# Patient Record
Sex: FEMALE | Race: BLACK OR AFRICAN AMERICAN | NOT HISPANIC OR LATINO | Employment: FULL TIME | URBAN - METROPOLITAN AREA
[De-identification: names, ages, dates, MRNs, and addresses within clinical notes are randomized per-mention and may not be internally consistent; named-entity substitution may affect disease eponyms.]

---

## 2018-05-27 ENCOUNTER — HOSPITAL ENCOUNTER (EMERGENCY)
Facility: HOSPITAL | Age: 48
Discharge: HOME | End: 2018-05-27
Attending: EMERGENCY MEDICINE
Payer: COMMERCIAL

## 2018-05-27 VITALS
TEMPERATURE: 98 F | OXYGEN SATURATION: 97 % | SYSTOLIC BLOOD PRESSURE: 139 MMHG | HEART RATE: 80 BPM | HEIGHT: 64 IN | BODY MASS INDEX: 34.15 KG/M2 | RESPIRATION RATE: 18 BRPM | WEIGHT: 200 LBS | DIASTOLIC BLOOD PRESSURE: 85 MMHG

## 2018-05-27 DIAGNOSIS — N39.0 URINARY TRACT INFECTION WITHOUT HEMATURIA, SITE UNSPECIFIED: Primary | ICD-10-CM

## 2018-05-27 LAB
B-HCG UR QL: NEGATIVE
BACTERIA #/AREA URNS HPF: ABNORMAL /HPF
BILIRUB UR QL STRIP: NEGATIVE
CLARITY UR: CLEAR
COLOR UR: YELLOW
CTP QC/QA: YES
GLUCOSE UR QL STRIP: NEGATIVE
HGB UR QL STRIP: NEGATIVE
KETONES UR QL STRIP: NEGATIVE
LEUKOCYTE ESTERASE UR QL STRIP: ABNORMAL
MICROSCOPIC COMMENT: ABNORMAL
NITRITE UR QL STRIP: NEGATIVE
NON-SQ EPI CELLS #/AREA URNS HPF: 3 /HPF
PH UR STRIP: 7 [PH] (ref 5–8)
PROT UR QL STRIP: NEGATIVE
RBC #/AREA URNS HPF: 1 /HPF (ref 0–4)
SP GR UR STRIP: 1.02 (ref 1–1.03)
SQUAMOUS #/AREA URNS HPF: 2 /HPF
URN SPEC COLLECT METH UR: ABNORMAL
UROBILINOGEN UR STRIP-ACNC: NEGATIVE EU/DL
WBC #/AREA URNS HPF: >100 /HPF (ref 0–5)
YEAST URNS QL MICRO: ABNORMAL

## 2018-05-27 PROCEDURE — 81000 URINALYSIS NONAUTO W/SCOPE: CPT

## 2018-05-27 PROCEDURE — 25000003 PHARM REV CODE 250: Performed by: PHYSICIAN ASSISTANT

## 2018-05-27 PROCEDURE — 87077 CULTURE AEROBIC IDENTIFY: CPT

## 2018-05-27 PROCEDURE — 87088 URINE BACTERIA CULTURE: CPT

## 2018-05-27 PROCEDURE — 87086 URINE CULTURE/COLONY COUNT: CPT

## 2018-05-27 PROCEDURE — 99284 EMERGENCY DEPT VISIT MOD MDM: CPT | Mod: 25

## 2018-05-27 PROCEDURE — 87186 SC STD MICRODIL/AGAR DIL: CPT

## 2018-05-27 PROCEDURE — 81025 URINE PREGNANCY TEST: CPT | Performed by: NURSE PRACTITIONER

## 2018-05-27 RX ORDER — PHENAZOPYRIDINE HYDROCHLORIDE 200 MG/1
200 TABLET, FILM COATED ORAL
Qty: 6 TABLET | Refills: 0 | Status: SHIPPED | OUTPATIENT
Start: 2018-05-27 | End: 2018-05-29

## 2018-05-27 RX ORDER — ETODOLAC 200 MG/1
200 CAPSULE ORAL 3 TIMES DAILY PRN
Qty: 20 CAPSULE | Refills: 0 | Status: SHIPPED | OUTPATIENT
Start: 2018-05-27 | End: 2018-06-03

## 2018-05-27 RX ORDER — CEPHALEXIN 500 MG/1
500 CAPSULE ORAL EVERY 12 HOURS
Qty: 20 CAPSULE | Refills: 0 | Status: SHIPPED | OUTPATIENT
Start: 2018-05-27 | End: 2018-06-06

## 2018-05-27 RX ORDER — CEPHALEXIN 250 MG/1
500 CAPSULE ORAL
Status: COMPLETED | OUTPATIENT
Start: 2018-05-27 | End: 2018-05-27

## 2018-05-27 RX ADMIN — CEPHALEXIN 500 MG: 250 CAPSULE ORAL at 11:05

## 2018-05-28 NOTE — ED TRIAGE NOTES
48 y.o female presents to the ED via personal transport. She reports left flank pain w/ urinary frequency. She also reports discomfort in her left ear.

## 2018-05-28 NOTE — DISCHARGE INSTRUCTIONS
Take full course of Keflex as prescribed for urinary tract infection. Take Azo to help with discomfort with urination.    Take Lodine as needed for pain.    Follow up with primary care in 2 days.     Return to ER for worsening symptoms, inability to tolerate by mouth or as needed.

## 2018-05-28 NOTE — ED PROVIDER NOTES
Encounter Date: 5/27/2018  This is a 48-year-old female presents with flank pain is states I think I may have a urinary tract infection patient.  She also complains of ear pain. Patient appears well in triage.  SCRIBE #1 NOTE: I, Tracy Scott, am scribing for, and in the presence of,  Alba Rojas PA-C. I have scribed the following portions of the note - Other sections scribed: ROS and HPI.       History     Chief Complaint   Patient presents with    Otalgia     Pt c/o left side ear pain x 2 days.  Denies taking pain medication.     Flank Pain     Pt c/o left side flank pain x 4 days.  Hurts to pee and frequency.      CC: Urine Urgency; Flank Pain; Otalgia    HPI: This 48 y.o. Female presents to the ED complaining of L sided flank pain, urine urgency, frequency and dysuria for last 4 days. She states she recently had a new boyfriend after a year long celibacy. Denies vaginal discharge, vaginal bleeding or any other sx.  She was treated for UTI a month ago.  She is on birth control pills.  She also reports left ear pain. No prior medical intervention for her sx.      The history is provided by the patient. No  was used.     Review of patient's allergies indicates:  No Known Allergies  Past Medical History:   Diagnosis Date    Hypertension      History reviewed. No pertinent surgical history.  History reviewed. No pertinent family history.  Social History   Substance Use Topics    Smoking status: Never Smoker    Smokeless tobacco: Not on file    Alcohol use No     Review of Systems   Constitutional: Negative for chills and fever.   HENT: Positive for ear pain (L).    Eyes: Negative for redness.   Gastrointestinal: Negative for nausea and vomiting.   Genitourinary: Positive for dysuria, flank pain, frequency and urgency. Negative for hematuria and vaginal discharge.       Physical Exam     Initial Vitals [05/27/18 2130]   BP Pulse Resp Temp SpO2   139/85 90 16 98.7 °F (37.1 °C) 98 %       MAP       103         Physical Exam    Nursing note and vitals reviewed.  Constitutional: She appears well-developed and well-nourished.   HENT:   Head: Normocephalic.   Right Ear: Hearing, tympanic membrane, external ear and ear canal normal.   Left Ear: Hearing, tympanic membrane, external ear and ear canal normal.   Nose: Nose normal.   Mouth/Throat: Oropharynx is clear and moist.   Eyes: Conjunctivae are normal.   Cardiovascular: Normal rate and regular rhythm. Exam reveals no gallop and no friction rub.    No murmur heard.  Pulmonary/Chest: Breath sounds normal. She has no wheezes. She has no rhonchi. She has no rales.   Abdominal: Soft. Bowel sounds are normal. She exhibits no distension. There is no tenderness. There is no rebound, no guarding, no tenderness at McBurney's point and negative Pace's sign.   Musculoskeletal: Normal range of motion.   Lymphadenopathy:     She has no cervical adenopathy.   Neurological: She is alert. She has normal strength. No cranial nerve deficit or sensory deficit.   Skin: Skin is warm and dry.   Psychiatric: She has a normal mood and affect.         ED Course   Procedures  Labs Reviewed   URINALYSIS - Abnormal; Notable for the following:        Result Value    Leukocytes, UA 3+ (*)     All other components within normal limits   URINALYSIS MICROSCOPIC - Abnormal; Notable for the following:     WBC, UA >100 (*)     Bacteria, UA Moderate (*)     Yeast, UA Rare (*)     Non-Squam Epith 3 (*)     All other components within normal limits   CULTURE, URINE   POCT URINE PREGNANCY             Medical Decision Making:   Initial Assessment:   Pt is a 47 y/o female with history of  no pertinent past medical history who presents for evaluation of 4 day history of L flank pain intermittent. Pt reports she has had associated dysuria, urinary urgency and frequency.  Patient is afebrile, pain or distress. Abdomen soft and nontender.  UPT is negative.  UA remarkable for infection.   Urine culture sent.  Keflex 1st dose given in the ED and at discharge as well as azo.   Patient also complaining of left otalgia x2 days with fullness sensation.  No evidence of AOM, OE or mastoiditis.  PCP follow-up in 2 days.  ER return precautions discussed including worsening symptoms, inability to tolerate p.o. or as needed. Discussed pt with Dr. quezada who agrees with assessment and paln.             Scribe Attestation:   Scribe #1: I performed the above scribed service and the documentation accurately describes the services I performed. I attest to the accuracy of the note.    Attending Attestation:   Physician Attestation Statement for Resident:  As the supervising MD . -: PATIENT HAS UTI.  NO DISCHARGE OR BLEEDING.  NOT PREGNANT.  REFUSING PELVIC EXAM.  WILL FOLLOW UP OUTPATIENT.  NO TENDERNESS ON EXAM.  NO CVA TENDERNESS. NO PELVIC TENDERNESS. WILL DC W FU         Physician Attestation for Scribe:  Physician Attestation Statement for Scribe #1: I, Alba Rojas PA-C, reviewed documentation, as scribed by Tracy Scott in my presence, and it is both accurate and complete.                    Clinical Impression:   The encounter diagnosis was Urinary tract infection without hematuria, site unspecified.                           Alba Rojas PA-C  05/28/18 0009

## 2018-05-30 LAB — BACTERIA UR CULT: NORMAL

## 2018-08-01 ENCOUNTER — TELEPHONE (OUTPATIENT)
Dept: BARIATRICS | Facility: CLINIC | Age: 48
End: 2018-08-01

## 2018-08-01 NOTE — TELEPHONE ENCOUNTER
----- Message from Sherri Bhagat sent at 8/1/2018 10:14 AM CDT -----  Contact: Self  Pt is calling because she is interested in being seen by Dr. Sneed for a weight program, to include diet pills, in lieu of a bariatric regiment.    She can be reached at [phone].    Thank you.

## 2018-08-02 ENCOUNTER — INITIAL CONSULT (OUTPATIENT)
Dept: BARIATRICS | Facility: CLINIC | Age: 48
End: 2018-08-02
Payer: COMMERCIAL

## 2018-08-02 VITALS
BODY MASS INDEX: 37.98 KG/M2 | HEART RATE: 84 BPM | HEIGHT: 64 IN | SYSTOLIC BLOOD PRESSURE: 128 MMHG | WEIGHT: 222.44 LBS | DIASTOLIC BLOOD PRESSURE: 80 MMHG

## 2018-08-02 DIAGNOSIS — I10 ESSENTIAL HYPERTENSION: Primary | ICD-10-CM

## 2018-08-02 PROBLEM — E66.813 OBESITY, CLASS III, BMI 40-49.9 (MORBID OBESITY): Status: ACTIVE | Noted: 2018-08-02

## 2018-08-02 PROBLEM — E66.01 OBESITY, CLASS III, BMI 40-49.9 (MORBID OBESITY): Status: ACTIVE | Noted: 2018-08-02

## 2018-08-02 PROCEDURE — 3008F BODY MASS INDEX DOCD: CPT | Mod: CPTII,S$GLB,, | Performed by: INTERNAL MEDICINE

## 2018-08-02 PROCEDURE — 99204 OFFICE O/P NEW MOD 45 MIN: CPT | Mod: S$GLB,,, | Performed by: INTERNAL MEDICINE

## 2018-08-02 PROCEDURE — 99999 PR PBB SHADOW E&M-EST. PATIENT-LVL III: CPT | Mod: PBBFAC,,, | Performed by: INTERNAL MEDICINE

## 2018-08-02 RX ORDER — OLMESARTAN MEDOXOMIL AND HYDROCHLOROTHIAZIDE 20/12.5 20; 12.5 MG/1; MG/1
1 TABLET ORAL DAILY
COMMUNITY
End: 2021-01-14

## 2018-08-02 NOTE — PROGRESS NOTES
Subjective:       Patient ID: Lizabeth Gomez is a 48 y.o. female.    Chief Complaint: Consult    CC: Weight    Pt seen today with daughter present.     Current attempts at weight loss: New pt to me, referred by Buffy Montalvo  No address on file , with Patient Active Problem List:     Essential hypertension- does not always take meds     Obesity, Class II, BMI 35-39.9, with comorbidity    Has tried to do better for 1 day with walking and eating a salad         Previous diet attempts: Denies    History of medication for loss: Phentermine last filled 1/16/18. States she developed a tolerance.   checked today  Did take trulicity from a friend for 3 months and says she lost some weight.     Heaviest weight: 285#  Lightest weight: 165#    Goal weight: 165#      Last eye exam:   Recent. No glaucoma per pt.     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 Pt does cook.   Breakfast: sausage biscuit. Weekends: pancakes, grits,     Lunch: hamburgers and fries. Fast food. Weekends: spaghetti and meatballs, baked chicken with veg.     Dinner: pork chops with rice and gravy and veg.  spaghetti and meatballs, baked chicken with veg.  Fried chicken. Chinese food- rice, ribs, egg rolls. Weekends: same    Snacks: ice cream around menses. Chips. Cookies.     Beverages: soda- 10 per week. Water. ETOH: 2 drinks a month.     Willingness to change: 8/10    EKG:    BMR: 1630      Review of Systems   Constitutional: Negative for chills and fever.   Respiratory: Negative for shortness of breath.         Mild Snoring   Cardiovascular: Positive for leg swelling. Negative for chest pain.   Gastrointestinal: Negative for constipation and diarrhea.        Denies GERD   Genitourinary: Negative for difficulty urinating and dysuria.   Musculoskeletal: Positive for back pain. Negative for arthralgias.   Neurological: Negative for dizziness and light-headedness.   Psychiatric/Behavioral: Negative  "for dysphoric mood. The patient is nervous/anxious.        Objective:     /80   Pulse 84   Ht 5' 4" (1.626 m)   Wt 100.9 kg (222 lb 7.1 oz)   LMP 07/29/2018   BMI 38.18 kg/m²     Physical Exam   Constitutional: She is oriented to person, place, and time. She appears well-developed. No distress.   Obese     HENT:   Head: Normocephalic and atraumatic.   Eyes: EOM are normal. Pupils are equal, round, and reactive to light. No scleral icterus.   Neck: Normal range of motion. Neck supple. No thyromegaly present.   Cardiovascular: Normal rate and normal heart sounds.  Exam reveals no gallop and no friction rub.    No murmur heard.  Pulmonary/Chest: Effort normal and breath sounds normal. No respiratory distress. She has no wheezes.   Abdominal: Soft. Bowel sounds are normal. She exhibits no distension. There is no tenderness.   Musculoskeletal: Normal range of motion. She exhibits no edema.   Neurological: She is alert and oriented to person, place, and time. No cranial nerve deficit.   Skin: Skin is warm and dry. No erythema.   Psychiatric: She has a normal mood and affect. Her behavior is normal. Judgment normal.   Vitals reviewed.      Assessment:       1. Essential hypertension    2. Obesity, Class II, BMI 35-39.9, with comorbidity        Plan:         1. Essential hypertension  The current medical regimen is effective;  continue present plan and medications. Expect improvement with weight loss.     2. Obesity, Class II, BMI 35-39.9, with comorbidity  Start Trulicity once a week. [email or web address].[email or web address] for coupon.     Decrease portions as soon as you start Trulicity. Some nausea in the first 2 weeks is not unusual.     If you get pain across the upper abdomen and around to your back, please call the office.     Exercise 30 min 3 times a week. Gradually increase.     Patient counseled in strategies for long term weight loss and maintenance: Keeping a food diary, exercise for 1 hour a day and eating breakfast " everyday.     3 meals a day made up of the following:  Unlimited green vegetables, tomatoes, mushrooms, spaghetti squash, cauliflower, meat, poultry, seafood, eggs and hard cheeses.   Milk and plain yogurt  Dressings, seasonings, condiments, etc should have less than 2 g sugars.   Beans (1-1.5 cups) or nuts (1/4 cup) can have 1 x a day.   1-2 servings of citrus fruits, berries, pineapple or melon a day (1/2 cup)  Avoid fried foods    No grains, rice, pasta, potatoes, bread, corn, peas, oatmeal, grits, tortillas, crackers, chips    No soda, sweet tea, juices or lemonade. All drinks should be 5 calories or less.     Www.dietdoctor.com for recipes. Moderate carb intake

## 2018-08-02 NOTE — PATIENT INSTRUCTIONS
Start Trulicity once a week. [email or web address].trulicity.com for coupon.     Decrease portions as soon as you start Trulicity. Some nausea in the first 2 weeks is not unusual.     If you get pain across the upper abdomen and around to your back, please call the office.     Exercise 30 min 3 times a week. Gradually increase.     Patient counseled in strategies for long term weight loss and maintenance: Keeping a food diary, exercise for 1 hour a day and eating breakfast everyday.     3 meals a day made up of the following:  Unlimited green vegetables, tomatoes, mushrooms, spaghetti squash, cauliflower, meat, poultry, seafood, eggs and hard cheeses.   Milk and plain yogurt  Dressings, seasonings, condiments, etc should have less than 2 g sugars.   Beans (1-1.5 cups) or nuts (1/4 cup) can have 1 x a day.   1-2 servings of citrus fruits, berries, pineapple or melon a day (1/2 cup)  Avoid fried foods    No grains, rice, pasta, potatoes, bread, corn, peas, oatmeal, grits, tortillas, crackers, chips    No soda, sweet tea, juices or lemonade. All drinks should be 5 calories or less.     [email or web address] for recipes. Moderate carb intake    *You can substitute regular dairy and/or dressings, and whole eggs for egg whites in the ideas below.     Meal Ideas for Regular Bariatric Diet  *Recipes and products available at www.bariatriceating.com      Breakfast: (15-20g protein)    - Egg white omelet: 2 egg whites or ½ cup Egg Beaters. (Optional proteins: cheese, shrimp, black beans, chicken, sliced turkey) (Optional veggies: tomatoes, salsa, spinach, mushrooms, onions, green peppers, or small slice avocado)     - Egg and sausage: 1 egg or ¼ cup Egg Beaters (any variety), with 1 travis or 2 links of Turkey sausage or Veggie breakfast sausage (Vehcon or Verid)    - Crust-less breakfast quiche: To make a glass pie dish, mix 4oz part skim Ricotta, 1 cup skim milk, and 2 eggs as your base. Add protein: shredded cheese, sliced lean ham or  turkey, turkey mc/sausage. Add veggies: tomato, onion, green onion, mushroom, green pepper, spinach, etc.    - Yogurt parfait: Mix 1 - 6oz container Dannon Light N Fit vanilla yogurt, with ¼ cup Kashi Go Lean cereal    - Cottage cheese and fruit: ½ cup part-skim cottage cheese or ricotta cheese topped with fresh fruit or sugar free preserves     - Paris De Guzman's Vanilla Egg custard* (add 2 Tbsp instant coffee granules to make Cappuccino Custard*)    - Hi-Protein café latte (skim milk, decaf coffee, 1 scoop protein powder). Optional to add Sugar free syrup or extract flavoring.    Lunch: (20-30g protein)    - ½ cup Black bean soup (Homemade or Progresso), with ¼ cup shredded low-fat cheese. Top with chopped tomato or fresh salsa.     - Lean deli turkey breast and low-fat sliced cheese, mustard or light bennett to moisten, rolled up together, or wrapped in a Rodri lettuce leaf    - Chicken salad made from dinner leftovers, moisten with low-fat salad dressing or light bennett. Also try leftover salmon, shrimp, tuna or boiled eggs. Serve ½ cup over dark green salad    - Fat-free canned refried beans, topped with ¼ cup shredded low-fat cheese. Top with chopped tomato or fresh salsa.     - Greek salad: Top mixed greens with 1-2oz grilled chicken, tomatoes, red onions, 2-3 kalamata olives, and sprinkle lightly with feta cheese. Spritz with Balsamic vinegar to taste.     - Crust-less lunch quiche: To make a glass pie dish, mix 4oz part skim Ricotta, 1 cup skim milk, and 2 eggs as your base. Add protein: shredded cheese, sliced lean ham or turkey, shrimp, chicken. Add veggies: tomato, onion, green onion, mushroom, green pepper, spinach, artichoke, broccoli, etc.    - Pizza bake: tomato sauce, low-fat shredded mozzarella and turkey pepperoni or Maltese mc. Add any veggies.    - Cucumber crab bites: Spread ¼ cup crab dip (lump crabmeat + light cream cheese and green onions) over sliced cucumber.     - Chicken with light  spinach and artichoke dip*: Puree in : 6oz cooked and drained spinach, 2 cloves garlic, 1 can cannelloni beans, ½ cup chopped green onions, 1 can drained artichoke hearts (not marinated in oil), lemon juice and basil. Mix in 2oz chopped up chicken.    Supper: (20-30g protein)    - Serve grilled fish over dark green salad tossed with low-fat dressing, served with grilled asparagus temple     - Rotisserie chicken salad: served with sliced strawberries, walnuts, fat-free feta cheese crumbles and 1 tbsp Smiths Own Light Raspberry Roby Vinaigrette    - Shrimp cocktail: Dip cold boiled shrimp in homemade low-sugar cocktail sauce (1/2 cup Maria Elena One Carb ketchup, 2 tbsp horseradish, 1/4 tsp hot sauce, 1 tsp Worcestershire sauce, 1 tbsp freshly-squeezed lemon juice). Serve with dark green salad, walnuts, and crumbled blue cheese drizzled with olive oil and Balsamic vinegar    - Tuna Melt: Spread tuna salad onto 2 thick slices of tomato. Top with low-fat cheese and broil until cheese is melted. May also be made with chicken salad of shrimp salad. [place] with different types of cheeses.    - Homemade low-fat Chili using extra lean ground beef or ground turkey. Top with shredded cheese and salsa as desired. May add dollop fat-free sour cream if desired    - Dinner Omelet with shrimp or chicken and onion, green peppers and chives.    - No noodle lasagna: Use sliced zucchini or eggplant in place of noodles.  Layer with part skim ricotta cheese and low sugar meat sauce (use very lean ground beef or ground turkey).    - Mexican chicken bake: Bake chunks of chicken breast or thigh with taco seasoning, Pace brand enchilada sauce, green onions and low-fat cheese. Serve with ¼ cup black beans or fat free refried beans topped with chopped tomatoes or salsa.    - [place] frozen meatballs, simmered in Classico Marinara sauce. Different flavors of salsa or spaghetti sauce create different dishes! Sprinkle with  parmesan cheese. Serve with grilled or steamed veggies, or a dark green salad.    - Simmer boneless skinless chicken thigh chunks in Classico Marinara sauce or roasted salsa until tender with chopped onion, bell pepper, garlic, mushrooms, spinach, etc.     - Hamburger, without the bun, dressed the way you like. Served with grilled or steamed veggies.    - Eggplant parmesan: Bake slices of eggplant at 350 degrees for 15 minutes. Layer tomato sauce, sliced eggplant and low-fat mozzarella cheese in a baking dish and cover with foil. Bake 30-40 more minutes or until bubbly. Uncover and bake at 400 degrees for about 15 more minutes, or until top is slightly crisp.    - Fish tacos: grilled/baked white fish, wrapped in Rodri lettuce leaf, topped with salsa, shredded low-fat cheese, and light coleslaw.    Snacks: (100-200 calories; >5g protein)    - 1 low-fat cheese stick with 8 cherry tomatoes or 1 serving fresh fruit  - 4 thin slices fat-free turkey breast and 1 slice low-fat cheese  - 4 thin slices fat-free honey ham with wedge of melon  - 1/4 cup unsalted nuts with ½ cup fruit  - 6-oz container Dannon Light n Fit vanilla yogurt, topped with 1oz unsalted nuts         - apple, celery or baby carrots spread with 2 Tbsp natural peanut butter or almond butter   - apple slices with 1 oz slice low-fat cheese  - celery, cucumber, bell pepper or baby carrots dipped in ¼ cup hummus bean spread or light spinach and artichoke dip (*recipe in lunch section)  - 100 calorie bag microwave light popcorn with 3 tbsp grated parmesan cheese  - Sulaiman Links Beef Steak - 14g protein! (similar to beef jerky)  - 2 wedges Laughing Cow - Light Herb & Garlic Cheese with sliced cucumber or green bell pepper  - 1/2 cup low-fat cottage cheese with ¼ cup fruit or ¼ cup salsa  - RTD Protein drinks: Atkins, Low Carb Slim Fast, EAS light, Muscle Milk Light, etc.  - Homemade Protein drinks: GNC Soy95, Isopure, Nectar, UNJURY, Whey Gourmet, etc. Mix 1  scoop powder with 8oz skim/1% milk or light soymilk.  - Protein bars: Atkins, EAS, Pure Protein, Think Thin, Detour, etc. Must have 0-4 grams sugar - Read the label.    Takeout Options: No more than twice/week  Deli - Salads (no pasta or rice), meats, cheeses. Roasted chicken. Lox (salmon)    Mexican - Platters which don't include tortillas, chips, or rice. Go easy on the beans. Example: Fajitas without the tortillas. Ask the  not to bring chips to the table if they are too tempting.    Greek - Meat or fish and vegetable, but no bread or rice. Including hummus, baba ganoush, etc, is OK. Most sit-down Greek restaurants can provide you with cucumber slices for dipping instead of tere bread.    Fast Food (Avoid as much as possible) - Salads (no croutons and limit salad dressing to 2 tbsp), grilled chicken sandwich without the bun and ask for no bennett. Odaliss low fat chili or Taco Bell pintos and cheese.    BBQ - The meats are fine if you ask for sauces on the side, but most of the traditional side dishes are loaded with carbs. Ronnie slaw, baked beans and BBQ sauce are typically made with sugar.    Chinese - Nothing deep-fried, no rice or noodles. Many Chinese sauces have starch and sugar in them, so you'll have to use your judgement. If you find that these sauces trigger cravings, or cause Dumping, you can ask for the sauce to be made without sugar or just use soy sauce.

## 2019-03-06 ENCOUNTER — OFFICE VISIT (OUTPATIENT)
Dept: BARIATRICS | Facility: CLINIC | Age: 49
End: 2019-03-06
Payer: COMMERCIAL

## 2019-03-06 VITALS
DIASTOLIC BLOOD PRESSURE: 82 MMHG | WEIGHT: 224 LBS | BODY MASS INDEX: 38.24 KG/M2 | SYSTOLIC BLOOD PRESSURE: 132 MMHG | HEIGHT: 64 IN | HEART RATE: 80 BPM

## 2019-03-06 DIAGNOSIS — E66.01 CLASS 2 SEVERE OBESITY WITH BODY MASS INDEX (BMI) OF 35 TO 39.9 WITH SERIOUS COMORBIDITY: Primary | ICD-10-CM

## 2019-03-06 DIAGNOSIS — I10 ESSENTIAL HYPERTENSION: ICD-10-CM

## 2019-03-06 PROCEDURE — 99999 PR PBB SHADOW E&M-EST. PATIENT-LVL III: CPT | Mod: PBBFAC,,, | Performed by: INTERNAL MEDICINE

## 2019-03-06 PROCEDURE — 3075F SYST BP GE 130 - 139MM HG: CPT | Mod: CPTII,S$GLB,, | Performed by: INTERNAL MEDICINE

## 2019-03-06 PROCEDURE — 99213 OFFICE O/P EST LOW 20 MIN: CPT | Mod: S$GLB,,, | Performed by: INTERNAL MEDICINE

## 2019-03-06 PROCEDURE — 3075F PR MOST RECENT SYSTOLIC BLOOD PRESS GE 130-139MM HG: ICD-10-PCS | Mod: CPTII,S$GLB,, | Performed by: INTERNAL MEDICINE

## 2019-03-06 PROCEDURE — 99999 PR PBB SHADOW E&M-EST. PATIENT-LVL III: ICD-10-PCS | Mod: PBBFAC,,, | Performed by: INTERNAL MEDICINE

## 2019-03-06 PROCEDURE — 99213 PR OFFICE/OUTPT VISIT, EST, LEVL III, 20-29 MIN: ICD-10-PCS | Mod: S$GLB,,, | Performed by: INTERNAL MEDICINE

## 2019-03-06 PROCEDURE — 3079F PR MOST RECENT DIASTOLIC BLOOD PRESSURE 80-89 MM HG: ICD-10-PCS | Mod: CPTII,S$GLB,, | Performed by: INTERNAL MEDICINE

## 2019-03-06 PROCEDURE — 3008F BODY MASS INDEX DOCD: CPT | Mod: CPTII,S$GLB,, | Performed by: INTERNAL MEDICINE

## 2019-03-06 PROCEDURE — 3008F PR BODY MASS INDEX (BMI) DOCUMENTED: ICD-10-PCS | Mod: CPTII,S$GLB,, | Performed by: INTERNAL MEDICINE

## 2019-03-06 PROCEDURE — 3079F DIAST BP 80-89 MM HG: CPT | Mod: CPTII,S$GLB,, | Performed by: INTERNAL MEDICINE

## 2019-03-06 RX ORDER — TRAMADOL HYDROCHLORIDE 50 MG/1
TABLET ORAL
Refills: 0 | COMMUNITY
Start: 2019-01-24 | End: 2019-03-06

## 2019-03-06 RX ORDER — METHOCARBAMOL 500 MG/1
TABLET, FILM COATED ORAL
Refills: 0 | COMMUNITY
Start: 2019-01-24 | End: 2019-03-06

## 2019-03-06 RX ORDER — MELOXICAM 15 MG/1
TABLET ORAL
Refills: 0 | COMMUNITY
Start: 2019-01-24 | End: 2019-03-06

## 2019-03-06 RX ORDER — TOPIRAMATE 25 MG/1
25 TABLET ORAL 2 TIMES DAILY
Qty: 60 TABLET | Refills: 3 | Status: SHIPPED | OUTPATIENT
Start: 2019-03-06 | End: 2019-06-21 | Stop reason: SDUPTHER

## 2019-03-06 NOTE — PATIENT INSTRUCTIONS
Patient was informed that topiramate is used for migraine prevention and seizures. Weight loss is a common side effect that is well documented. She understands this. She was informed of the potential side effects such as serious and possibly fatal rash in which case the medication should be discontinued immediately. Paresthesias, forgetfulness, fatigue, kidney stones, GI symptoms, and changes in lab values such as electrolytes, blood counts and kidney function.    Start topiramate  in the evening for 1 week, then morning and evening.        Exercise 30 min 5 times a week. Gradually increase.     Patient counseled in strategies for long term weight loss and maintenance: Keeping a food diary, exercise for 1 hour a day and eating breakfast everyday.     3 meals a day made up of the following:  Unlimited green vegetables, tomatoes, mushrooms, spaghetti squash, cauliflower, meat, poultry, seafood, eggs and hard cheeses.   Milk and plain yogurt  Dressings, seasonings, condiments, etc should have less than 2 g sugars.   Beans (1-1.5 cups) or nuts (1/4 cup) can have 1 x a day.   1-2 servings of citrus fruits, berries, pineapple or melon a day (1/2 cup)  Avoid fried foods    No grains, rice, pasta, potatoes, bread, corn, peas, oatmeal, grits, tortillas, crackers, chips    No soda, sweet tea, juices or lemonade. All drinks should be 5 calories or less.     [email or web address].[employer] for recipes. Moderate carb intake      *You can substitute regular dairy and/or dressings, and whole eggs for egg whites in the ideas below.           Meal Ideas for Regular Bariatric Diet  *Recipes and products available at www.bariatriceating.com      Breakfast: (15-20g protein)    - Egg white omelet: 2 egg whites or ½ cup Egg Beaters. (Optional proteins: cheese, shrimp, black beans, chicken, sliced turkey) (Optional veggies: tomatoes, salsa, spinach, mushrooms, onions, green peppers, or small slice avocado)     - Egg and sausage: 1 egg or ¼ cup Egg  Beaters (any variety), with 1 travis or 2 links of Turkey sausage or Veggie breakfast sausage ([employer] or [employer])    - Crust-less breakfast quiche: To make a glass pie dish, mix 4oz part skim Ricotta, 1 cup skim milk, and 2 eggs as your base. Add protein: shredded cheese, sliced lean ham or turkey, turkey mc/sausage. Add veggies: tomato, onion, green onion, mushroom, green pepper, spinach, etc.    - Yogurt parfait: Mix 1 - 6oz container Dannon Light N Fit vanilla yogurt, with ¼ cup Kashi Go Lean cereal    - Cottage cheese and fruit: ½ cup part-skim cottage cheese or ricotta cheese topped with fresh fruit or sugar free preserves     - Paris De Guzman's Vanilla Egg custard* (add 2 Tbsp instant coffee granules to make Cappuccino Custard*)    - Hi-Protein café latte (skim milk, decaf coffee, 1 scoop protein powder). Optional to add Sugar free syrup or extract flavoring.    Lunch: (20-30g protein)    - ½ cup Black bean soup (Homemade or Progresso), with ¼ cup shredded low-fat cheese. Top with chopped tomato or fresh salsa.     - Lean deli turkey breast and low-fat sliced cheese, mustard or light bennett to moisten, rolled up together, or wrapped in a Rodri lettuce leaf    - Chicken salad made from dinner leftovers, moisten with low-fat salad dressing or light bennett. Also try leftover salmon, shrimp, tuna or boiled eggs. Serve ½ cup over dark green salad    - Fat-free canned refried beans, topped with ¼ cup shredded low-fat cheese. Top with chopped tomato or fresh salsa.     - Greek salad: Top mixed greens with 1-2oz grilled chicken, tomatoes, red onions, 2-3 kalamata olives, and sprinkle lightly with feta cheese. Spritz with Balsamic vinegar to taste.     - Crust-less lunch quiche: To make a glass pie dish, mix 4oz part skim Ricotta, 1 cup skim milk, and 2 eggs as your base. Add protein: shredded cheese, sliced lean ham or turkey, shrimp, chicken. Add veggies: tomato, onion, green onion, mushroom, green pepper,  spinach, artichoke, broccoli, etc.    - Pizza bake: tomato sauce, low-fat shredded mozzarella and turkey pepperoni or Kay mc. Add any veggies.    - Cucumber crab bites: Spread ¼ cup crab dip (lump crabmeat + light cream cheese and green onions) over sliced cucumber.     - Chicken with light spinach and artichoke dip*: Puree in : 6oz cooked and drained spinach, 2 cloves garlic, 1 can cannelloni beans, ½ cup chopped green onions, 1 can drained artichoke hearts (not marinated in oil), lemon juice and basil. Mix in 2oz chopped up chicken.    Supper: (20-30g protein)    - Serve grilled fish over dark green salad tossed with low-fat dressing, served with grilled asparagus temple     - Rotisserie chicken salad: served with sliced strawberries, walnuts, fat-free feta cheese crumbles and 1 tbsp Smiths Own Light Raspberry [place] Vinaigrette    - Shrimp cocktail: Dip cold boiled shrimp in homemade low-sugar cocktail sauce (1/2 cup Maria Elena One Carb ketchup, 2 tbsp horseradish, 1/4 tsp hot sauce, 1 tsp Worcestershire sauce, 1 tbsp freshly-squeezed lemon juice). Serve with dark green salad, walnuts, and crumbled blue cheese drizzled with olive oil and Balsamic vinegar    - Tuna Melt: Spread tuna salad onto 2 thick slices of tomato. Top with low-fat cheese and broil until cheese is melted. May also be made with chicken salad of shrimp salad. [place] with different types of cheeses.    - Homemade low-fat Chili using extra lean ground beef or ground turkey. Top with shredded cheese and salsa as desired. May add dollop fat-free sour cream if desired    - Dinner Omelet with shrimp or chicken and onion, green peppers and chives.    - No noodle lasagna: Use sliced zucchini or eggplant in place of noodles.  Layer with part skim ricotta cheese and low sugar meat sauce (use very lean ground beef or ground turkey).    - Mexican chicken bake: Bake chunks of chicken breast or thigh with taco seasoning, Pace brand  enchilada sauce, green onions and low-fat cheese. Serve with ¼ cup black beans or fat free refried beans topped with chopped tomatoes or salsa.    - [place] frozen meatballs, simmered in Classico Marinara sauce. Different flavors of salsa or spaghetti sauce create different dishes! Sprinkle with parmesan cheese. Serve with grilled or steamed veggies, or a dark green salad.    - Simmer boneless skinless chicken thigh chunks in Classico Marinara sauce or roasted salsa until tender with chopped onion, bell pepper, garlic, mushrooms, spinach, etc.     - Hamburger, without the bun, dressed the way you like. Served with grilled or steamed veggies.    - Eggplant parmesan: Bake slices of eggplant at 350 degrees for 15 minutes. Layer tomato sauce, sliced eggplant and low-fat mozzarella cheese in a baking dish and cover with foil. Bake 30-40 more minutes or until bubbly. Uncover and bake at 400 degrees for about 15 more minutes, or until top is slightly crisp.    - Fish tacos: grilled/baked white fish, wrapped in Rodri lettuce leaf, topped with salsa, shredded low-fat cheese, and light coleslaw.    Snacks: (100-200 calories; >5g protein)    - 1 low-fat cheese stick with 8 cherry tomatoes or 1 serving fresh fruit  - 4 thin slices fat-free turkey breast and 1 slice low-fat cheese  - 4 thin slices fat-free honey ham with wedge of melon  - 1/4 cup unsalted nuts with ½ cup fruit  - 6-oz container Dannon Light n Fit vanilla yogurt, topped with 1oz unsalted nuts         - apple, celery or baby carrots spread with 2 Tbsp natural peanut butter or almond butter   - apple slices with 1 oz slice low-fat cheese  - celery, cucumber, bell pepper or baby carrots dipped in ¼ cup hummus bean spread or light spinach and artichoke dip (*recipe in lunch section)  - 100 calorie bag microwave light popcorn with 3 tbsp grated parmesan cheese  - Sulaiman Links Beef Steak - 14g protein! (similar to beef jerky)  - 2 wedges [place] - Light Herb &  Garlic Cheese with sliced cucumber or green bell pepper  - 1/2 cup low-fat cottage cheese with ¼ cup fruit or ¼ cup salsa  - RTD Protein drinks: Atkins, Low Carb Slim Fast, EAS light, Muscle Milk Light, etc.  - Homemade Protein drinks: GNC Soy95, Isopure, Nectar, UNJURY, Whey Gourmet, etc. Mix 1 scoop powder with 8oz skim/1% milk or light soymilk.  - Protein bars: Atkins, EAS, Pure Protein, Think Thin, Detour, etc. Must have 0-4 grams sugar - Read the label.    Takeout Options: No more than twice/week  Deli - Salads (no pasta or rice), meats, cheeses. Roasted chicken. Lox (salmon)    Mexican - Platters which don't include tortillas, chips, or rice. Go easy on the beans. Example: Fajitas without the tortillas. Ask the  not to bring chips to the table if they are too tempting.    Greek - Meat or fish and vegetable, but no bread or rice. Including hummus, baba ganoush, etc, is OK. Most sit-down Greek restaurants can provide you with cucumber slices for dipping instead of tere bread.    Fast Food (Avoid as much as possible) - Salads (no croutons and limit salad dressing to 2 tbsp), grilled chicken sandwich without the bun and ask for no bennett. Odaliss low fat chili or Taco Bell pintos and cheese.    BBQ - The meats are fine if you ask for sauces on the side, but most of the traditional side dishes are loaded with carbs. Ronnie slaw, baked beans and BBQ sauce are typically made with sugar.    Chinese - Nothing deep-fried, no rice or noodles. Many Chinese sauces have starch and sugar in them, so you'll have to use your judgement. If you find that these sauces trigger cravings, or cause Dumping, you can ask for the sauce to be made without sugar or just use soy sauce.      Sample meal plan  80-120g protein; 5214-7925 calories  Protein drinks and bars: 0-4 grams sugar  Drink lots of water throughout the day and exercise!  MENU # 1  Breakfast: 2 eggs, 1 turkey sausage travis  Lunch: 2-3 roll-ups (sliced turkey, low-fat  slice cheese), baby carrots dipped in 1 Tbsp natural peanut butter  Mid-Day Snack: ¼ cup unsalted almonds, ½ cup fruit  Supper: 1 chicken thigh simmered in tomato sauce + 2 Tbsp mozzarella cheese, ½ cup black beans, 1/2 cup steamed veggies  Evening Snack: 1/2 cup grapes with 4 cubes low-fat cheddar cheese   ___________________________________________________  MENU # 2  Breakfast: protein drink  Mid-morning snack : ¼ cup unsalted nuts  Lunch: 1 cup tuna or chicken salad made with light bennett, over salad.   Supper: hamburger travis, slice of cheese, 1 cup steamed veggies.   Snack: light yogurt      Menu #3  Breakfast: 6oz plain Greek yogurt + fruit (½ banana, ½ cup fruit - pineapple, blueberries, strawberries, peach), may add Splenda to sofya.  Lunch: ½  chicken breast w/ slice pepper faye cheese, 1/2 cup steamed veggies and small salad with Salad Spritzer.    Mid-Day snack: protein drink   Supper: 4oz Grilled fish, grilled veggie kabob ( mushrooms, onion, bell pepper, yellow squash, zucchini, cherry tomatoes)  Evening Snack: fudgsicle no-sugar-added    Menu # 4  Breakfast: vanilla iced coffee: 1 scoop vanilla protein powder + 4oz skim milk + 4oz coffee   Snack: protein bar  Lunch: 2 Lettuce tacos: ¼ cup seasoned ground turkey wrapped in a Rodri lettuce leaf with 1 Tbsp shredded cheese and dollop low-fat sour cream  Dinner: Shrimp omelet: 2 eggs, ½ cup shrimp, green onions, and shredded cheese        Menu #5  Breakfast: 1 cup low-fat cottage cheese, ½ cup peaches (no sugar added)  Lunch: 2 oz baked pork chop, 1 cup okra and tomato stew  Snack: 1 cup black beans + salsa + dollop sour cream  Dinner: Caprese chicken salad: 2 oz chicken, 1oz fresh mozzarella, sliced tomato, 1 Tbsp olive oil, basil  Snack: sugar-free pudding cup      Menu #6  Breakfast: ½ cup part-skim ricotta cheese, 2 Tbsp sugar-free strawberry preserves, ¼ cup slivered almonds  Lunch: 2 oz canned chicken, 1oz shredded cheddar cheese, ½ cup black  beans, 2 Tbsp salsa  Snack: Protein drink  Dinner: 4 oz grilled salmon steak, over mixed green salad with light dressing

## 2019-03-06 NOTE — PROGRESS NOTES
"Subjective:       Patient ID: Lizabeth Gomez is a 48 y.o. female.    Chief Complaint: Follow-up    Pt here today for follow-up. Has lost gained 1 lbs.  Has not been in since August. Started trulicity. Should be on LCHF diet. States still has cravings.      B: Grits, eggs and sausage.   L: Fas food. May get a salad at [employer] or chicken [employer]. Says eating fast food.   D: Pasta. Steak.   Sn: Denies.   Dr: Diet soda. Sweet tea. Water.       Review of Systems   Constitutional: Negative for chills and fever.   Respiratory: Negative for shortness of breath.         Mild Snoring   Cardiovascular: Positive for leg swelling. Negative for chest pain.   Gastrointestinal: Negative for constipation and diarrhea.        Denies GERD   Genitourinary: Negative for difficulty urinating and dysuria.   Musculoskeletal: Positive for back pain. Negative for arthralgias.   Neurological: Negative for dizziness and light-headedness.   Psychiatric/Behavioral: Negative for dysphoric mood. The patient is nervous/anxious.        Objective:     /82   Pulse 80   Ht 5' 4" (1.626 m)   Wt 101.6 kg (223 lb 15.8 oz)   BMI 38.45 kg/m²     Physical Exam   Constitutional: She is oriented to person, place, and time. She appears well-developed. No distress.   Obese     HENT:   Head: Normocephalic and atraumatic.   Eyes: EOM are normal. Pupils are equal, round, and reactive to light. No scleral icterus.   Neck: Normal range of motion. Neck supple. No thyromegaly present.   Cardiovascular: Normal rate and normal heart sounds. Exam reveals no gallop and no friction rub.   No murmur heard.  Pulmonary/Chest: Effort normal and breath sounds normal. No respiratory distress. She has no wheezes.   Abdominal: Soft. Bowel sounds are normal. She exhibits no distension. There is no tenderness.   Musculoskeletal: Normal range of motion. She exhibits no edema.   Neurological: She is alert and oriented to person, place, and time. No cranial nerve deficit. "   Skin: Skin is warm and dry. No erythema.   Psychiatric: She has a normal mood and affect. Her behavior is normal. Judgment normal.   Vitals reviewed.      Assessment:       1. Class 2 severe obesity with body mass index (BMI) of 35 to 39.9 with serious comorbidity    2. Essential hypertension        Plan:           Lizabeth was seen today for follow-up.    Diagnoses and all orders for this visit:    Class 2 severe obesity with body mass index (BMI) of 35 to 39.9 with serious comorbidity    Essential hypertension    Other orders  -     dulaglutide (TRULICITY) 1.5 mg/0.5 mL PnIj; Inject 1.5 mg into the skin every 7 days.  -     topiramate (TOPAMAX) 25 MG tablet; Take 1 tablet (25 mg total) by mouth 2 (two) times daily.          Exercise 30 min 5 times a week. Gradually increase.     Patient counseled in strategies for long term weight loss and maintenance: Keeping a food diary, exercise for 1 hour a day and eating breakfast everyday.     3 meals a day made up of the following:  Unlimited green vegetables, tomatoes, mushrooms, spaghetti squash, cauliflower, meat, poultry, seafood, eggs and hard cheeses.   Milk and plain yogurt  Dressings, seasonings, condiments, etc should have less than 2 g sugars.   Beans (1-1.5 cups) or nuts (1/4 cup) can have 1 x a day.   1-2 servings of citrus fruits, berries, pineapple or melon a day (1/2 cup)  Avoid fried foods    No grains, rice, pasta, potatoes, bread, corn, peas, oatmeal, grits, tortillas, crackers, chips    No soda, sweet tea, juices or lemonade. All drinks should be 5 calories or less.     [email or web address].[email or web address] for recipes. Moderate carb intake      Discussed importance of dietary compliance with pt.

## 2019-06-21 ENCOUNTER — OFFICE VISIT (OUTPATIENT)
Dept: BARIATRICS | Facility: CLINIC | Age: 49
End: 2019-06-21
Payer: COMMERCIAL

## 2019-06-21 VITALS
WEIGHT: 226.19 LBS | HEART RATE: 71 BPM | DIASTOLIC BLOOD PRESSURE: 80 MMHG | BODY MASS INDEX: 38.62 KG/M2 | HEIGHT: 64 IN | SYSTOLIC BLOOD PRESSURE: 138 MMHG

## 2019-06-21 DIAGNOSIS — E66.01 CLASS 2 SEVERE OBESITY WITH BODY MASS INDEX (BMI) OF 35 TO 39.9 WITH SERIOUS COMORBIDITY: Primary | ICD-10-CM

## 2019-06-21 DIAGNOSIS — I10 ESSENTIAL HYPERTENSION: ICD-10-CM

## 2019-06-21 PROCEDURE — 3079F PR MOST RECENT DIASTOLIC BLOOD PRESSURE 80-89 MM HG: ICD-10-PCS | Mod: CPTII,S$GLB,, | Performed by: INTERNAL MEDICINE

## 2019-06-21 PROCEDURE — 99213 PR OFFICE/OUTPT VISIT, EST, LEVL III, 20-29 MIN: ICD-10-PCS | Mod: S$GLB,,, | Performed by: INTERNAL MEDICINE

## 2019-06-21 PROCEDURE — 3008F BODY MASS INDEX DOCD: CPT | Mod: CPTII,S$GLB,, | Performed by: INTERNAL MEDICINE

## 2019-06-21 PROCEDURE — 3075F PR MOST RECENT SYSTOLIC BLOOD PRESS GE 130-139MM HG: ICD-10-PCS | Mod: CPTII,S$GLB,, | Performed by: INTERNAL MEDICINE

## 2019-06-21 PROCEDURE — 99213 OFFICE O/P EST LOW 20 MIN: CPT | Mod: S$GLB,,, | Performed by: INTERNAL MEDICINE

## 2019-06-21 PROCEDURE — 99999 PR PBB SHADOW E&M-EST. PATIENT-LVL III: ICD-10-PCS | Mod: PBBFAC,,, | Performed by: INTERNAL MEDICINE

## 2019-06-21 PROCEDURE — 3008F PR BODY MASS INDEX (BMI) DOCUMENTED: ICD-10-PCS | Mod: CPTII,S$GLB,, | Performed by: INTERNAL MEDICINE

## 2019-06-21 PROCEDURE — 3075F SYST BP GE 130 - 139MM HG: CPT | Mod: CPTII,S$GLB,, | Performed by: INTERNAL MEDICINE

## 2019-06-21 PROCEDURE — 3079F DIAST BP 80-89 MM HG: CPT | Mod: CPTII,S$GLB,, | Performed by: INTERNAL MEDICINE

## 2019-06-21 PROCEDURE — 99999 PR PBB SHADOW E&M-EST. PATIENT-LVL III: CPT | Mod: PBBFAC,,, | Performed by: INTERNAL MEDICINE

## 2019-06-21 RX ORDER — TOPIRAMATE 50 MG/1
50 TABLET, FILM COATED ORAL 2 TIMES DAILY
Qty: 180 TABLET | Refills: 1 | Status: SHIPPED | OUTPATIENT
Start: 2019-06-21 | End: 2019-10-04 | Stop reason: SDUPTHER

## 2019-06-21 NOTE — PROGRESS NOTES
"Subjective:       Patient ID: Lizabeth Gomez is a 49 y.o. female.    Chief Complaint: Follow-up    Pt here today for follow-up. Has lost gained 3 lbs. Net pos 4 lbs  . Started trulicity. Should be on LCHF diet. States still had cravings, so added topiramate last OV. She does find that has helped with the cravings. Denies SE. She is eating less bread and pasta. Has not been exercising much, but plans to start. Does feel she gets full pretty easily.     B: Grits, eggs and sausage.   L: Fas food. May get a salad at [employer] or [employer]. Says eating fast food.   D: Pasta. Steak.   Sn: Denies.   Dr: Diet soda. Sweet tea. Water.     Review of Systems   Constitutional: Negative for chills and fever.   Respiratory: Negative for shortness of breath.         Mild Snoring   Cardiovascular: Positive for leg swelling. Negative for chest pain.   Gastrointestinal: Negative for constipation and diarrhea.        Denies GERD   Genitourinary: Negative for difficulty urinating and dysuria.   Musculoskeletal: Positive for back pain. Negative for arthralgias.   Neurological: Negative for dizziness and light-headedness.   Psychiatric/Behavioral: Negative for dysphoric mood. The patient is nervous/anxious.        Objective:     /80   Pulse 71   Ht 5' 4" (1.626 m)   Wt 102.6 kg (226 lb 3.1 oz)   LMP 06/04/2019   BMI 38.83 kg/m²     Physical Exam   Constitutional: She is oriented to person, place, and time. She appears well-developed. No distress.   Obese     HENT:   Head: Normocephalic and atraumatic.   Eyes: Pupils are equal, round, and reactive to light. EOM are normal. No scleral icterus.   Neck: Normal range of motion. Neck supple.   Cardiovascular: Normal rate.   Pulmonary/Chest: Effort normal.   Musculoskeletal: Normal range of motion. She exhibits no edema.   Neurological: She is alert and oriented to person, place, and time. No cranial nerve deficit.   Skin: Skin is warm and dry. No erythema.   Psychiatric: She " has a normal mood and affect. Her behavior is normal. Judgment normal.   Vitals reviewed.      Assessment:       1. Class 2 severe obesity with body mass index (BMI) of 35 to 39.9 with serious comorbidity    2. Essential hypertension        Plan:           Lizabeth was seen today for follow-up.    Diagnoses and all orders for this visit:    Class 2 severe obesity with body mass index (BMI) of 35 to 39.9 with serious comorbidity    Essential hypertension    Other orders  -     topiramate (TOPAMAX) 50 MG tablet; Take 1 tablet (50 mg total) by mouth 2 (two) times daily.  -     dulaglutide (TRULICITY) 1.5 mg/0.5 mL PnIj; Inject 1.5 mg into the skin every 7 days.          Exercise 30 min 5 times a week. Gradually increase.     Patient counseled in strategies for long term weight loss and maintenance: Keeping a food diary, exercise for 1 hour a day and eating breakfast everyday.     3 meals a day made up of the following:  Unlimited green vegetables, tomatoes, mushrooms, spaghetti squash, cauliflower, meat, poultry, seafood, eggs and hard cheeses.   Milk and plain yogurt  Dressings, seasonings, condiments, etc should have less than 2 g sugars.   Beans (1-1.5 cups) or nuts (1/4 cup) can have 1 x a day.   1-2 servings of citrus fruits, berries, pineapple or melon a day (1/2 cup)  Avoid fried foods    No grains, rice, pasta, potatoes, bread, corn, peas, oatmeal, grits, tortillas, crackers, chips    No soda, sweet tea, juices or lemonade. All drinks should be 5 calories or less.     [email or web address].[email or web address] for recipes. Moderate carb intake

## 2019-06-21 NOTE — PATIENT INSTRUCTIONS
Continue with Topiramate and trulicity.     Exercise 30 min 5 times a week. Gradually increase.     Patient counseled in strategies for long term weight loss and maintenance: Keeping a food diary, exercise for 1 hour a day and eating breakfast everyday.     3 meals a day made up of the following:  Unlimited green vegetables, tomatoes, mushrooms, spaghetti squash, cauliflower, meat, poultry, seafood, eggs and hard cheeses.   Milk and plain yogurt  Dressings, seasonings, condiments, etc should have less than 2 g sugars.   Beans (1-1.5 cups) or nuts (1/4 cup) can have 1 x a day.   1-2 servings of citrus fruits, berries, pineapple or melon a day (1/2 cup)  Avoid fried foods    No grains, rice, pasta, potatoes, bread, corn, peas, oatmeal, grits, tortillas, crackers, chips    No soda, sweet tea, juices or lemonade. All drinks should be 5 calories or less.     [email or web address] for recipes. Moderate carb intake      Eating well to be healthy and lose weight does not have to be hard. It also does not have to be time consuming or expensive. There a lots of ways you can work in healthy choices into your day. Many of these are easy, quick and even family friendly!    Homemade hazelnut au lait  Brew your favorite brand of hazelnut flavored coffee ([employer] makes a good one). Microwave 1/2 cup of milk that fits your eating plan (whole, skim or sugar-free almond milk can all work). Add half to 1 oz sugar free hazelnut syrup.     Quick and easy breakfast  1-2 boiled eggs or mini-frittatas with a tangerine. The boiled eggs and mini-frittatas can both be made ahead and last for up to 4 days in the refrigerator. Bonus if you portion them out in ready to go containers or zipper bags.     Breakfast Egg Muffins with Mc and Spinach  Makes 12 muffins  Ingredients    6 eggs  ¼ cup milk  ¼ teaspoon salt  2 cups grated cheddar cheese  3/4 cup spinach, cooked and drained (about 8 oz fresh spinach)  6 mc slices, cooked, drained of fat,  and chopped  1/2 cup grated Parmesan cheese (optional)    Instructions      Preheat oven to 350 degrees. Use a regular 12-cup muffin pan. Spray the muffin pan with non-stick cooking spray.  In a large bowl, beat eggs until smooth. Add milk, salt, Cheddar cheese and mix. Stir spinach, cooked mc into the egg mixture. Ladle the egg mixture into greased muffin cups ¾ full.  Top each muffin cup with grated Parmesan cheese.  Bake for 25 minutes. Remove from the oven, let the muffins cool for 30 minutes before removing them from the pan.      Be a brown bagger! When you make dinner, plan for an extra helping. When you serve your plate for dinner, serve an additional helping into a container that you can take with you the next day. If you don't have a refrigerator available during the day, an insulated lunch bag and ice packs will help you safely store you lunch.     Cold Brewed Iced tea. Fill a pitcher with 64 oz filtered water. Add either 4 regular tea bags of your choice or a large iced tea bag. Refrigerate over night then remove the tea bags. The tea will not be bitter and is super flavorful. Get creative! Try combinations like green tea and hibiscus tea or black tea with lemon zinger. Add orange or lemon slices for even more flavor.     Snack wisely. Protein filled snacks will fill you up, allowing you to get by with fewer calories. String cheese, pork skins (chicharrones), turkey pepperoni, or celery with cream cheese will all fit the bill.       Ditch the take out. Turkey tacos (with or without a low carb tortilla), burgers (without the bun), or fun stir fries are all quick and easy. The whole family will be happy, and you can save calories and money.      Orange Chicken Stir echols with asparagus   Makes 6 servings  Ingredients:    1.5 lbs boneless skinless chicken breast/tenders, diced into 1-inch pieces  1 Tbsp extra virgin olive or avocado oil, divided  2 lb asparagus, end portions trimmed and remainder diced  into 1 1/2-inch pieces  1 small yellow onion, sliced into thin strips  8 oz button mushrooms, sliced  1 Tbsp peeled and finely grated fresh donna  4 cloves garlic, minced  1/2 cup low-sodium chicken broth  Juice of 2 fresh oranges  2 Tbsp low sodium soy sauce  2 Tbsp cornstarch  Sea salt and freshly ground black pepper    Directions:    In a 12-inch non-stick wok, heat 1/2 oil over moderately high heat. Once oil is hot, add diced chicken and season lightly with salt and pepper. Sauté until cooked through, tossing occasionally, about 5-6 minutes.  Place chicken on a large plate and set aside. Return wok, reduce to medium-high heat, add remaining oil.  Once oil is hot, add asparagus, yellow onion and mushrooms, and sauté until tender-crisp, about 4 - 5 minutes, adding in garlic and donna during the last 1 minute of sautéing.  Meanwhile, in a mixing bowl whisk together chicken broth, orange juice, soy sauce and cornstarch until well blended.  Pour chicken broth mixture into skillet with veggies, season with salt and pepper to taste, and bring mixture to a light boil, stirring constantly. Allow mixture to gently boil, stirring constantly, until thickened, about 1 minute.  Toss chicken into mixture and serve immediately over cauliflower rice or Shirataki noodles.      Skinny Chicken Tortilla Soup  Makes 7 servings    2 teaspoons olive oil  1 cup onion, chopped (about 1 small)  2 cups celery, sliced (about 4 medium stalks)  4 garlic cloves, minced  4 medium tomatoes, chopped  2 cups water  4 cups low-sodium organic chicken broth  3 cups chopped and/or shredded rotisserie chicken, skinless  2 cups sliced carrots (about 3 medium)  1 teaspoon dried oregano leaves  2 teaspoons chili powder  1 teaspoon garlic powder  2 teaspoons cumin  ½ teaspoon cayenne pepper (add less or omit, if you don't want a spicy soup)  ½ teaspoon sea salt + more to taste  ½ teaspoon pepper + more to taste    Directions:   Put all ingredients into a  large crock pot. Cook on low for 5-6 hours.     Optional garnish with chopped avocado, chopped fresh cilantro, crumbled Cotija cheese, sour cream, Greek yogurt, your favorite hot sauce.           Vegan Avocado Banana Chocolate Pudding  Makes 4 servings  Ingredients    1 1/2 ripe avocados  2 ripe bananas  6 tbsp raw cacao powder or unsweetened cocoa powder  2-3 tbsp maple syrup (or calorie free sweetener)  1/4 cup almond milk  Instructions    Blend everything together in a  until the consistency is smooth and velvety. Taste and see if more sweetener is needed and stir to make sure everything is evenly mixed. Blend a second time if needed.  Top with banana slices, raw cacao nibs, almond butter, or any other toppings and enjoy!

## 2019-10-04 ENCOUNTER — OFFICE VISIT (OUTPATIENT)
Dept: BARIATRICS | Facility: CLINIC | Age: 49
End: 2019-10-04
Payer: COMMERCIAL

## 2019-10-04 VITALS
HEART RATE: 64 BPM | HEIGHT: 64 IN | DIASTOLIC BLOOD PRESSURE: 72 MMHG | WEIGHT: 225.06 LBS | SYSTOLIC BLOOD PRESSURE: 124 MMHG | BODY MASS INDEX: 38.42 KG/M2

## 2019-10-04 DIAGNOSIS — E66.01 CLASS 2 SEVERE OBESITY WITH BODY MASS INDEX (BMI) OF 35 TO 39.9 WITH SERIOUS COMORBIDITY: ICD-10-CM

## 2019-10-04 DIAGNOSIS — I10 ESSENTIAL HYPERTENSION: Primary | ICD-10-CM

## 2019-10-04 PROCEDURE — 3078F DIAST BP <80 MM HG: CPT | Mod: CPTII,S$GLB,, | Performed by: INTERNAL MEDICINE

## 2019-10-04 PROCEDURE — 3008F PR BODY MASS INDEX (BMI) DOCUMENTED: ICD-10-PCS | Mod: CPTII,S$GLB,, | Performed by: INTERNAL MEDICINE

## 2019-10-04 PROCEDURE — 3074F SYST BP LT 130 MM HG: CPT | Mod: CPTII,S$GLB,, | Performed by: INTERNAL MEDICINE

## 2019-10-04 PROCEDURE — 3074F PR MOST RECENT SYSTOLIC BLOOD PRESSURE < 130 MM HG: ICD-10-PCS | Mod: CPTII,S$GLB,, | Performed by: INTERNAL MEDICINE

## 2019-10-04 PROCEDURE — 99213 OFFICE O/P EST LOW 20 MIN: CPT | Mod: S$GLB,,, | Performed by: INTERNAL MEDICINE

## 2019-10-04 PROCEDURE — 99213 PR OFFICE/OUTPT VISIT, EST, LEVL III, 20-29 MIN: ICD-10-PCS | Mod: S$GLB,,, | Performed by: INTERNAL MEDICINE

## 2019-10-04 PROCEDURE — 99999 PR PBB SHADOW E&M-EST. PATIENT-LVL III: CPT | Mod: PBBFAC,,, | Performed by: INTERNAL MEDICINE

## 2019-10-04 PROCEDURE — 3078F PR MOST RECENT DIASTOLIC BLOOD PRESSURE < 80 MM HG: ICD-10-PCS | Mod: CPTII,S$GLB,, | Performed by: INTERNAL MEDICINE

## 2019-10-04 PROCEDURE — 3008F BODY MASS INDEX DOCD: CPT | Mod: CPTII,S$GLB,, | Performed by: INTERNAL MEDICINE

## 2019-10-04 PROCEDURE — 99999 PR PBB SHADOW E&M-EST. PATIENT-LVL III: ICD-10-PCS | Mod: PBBFAC,,, | Performed by: INTERNAL MEDICINE

## 2019-10-04 RX ORDER — METHOCARBAMOL 500 MG/1
TABLET, FILM COATED ORAL
COMMUNITY
Start: 2019-10-01 | End: 2021-01-14

## 2019-10-04 RX ORDER — TRAMADOL HYDROCHLORIDE 50 MG/1
TABLET ORAL
Refills: 0 | COMMUNITY
Start: 2019-08-31 | End: 2021-01-14

## 2019-10-04 RX ORDER — TOPIRAMATE 50 MG/1
50 TABLET, FILM COATED ORAL 2 TIMES DAILY
Qty: 180 TABLET | Refills: 1 | Status: SHIPPED | OUTPATIENT
Start: 2019-10-04 | End: 2020-05-13 | Stop reason: SDUPTHER

## 2019-10-04 RX ORDER — MELOXICAM 15 MG/1
TABLET ORAL
COMMUNITY
Start: 2019-10-01 | End: 2021-01-14

## 2019-10-04 NOTE — PROGRESS NOTES
"Subjective:       Patient ID: Lizabeth Gomez is a 49 y.o. female.    Chief Complaint: Follow-up    Pt here today for follow-up. Has lost 1 lbs. Net pos 3 lbs  . Started trulicity. Should be on LCHF diet. States still had cravings, so added topiramate. She does find that has helped with the cravings. Denies SE. She is eating less bread and pasta. Has not been exercising much, but plans to start. Does feel she gets full pretty easily. Advised to sstop sugary drinks and decrease fast foods. She has been doing that. States her BP has been good. Dancing for exercise.       Follow-up   Pertinent negatives include no arthralgias, chest pain, chills or fever.     Review of Systems   Constitutional: Negative for chills and fever.   Respiratory: Negative for shortness of breath.         Mild Snoring   Cardiovascular: Positive for leg swelling. Negative for chest pain.   Gastrointestinal: Negative for constipation and diarrhea.        Denies GERD   Genitourinary: Negative for difficulty urinating and dysuria.   Musculoskeletal: Positive for back pain. Negative for arthralgias.   Neurological: Negative for dizziness and light-headedness.   Psychiatric/Behavioral: Negative for dysphoric mood. The patient is nervous/anxious.        Objective:     /72   Pulse 64   Ht 5' 4" (1.626 m)   Wt 102.1 kg (225 lb 1.4 oz)   BMI 38.64 kg/m²     Physical Exam   Constitutional: She is oriented to person, place, and time. She appears well-developed. No distress.   Obese     HENT:   Head: Normocephalic and atraumatic.   Eyes: Pupils are equal, round, and reactive to light. EOM are normal. No scleral icterus.   Neck: Normal range of motion. Neck supple.   Cardiovascular: Normal rate.   Pulmonary/Chest: Effort normal.   Musculoskeletal: Normal range of motion. She exhibits no edema.   Neurological: She is alert and oriented to person, place, and time. No cranial nerve deficit.   Skin: Skin is warm and dry. No erythema. "   Psychiatric: She has a normal mood and affect. Her behavior is normal. Judgment normal.   Vitals reviewed.      Assessment:       1. Essential hypertension    2. Class 2 severe obesity with body mass index (BMI) of 35 to 39.9 with serious comorbidity        Plan:           Lizabeth was seen today for follow-up.    Diagnoses and all orders for this visit:    Essential hypertension    Class 2 severe obesity with body mass index (BMI) of 35 to 39.9 with serious comorbidity    Other orders  -     semaglutide (OZEMPIC) 1 mg/dose (2 mg/1.5 mL) PnIj; Inject 1 mg subq weekly.  -     topiramate (TOPAMAX) 50 MG tablet; Take 1 tablet (50 mg total) by mouth 2 (two) times daily.      Start Ozempic once a week. Start with 1mg once a week.     [email or web address] for coupon.       Decrease portions as soon as you start Ozempic. Some nausea in the first 2 weeks is not unusual.     If you get pain across the upper abdomen and around to your back, please call the office.           Exercise 30 min 5 times a week. Gradually increase.     Patient counseled in strategies for long term weight loss and maintenance: Keeping a food diary, exercise for 1 hour a day and eating breakfast everyday.     3 meals a day made up of the following:  Unlimited green vegetables, tomatoes, mushrooms, spaghetti squash, cauliflower, meat, poultry, seafood, eggs and hard cheeses.   Milk and plain yogurt  Dressings, seasonings, condiments, etc should have less than 2 g sugars.   Beans (1-1.5 cups) or nuts (1/4 cup) can have 1 x a day.   1-2 servings of citrus fruits, berries, pineapple or melon a day (1/2 cup)  Avoid fried foods    No grains, rice, pasta, potatoes, bread, corn, peas, oatmeal, grits, tortillas, crackers, chips    No soda, sweet tea, juices or lemonade. All drinks should be 5 calories or less.     [email or web address] for recipes. Moderate carb intake

## 2019-10-04 NOTE — PATIENT INSTRUCTIONS
Start Ozempic once a week. Start with 1mg once a week.     [email or web address] for coupon.       Decrease portions as soon as you start Ozempic. Some nausea in the first 2 weeks is not unusual.     If you get pain across the upper abdomen and around to your back, please call the office.        Exercise 30 min 5 times a week. Gradually increase.     Patient counseled in strategies for long term weight loss and maintenance: Keeping a food diary, exercise for 1 hour a day and eating breakfast everyday.     3 meals a day made up of the following:  Unlimited green vegetables, tomatoes, mushrooms, spaghetti squash, cauliflower, meat, poultry, seafood, eggs and hard cheeses.   Milk and plain yogurt  Dressings, seasonings, condiments, etc should have less than 2 g sugars.   Beans (1-1.5 cups) or nuts (1/4 cup) can have 1 x a day.   1-2 servings of citrus fruits, berries, pineapple or melon a day (1/2 cup)  Avoid fried foods    No grains, rice, pasta, potatoes, bread, corn, peas, oatmeal, grits, tortillas, crackers, chips    No soda, sweet tea, juices or lemonade. All drinks should be 5 calories or less.     [email or web address] for recipes. Moderate carb intake      Helpful tips to survive the holidays:  - Dont save yourself for the meal: Make sure you continue to eat high protein small meals every 3-4 hours to ensure to do not become over-hungry. Avoid temptation by not showing up to a holiday party or gathering hungry.   - Plan ahead. Bring a dish to a party if you know there may not be an appropriate option.   - Choose sugar-free drinks: Stick to water or other sugar-free beverages and make sure you are getting 6-8 cups of fluid each day (but not with meals!). Avoid alcohol, carbonated beverages, and high-fat/high-sugar beverages like hot chocolate and eggnog. Try sugar-free hot cocoa made with skim milk or water, or sugar-free spiced tea to add some holiday flair to your beverage (see sugar-free mulled cider recipe  below)  - Take your time: Eat mindfully. Dont graze on food throughout the day. Sit down to enjoy your small meals. Chew slowly and thoroughly. Cut your food into small pieces before eating.  - Listen to your body: Stop eating as soon as you feel full. Do not feel pressured to try certain (or all) foods or to eat all of the food on your plate. Listen to your hunger cues.   - REMEMBER: Make your holidays about spending time with family and friends instead of focusing gatherings around food.  - Keep up your exercise routine: Make sure you continue to get regular exercise throughout the holiday season. Encourage friends and family to be active by taking a walk together after a meal, to look at holiday lights, or to window-shop.    Good Holiday Meal Options:  - Roasted Turkey, NO skin. Use low sodium broth instead of gravy.   - Stuffed Bell Peppers made WITHOUT bread crumbs or Rice. Try using parmesan cheese instead  - Gumbo, NO rice. Try picking out mostly the meat/seafood and vegetables with little broth.   - Green Bean Casserole made with 98% fat free cream of mushroom soup and crushed almonds/pecans instead of fried onions  - Side salad w/ low fat dressing. Try a different kind of salad maybe use Kale or spinach.   - Roasted non-starchy vegetables like brussel sprouts, broccoli, green beans, zucchini, butternut squash, cauliflower  - Cauliflower Mash (steam or roast cauliflower, puree w/ low fat cheese, dash of fat free milk and 2-3 sprays of I cant believe its not butter spray. Add garlic powder and black pepper to season). Use Low sodium broth instead of gravy.   - Try Loaded Cauliflower Mash (Make cauliflower like above cauliflower mash. Top with diced turkey mc, ¼ cup low fat cheddar cheese and bake @ 350* F for 5-10 minutes, until cheese is melted. Top with minced chives, black pepper and garlic to taste).   - Homemade cranberry sauce using Splenda or another alternative sweetener. Boil fresh cranberries  and add splenda to taste. Boil until cranberries break open and then simmer until it reaches the consistency you want (less time for more watery sauce and simmer for longer to create a thicker sauce).   - Deviled eggs: make using low fat bennett, mustard, DILL relish (not sweet relish).   - Vegetable tray w/ Greek yogurt Ranch Dip. Mix 1 packet of [place] ranch dip mix w/ 16 oz low fat plain greek yogurt.     Good Holiday Dessert Options:  - High protein Pumpkin Cheesecake (see recipe below)  - Pumpkin Whip (see recipe below)  - Quest Apple Pie or Cinnamon Roll flavored protein bar (warm in microwave for 10-15 seconds)  - Eggnog Protein shake (see recipe below)  - Fresh fruit w/ low fat cheese  - Sugar-free Jello Parfaits. Layer Red and Green sugar-free jello in cups and top w/ 2 tbsp Sugar-free cool-whip    Pumpkin Cheesecake    8 ounces fat free cream cheese, softened   2 scoops of vanilla protein powder (<4 g sugar per serving)   ¼ tsp Fine salt   2 eggs, at room temperature   1/3 cup fat free sour cream  1/3 cup fat free half and half  1 15 -ounce can pure pumpkin puree   1 tablespoon pumpkin pie spice, plus more for dusting   Unsalted nuts, crushed  *Add splenda to taste    Directions     1. Preheat the oven to 300 degrees F. Line 18 muffin cups with paper liners. Sprinkle 1 tsp crushed unsalted nuts at the bottom of each of muffin cup liner.     2. In a large bowl, beat the cream cheese, vanilla protein powder and 1/4 teaspoon fine salt on medium-high speed until smooth and creamy, 2 to 3 minutes. Scrape down the sides, reduce speed to low and beat in the eggs, 1 at a time, until combined. Beat in 1/3 cup fat free sour cream and fat free half and half. Stir in the pumpkin puree and pumpkin pie spice until smooth. Divide evenly among cookie-lined paper cups, filling almost all the way to the top.     3. Bake until the filling is just set, 40 to 45 minutes. A sharp knife inserted into the center will come out  moist, but clean. Cool completely in tins on a wire rack. Refrigerate until cold, 4 hours, or overnight. Top with a dusting of pumpkin pie spice.    Recipe altered from the following recipe: http://[email or web address].com/recipes/food-network-brooklynn/mini-pumpkin-cheesecakes-recipe.print.[email or web address]?oc=linkback    Pumpkin Whip    Box of sugar-free vanilla pudding  Can of pumpkin puree  Pumpkin Pie spice (sprinkle to taste)  ½ cup of sugar-free Cool Whip    Directions:  Make sugar-free pudding according to package directions using fat free or 1% milk. Stir in pumpkin and cool whip. Add pumpkin pie spice to taste.     Egg Nog Protein shake    8 oz skim or 1% milk  1 scoop vanilla protein powder  1 tbsp sugar-free vanilla pudding mix  ½ tsp butter flavor extract  ½ tsp rum extract  ½ tsp cinnamon     Shake together or blend with ice and serve.     Sugar-Free Mulled Cider    3 oz diet cran-apple juice  6 oz water  1 packet sugar-free apple cider mix  ½ tsp apple pie spice  ½ tsp butter flavor extract  1 tbsp Sugar-free Syrup    Mix together. Warm if needed and serve w/ orange wedge and cinnamon stick.

## 2020-01-24 ENCOUNTER — TELEPHONE (OUTPATIENT)
Dept: BARIATRICS | Facility: CLINIC | Age: 50
End: 2020-01-24

## 2020-01-24 NOTE — TELEPHONE ENCOUNTER
----- Message from Erick Meadows sent at 1/24/2020  3:12 PM CST -----  Contact: Pt  Patient called requesting to have an earlier appt time, currently on wait list         [phone] (home)

## 2020-01-24 NOTE — TELEPHONE ENCOUNTER
Returned pt. Called. Informed she miss appointment due to back problems she was experiencing, went seen her neurologist. I informed. Pt. Her appointment has been added to our waiting list at the moment we do not have any earlier availability. But I will keep an eye out if any one cancel. Pt. Verbally expressed understanding.

## 2020-05-11 ENCOUNTER — TELEPHONE (OUTPATIENT)
Dept: BARIATRICS | Facility: CLINIC | Age: 50
End: 2020-05-11

## 2020-05-11 NOTE — TELEPHONE ENCOUNTER
Spoke with pt in regards to scheduling a virtual visit with . Assisted pt during the signup process for patient portal access due to inactive account. Successfully scheduled pt virtual visit and 3 month f/u.

## 2020-05-11 NOTE — TELEPHONE ENCOUNTER
Attempted to reach pt. In regards to converting appointment? No answer.LVM. Requested a called back.

## 2020-05-13 ENCOUNTER — OFFICE VISIT (OUTPATIENT)
Dept: BARIATRICS | Facility: CLINIC | Age: 50
End: 2020-05-13
Payer: COMMERCIAL

## 2020-05-13 DIAGNOSIS — E66.01 CLASS 2 SEVERE OBESITY WITH BODY MASS INDEX (BMI) OF 35 TO 39.9 WITH SERIOUS COMORBIDITY: Primary | ICD-10-CM

## 2020-05-13 PROCEDURE — 99212 OFFICE O/P EST SF 10 MIN: CPT | Mod: 95,,, | Performed by: INTERNAL MEDICINE

## 2020-05-13 PROCEDURE — 99212 PR OFFICE/OUTPT VISIT, EST, LEVL II, 10-19 MIN: ICD-10-PCS | Mod: 95,,, | Performed by: INTERNAL MEDICINE

## 2020-05-13 RX ORDER — TOPIRAMATE 50 MG/1
50 TABLET, FILM COATED ORAL 2 TIMES DAILY
Qty: 180 TABLET | Refills: 1 | Status: SHIPPED | OUTPATIENT
Start: 2020-05-13 | End: 2021-01-14

## 2020-05-13 NOTE — PATIENT INSTRUCTIONS
Continue Ozempic once a week.       Decrease portions as soon as you start Ozempic. Some nausea in the first 2 weeks is not unusual.     If you get pain across the upper abdomen and around to your back, please call the office.           I would take it that the problem may be that your insurance may not cover anything to do with obesity or weight treatment. You should be able to find if this is excluded on your plan's website. If that is the case, there is not much that can be done. Financial services would have to answer any further questions.       [facility]  Tips to Stay on Track During COVID-19      DO DON'T   Keep up with your food log  Maintaining your caloric intake and diet quality is critical for achieving your health and weight loss goals.  Download the Valeriy AbraResto and use [facility] Code 69357 to track food and fluid intake Feel Discouraged - You can do this!  There are a lot of changes happening in our world but don't let them discourage you. Focus on the future and remind yourself of all the work and effort you've put in so far.     Keep protein-rich foods stocked up  buy chicken and turkey in bulk and freeze them, keep dry or canned beans on hand, get the largest quantity of eggs available. Make sure you are getting your required protein intake (between  grams EACH DAY).   Drink fluid during meals or 30 minutes before/after eating  Your regular routine may have changed which may have caused some of your meal or snack times to change.  keep track of when you consume any liquid and time your meals accordingly. This will also help you make sure you are staying hydrated throughout the day   Continue with your protein drinks or bars  Order online or use grocery delivery service. Always make sure you order more WELL BEFORE you are running low, especially now when deliveries may take longer to arrive.  Remember to check to make sure your protein shakes or bars  have 4 gms of sugar or less.     Keep table sugar around  You may be more tempted to add it to your drinks or food if it is visible and easily accessible.   Try new recipes  Use this time to experiment in the kitchen and find some different healthy dishes you enjoy - you can use the nutrition booklet to help guide you.  If you cannot find your copy, please download it from our website @ [email or web address]  Click here to download [facility]'s Nutrition Binder.   Add tough or crunchy foods back into your diet too quickly  Raw veggies are great snack foods but adding them in too quickly after surgery will cause pain and discomfort   Eat your meals slowly and intentionally  You shouldn't have to rush out to be anywhere so really take your time and aim for each meal to last around 20-30 minutes.   Drink sugary/bubbly drinks or alcohol    It may be tempting especially if you are surrounded by others without these limitations, but these beverages will prevent weight loss and cause gastric  pain/discomfort     Listen to your body - try to recognize when you feel full.  Learning your body's signals can be difficult but it is a key step in your weight loss journey. While you are is this process of working on this step, be sure portion out the recommended quantities of foods and meals/snacks to prevent overeating.   Eat your meals using electronics  This is especially difficult at home where your use of computers, phones, and TVs are pretty much unregulated. Designate a meal spot or spots where there is limited distractions and you can focus on your food - maybe your kitchen table or on an outside porch or deck.   Continue taking vitamins and minerals  Take them at the same time each day and keep a log of when you take your supplements to make sure you don't miss any. These supplements are essential for preventing malnutrition and other health problems that will deter  your progress.   'Save' your appetite   You may feel like holding out from food as long as possible so you can eat a large meal later on, but your body needs energy throughout the day in order to properly fuel itself and keep you alert.  Try eating small meals throughout the day - use these meals as mini breaks from work or projects you are doing at home.   Stay active!  It is so important for both your physical and mental health that you get regular physical activity. Even if you can no longer physically go to the gym or to workout classes there are tons of online resources to keep you moving. Incorporate a specific exercise time into your daily home routine and keep a journal of your activity.   Order food delivery or take out   Most places are now offering delivery services, but it can still be difficult to find and choose healthy options. Choose to do a grocery store  or delivery instead- cooking food at home is less expensive then eating out!   Review the resources you've been provided and keep in touch with your healthcare team.  Let them know if you are struggling or experiencing any problems - they are here to help!  Call us to schedule a telehealth visit at [phone] Isolate yourself   It may be called 'social distancing' but that does not mean we can't still connect with one another. Phone calls or group video chats are great ways to keep in touch while staying at home. Any method of getting regular social time with friends and family will help to remind you that you're not in this alone.     Grocery List    Items to Keep Stocked in Your Kitchen  PROTEINS (Lean)    Eggs  Beans (canned and/or dried)  Skinless chicken/turkey   Tuna/[place] (canned and/or pouch)  Tofu or Tempeh  Babak Veggie Burgers  Fish or shrimp (fresh or frozen)  Ground Beef (90% lean)  Steaks  Chobani Greek Yogurt  Cabot Cottage Cheese  Hummus  Low-fat cheeses (Laughing Cow, Baby Bell, mozzarella string cheese)  [facility]  Non-fat Milk    Vegetables (non-starchy)  *veggies can be fresh or frozen    Broccoli   Cauliflower   Carrots   Onions   Cabbage   Radishes   Zucchini   Okra   Greens   Peppers   Spinach   Turnips   Mushrooms   Tomatoes   Celery   Lettuce   Asparagus  Eggplant   Green Onions   Kale    Fruits  *Fruits can be fresh or frozen    Apples  Oranges  Pears  Kiwi  Melon  Berries  Peaches  Unsweetened Applesauce    *Avoid fruits canned In syrup  *Stick to 1-2 servings of fruit/day   Vitamins    Flintstones Complete  Chewables    Super B-Complex tablets with 50 mg Thiamine    Nature's Way liquid Calcium Citrate + Vit D     Sublingual Vitamin B12   Other    Sugar-free Popsicles    Sugar-free Jello    Crystal Lite    Low Fat Condiments     Decaf Coffee/Tea           Foods to Avoid Having Around the House  Butter/Margarine Cookies Candy Chips  Pretzels Grits  Granola Popcorn Ibarra Corn  Bread Alcohol Soda  Pasta  Rice  Cake/Pie Sausage Potatoes Ice Cream                 Tricks to Prevent Emotional Eating During Quarantine      Keep 'trigger foods' out of the house   Keep yourself distracted with work, games, music, or whatever hobby you enjoy. This may be the time to try a new activity!   Try fighting stress with breathing techniques, yoga, meditation, or prayer   Mix up your meals with a variety of dishes   Keep up with your food diary   Call or video chat with a friend or family   Plan your meals for specific time and try for smaller meals throughout the day   Pre-portion all meals and snacks    Step outside for some fresh air or do a quick exercise activity to reset yourself    *Avoid negative thoughts about yourself - if you have a slip-up, you are not a failure. Forgive yourself and focus on learning from it so you can prevent it for the future              Ways to Stay Active While Staying Inside      [employer] Videos - free exercise and gym classes at any fitness level   Apps -tons of fitness apps are currently  offering free trial periods and offer workouts that don't require equipment   Chores around the house, such as cleaning or gardening   Walk up and down the stairs   Video-chat with your regular workout libby and do an online class together   Make a playlist of your favorite fun songs and dance around - no need to worry about knowing any serious dance moves, just jump around get your heart rate up!    While you are limited to working out at home, you may find it easier to do short, mini workouts multiple times a day instead of all at once. Try to still get at least 30 minutes of physical activity in each day!   Physical Health = Mental Health    The health of both your mind and body are equally important -be sure you are taking the time to care for both. It is likely that the current health concerns and quarantine mandates caused significant changes to your normal routine. Although this can feel overwhelming and seem difficult to manage, there are ways you can take to manage these feelings and keep your mental and physical health journey on track. Here are some tips for self-care during quarantine:     Meditate, take deep breaths - find any practice that will help you center yourself.   Move around your house throughout the day. Avoid staying in the same seat or room for too long and try to work in an area of your house that get lots of sunlight if possible.   Get fresh air for a bit every day - being outside is a great way to improve your mood! You don't necessarily have to go far from your house. You could even just hang out on your porch for a while or take a walk around the block.    Get good sleep and maintain a regular sleep schedule   Connect with others. While we aren't able to physically be with others right now it is still so important to socialize and interact with other people, even if it is being done remotely.    Keep yourself busy. Make a list of tasks that you want to complete around the  house, start a new book, do some art projects, try journaling.

## 2020-05-13 NOTE — PROGRESS NOTES
Subjective:       Patient ID: Lizabeth Gomez is a 50 y.o. female.    Chief Complaint: Follow-up    The patient location is: [place], [place]. Home   The chief complaint leading to consultation is: Patient presents with:  Follow-up     Visit type: audiovisual  Total time spent with patient: 13 min  Each patient to whom he or she provides medical services by telemedicine is:  (1) informed of the relationship between the physician and patient and the respective role of any other health care provider with respect to management of the patient; and (2) notified that he or she may decline to receive medical services by telemedicine and may withdraw from such care at any time.    Notes:     Pt here today for follow-up. Has lost 1 lbs. Net pos 5 lbs  . Started Ozempic 1 mg last OV. Should be on LCHF diet. States still had cravings, so added topiramate. She does find that has helped with the cravings. Denies SE. She is eating less bread and pasta. Has just started exercising recently. Is eating at home more. Does feel she gets full pretty easily. Advised to sstop sugary drinks and decrease fast foods. She has been doing that. States her BP has been good.       Prev 225# in oct, current home weight 217#    Follow-up   Pertinent negatives include no arthralgias, chest pain, chills or fever.     Review of Systems   Constitutional: Negative for chills and fever.   Respiratory: Negative for shortness of breath.         Mild Snoring   Cardiovascular: Positive for leg swelling. Negative for chest pain.   Gastrointestinal: Negative for constipation and diarrhea.        Denies GERD   Genitourinary: Negative for difficulty urinating and dysuria.   Musculoskeletal: Positive for back pain. Negative for arthralgias.   Neurological: Negative for dizziness and light-headedness.   Psychiatric/Behavioral: Negative for dysphoric mood. The patient is nervous/anxious.        Objective:     There were no vitals taken for this  visit.    Physical Exam   Constitutional: She is oriented to person, place, and time. She appears well-developed. No distress.   Obese     HENT:   Head: Normocephalic and atraumatic.   Pulmonary/Chest: Effort normal.   Neurological: She is alert and oriented to person, place, and time.   Psychiatric: She has a normal mood and affect. Her behavior is normal. Judgment normal.   Vitals reviewed.      Assessment:       1. Class 2 severe obesity with body mass index (BMI) of 35 to 39.9 with serious comorbidity        Plan:           Lizabeth was seen today for follow-up.    Diagnoses and all orders for this visit:    Class 2 severe obesity with body mass index (BMI) of 35 to 39.9 with serious comorbidity    Other orders  -     topiramate (TOPAMAX) 50 MG tablet; Take 1 tablet (50 mg total) by mouth 2 (two) times daily.  -     semaglutide (OZEMPIC) 1 mg/dose (2 mg/1.5 mL) PnIj; Inject 1 mg subq weekly.      Continue Ozempic once a week.   [email or web address] for coupon.       Decrease portions as soon as you start Ozempic. Some nausea in the first 2 weeks is not unusual.     If you get pain across the upper abdomen and around to your back, please call the office.           Exercise 30 min 5 times a week. Gradually increase.     Patient counseled in strategies for long term weight loss and maintenance: Keeping a food diary, exercise for 1 hour a day and eating breakfast everyday.     3 meals a day made up of the following:  Unlimited green vegetables, tomatoes, mushrooms, spaghetti squash, cauliflower, meat, poultry, seafood, eggs and hard cheeses.   Milk and plain yogurt  Dressings, seasonings, condiments, etc should have less than 2 g sugars.   Beans (1-1.5 cups) or nuts (1/4 cup) can have 1 x a day.   1-2 servings of citrus fruits, berries, pineapple or melon a day (1/2 cup)  Avoid fried foods    No grains, rice, pasta, potatoes, bread, corn, peas, oatmeal, grits, tortillas, crackers, chips    No soda, sweet tea,  juices or lemonade. All drinks should be 5 calories or less.     [email or web address].dietdoctor.[email or web address] for recipes. Moderate carb intake

## 2021-01-14 ENCOUNTER — OFFICE VISIT (OUTPATIENT)
Dept: INTERNAL MEDICINE | Facility: CLINIC | Age: 51
End: 2021-01-14
Payer: COMMERCIAL

## 2021-01-14 VITALS
HEIGHT: 64 IN | HEART RATE: 83 BPM | OXYGEN SATURATION: 100 % | SYSTOLIC BLOOD PRESSURE: 136 MMHG | DIASTOLIC BLOOD PRESSURE: 86 MMHG | BODY MASS INDEX: 40.83 KG/M2 | WEIGHT: 239.19 LBS

## 2021-01-14 DIAGNOSIS — Z30.011 ENCOUNTER FOR INITIAL PRESCRIPTION OF CONTRACEPTIVE PILLS: ICD-10-CM

## 2021-01-14 DIAGNOSIS — Z11.59 ENCOUNTER FOR HEPATITIS C SCREENING TEST FOR LOW RISK PATIENT: ICD-10-CM

## 2021-01-14 DIAGNOSIS — Z76.89 ENCOUNTER TO ESTABLISH CARE WITH NEW DOCTOR: ICD-10-CM

## 2021-01-14 DIAGNOSIS — Z11.4 ENCOUNTER FOR SCREENING FOR HIV: ICD-10-CM

## 2021-01-14 DIAGNOSIS — Z12.31 ENCOUNTER FOR SCREENING MAMMOGRAM FOR MALIGNANT NEOPLASM OF BREAST: ICD-10-CM

## 2021-01-14 DIAGNOSIS — Z00.00 ENCOUNTER FOR HEALTH MAINTENANCE EXAMINATION: Primary | ICD-10-CM

## 2021-01-14 DIAGNOSIS — L30.9 ECZEMA, UNSPECIFIED TYPE: ICD-10-CM

## 2021-01-14 DIAGNOSIS — Z01.89 ROUTINE LAB DRAW: ICD-10-CM

## 2021-01-14 DIAGNOSIS — E66.01 MORBID OBESITY WITH BMI OF 40.0-44.9, ADULT: ICD-10-CM

## 2021-01-14 DIAGNOSIS — Z13.220 SCREENING CHOLESTEROL LEVEL: ICD-10-CM

## 2021-01-14 DIAGNOSIS — I10 ESSENTIAL HYPERTENSION: ICD-10-CM

## 2021-01-14 DIAGNOSIS — Z01.419 PAP TEST, AS PART OF ROUTINE GYNECOLOGICAL EXAMINATION: ICD-10-CM

## 2021-01-14 PROCEDURE — 3075F PR MOST RECENT SYSTOLIC BLOOD PRESS GE 130-139MM HG: ICD-10-PCS | Mod: CPTII,S$GLB,, | Performed by: NURSE PRACTITIONER

## 2021-01-14 PROCEDURE — 3075F SYST BP GE 130 - 139MM HG: CPT | Mod: CPTII,S$GLB,, | Performed by: NURSE PRACTITIONER

## 2021-01-14 PROCEDURE — 1126F PR PAIN SEVERITY QUANTIFIED, NO PAIN PRESENT: ICD-10-PCS | Mod: S$GLB,,, | Performed by: NURSE PRACTITIONER

## 2021-01-14 PROCEDURE — 3008F PR BODY MASS INDEX (BMI) DOCUMENTED: ICD-10-PCS | Mod: CPTII,S$GLB,, | Performed by: NURSE PRACTITIONER

## 2021-01-14 PROCEDURE — 99999 PR PBB SHADOW E&M-EST. PATIENT-LVL IV: ICD-10-PCS | Mod: PBBFAC,,, | Performed by: NURSE PRACTITIONER

## 2021-01-14 PROCEDURE — 1126F AMNT PAIN NOTED NONE PRSNT: CPT | Mod: S$GLB,,, | Performed by: NURSE PRACTITIONER

## 2021-01-14 PROCEDURE — 99999 PR PBB SHADOW E&M-EST. PATIENT-LVL IV: CPT | Mod: PBBFAC,,, | Performed by: NURSE PRACTITIONER

## 2021-01-14 PROCEDURE — 3008F BODY MASS INDEX DOCD: CPT | Mod: CPTII,S$GLB,, | Performed by: NURSE PRACTITIONER

## 2021-01-14 PROCEDURE — 3079F DIAST BP 80-89 MM HG: CPT | Mod: CPTII,S$GLB,, | Performed by: NURSE PRACTITIONER

## 2021-01-14 PROCEDURE — 99386 PR PREVENTIVE VISIT,NEW,40-64: ICD-10-PCS | Mod: S$GLB,,, | Performed by: NURSE PRACTITIONER

## 2021-01-14 PROCEDURE — 3079F PR MOST RECENT DIASTOLIC BLOOD PRESSURE 80-89 MM HG: ICD-10-PCS | Mod: CPTII,S$GLB,, | Performed by: NURSE PRACTITIONER

## 2021-01-14 PROCEDURE — 99386 PREV VISIT NEW AGE 40-64: CPT | Mod: S$GLB,,, | Performed by: NURSE PRACTITIONER

## 2021-01-14 RX ORDER — NORETHINDRONE ACETATE AND ETHINYL ESTRADIOL .02; 1 MG/1; MG/1
1 TABLET ORAL DAILY
Qty: 90 TABLET | Refills: 3 | Status: SHIPPED | OUTPATIENT
Start: 2021-01-14 | End: 2022-01-14

## 2021-01-14 RX ORDER — OLMESARTAN MEDOXOMIL 20 MG/1
20 TABLET ORAL DAILY
Qty: 90 TABLET | Refills: 3 | Status: SHIPPED | OUTPATIENT
Start: 2021-01-14 | End: 2021-06-22

## 2021-01-14 RX ORDER — OLMESARTAN MEDOXOMIL 20 MG/1
20 TABLET ORAL DAILY
COMMUNITY
End: 2021-01-14 | Stop reason: SDUPTHER

## 2021-01-14 RX ORDER — TRIAMCINOLONE ACETONIDE 1 MG/G
OINTMENT TOPICAL 2 TIMES DAILY
Qty: 80 G | Refills: 11 | Status: SHIPPED | OUTPATIENT
Start: 2021-01-14 | End: 2022-07-28

## 2021-01-20 ENCOUNTER — LAB VISIT (OUTPATIENT)
Dept: LAB | Facility: HOSPITAL | Age: 51
End: 2021-01-20
Attending: GENERAL PRACTICE
Payer: COMMERCIAL

## 2021-01-20 DIAGNOSIS — Z11.4 ENCOUNTER FOR SCREENING FOR HIV: ICD-10-CM

## 2021-01-20 DIAGNOSIS — Z13.220 SCREENING CHOLESTEROL LEVEL: ICD-10-CM

## 2021-01-20 DIAGNOSIS — Z11.59 ENCOUNTER FOR HEPATITIS C SCREENING TEST FOR LOW RISK PATIENT: ICD-10-CM

## 2021-01-20 DIAGNOSIS — Z01.89 ROUTINE LAB DRAW: ICD-10-CM

## 2021-01-20 LAB
ALBUMIN SERPL BCP-MCNC: 3.9 G/DL (ref 3.5–5.2)
ALP SERPL-CCNC: 69 U/L (ref 55–135)
ALT SERPL W/O P-5'-P-CCNC: 12 U/L (ref 10–44)
ANION GAP SERPL CALC-SCNC: 7 MMOL/L (ref 8–16)
AST SERPL-CCNC: 14 U/L (ref 10–40)
BASOPHILS # BLD AUTO: 0.07 K/UL (ref 0–0.2)
BASOPHILS NFR BLD: 1.5 % (ref 0–1.9)
BILIRUB SERPL-MCNC: 0.5 MG/DL (ref 0.1–1)
BUN SERPL-MCNC: 16 MG/DL (ref 6–20)
CALCIUM SERPL-MCNC: 8.9 MG/DL (ref 8.7–10.5)
CHLORIDE SERPL-SCNC: 106 MMOL/L (ref 95–110)
CHOLEST SERPL-MCNC: 192 MG/DL (ref 120–199)
CHOLEST/HDLC SERPL: 2.7 {RATIO} (ref 2–5)
CO2 SERPL-SCNC: 27 MMOL/L (ref 23–29)
CREAT SERPL-MCNC: 0.8 MG/DL (ref 0.5–1.4)
DIFFERENTIAL METHOD: ABNORMAL
EOSINOPHIL # BLD AUTO: 0.2 K/UL (ref 0–0.5)
EOSINOPHIL NFR BLD: 3.3 % (ref 0–8)
ERYTHROCYTE [DISTWIDTH] IN BLOOD BY AUTOMATED COUNT: 13 % (ref 11.5–14.5)
EST. GFR  (AFRICAN AMERICAN): >60 ML/MIN/1.73 M^2
EST. GFR  (NON AFRICAN AMERICAN): >60 ML/MIN/1.73 M^2
ESTIMATED AVG GLUCOSE: 108 MG/DL (ref 68–131)
GLUCOSE SERPL-MCNC: 110 MG/DL (ref 70–110)
HBA1C MFR BLD HPLC: 5.4 % (ref 4–5.6)
HCT VFR BLD AUTO: 40.3 % (ref 37–48.5)
HCV AB SERPL QL IA: NEGATIVE
HDLC SERPL-MCNC: 72 MG/DL (ref 40–75)
HDLC SERPL: 37.5 % (ref 20–50)
HGB BLD-MCNC: 12.8 G/DL (ref 12–16)
HIV 1+2 AB+HIV1 P24 AG SERPL QL IA: NEGATIVE
IMM GRANULOCYTES # BLD AUTO: 0.01 K/UL (ref 0–0.04)
IMM GRANULOCYTES NFR BLD AUTO: 0.2 % (ref 0–0.5)
LDLC SERPL CALC-MCNC: 111.2 MG/DL (ref 63–159)
LYMPHOCYTES # BLD AUTO: 2.2 K/UL (ref 1–4.8)
LYMPHOCYTES NFR BLD: 47.6 % (ref 18–48)
MCH RBC QN AUTO: 28.9 PG (ref 27–31)
MCHC RBC AUTO-ENTMCNC: 31.8 G/DL (ref 32–36)
MCV RBC AUTO: 91 FL (ref 82–98)
MONOCYTES # BLD AUTO: 0.3 K/UL (ref 0.3–1)
MONOCYTES NFR BLD: 6.9 % (ref 4–15)
NEUTROPHILS # BLD AUTO: 1.8 K/UL (ref 1.8–7.7)
NEUTROPHILS NFR BLD: 40.5 % (ref 38–73)
NONHDLC SERPL-MCNC: 120 MG/DL
NRBC BLD-RTO: 0 /100 WBC
PLATELET # BLD AUTO: 178 K/UL (ref 150–350)
PMV BLD AUTO: 11.4 FL (ref 9.2–12.9)
POTASSIUM SERPL-SCNC: 4.1 MMOL/L (ref 3.5–5.1)
PROT SERPL-MCNC: 6.9 G/DL (ref 6–8.4)
RBC # BLD AUTO: 4.43 M/UL (ref 4–5.4)
SODIUM SERPL-SCNC: 140 MMOL/L (ref 136–145)
TRIGL SERPL-MCNC: 44 MG/DL (ref 30–150)
TSH SERPL DL<=0.005 MIU/L-ACNC: 2.5 UIU/ML (ref 0.4–4)
WBC # BLD AUTO: 4.52 K/UL (ref 3.9–12.7)

## 2021-01-20 PROCEDURE — 80053 COMPREHEN METABOLIC PANEL: CPT

## 2021-01-20 PROCEDURE — 84443 ASSAY THYROID STIM HORMONE: CPT

## 2021-01-20 PROCEDURE — 86803 HEPATITIS C AB TEST: CPT

## 2021-01-20 PROCEDURE — 36415 COLL VENOUS BLD VENIPUNCTURE: CPT

## 2021-01-20 PROCEDURE — 85025 COMPLETE CBC W/AUTO DIFF WBC: CPT

## 2021-01-20 PROCEDURE — 83036 HEMOGLOBIN GLYCOSYLATED A1C: CPT

## 2021-01-20 PROCEDURE — 80061 LIPID PANEL: CPT

## 2021-01-20 PROCEDURE — 86703 HIV-1/HIV-2 1 RESULT ANTBDY: CPT

## 2021-02-01 RX ORDER — SEMAGLUTIDE 1.34 MG/ML
INJECTION, SOLUTION SUBCUTANEOUS
Qty: 3 ML | Refills: 0 | Status: SHIPPED | OUTPATIENT
Start: 2021-02-01 | End: 2021-03-22

## 2021-03-22 ENCOUNTER — OFFICE VISIT (OUTPATIENT)
Dept: BARIATRICS | Facility: CLINIC | Age: 51
End: 2021-03-22
Payer: COMMERCIAL

## 2021-03-22 VITALS
WEIGHT: 238.75 LBS | HEART RATE: 92 BPM | SYSTOLIC BLOOD PRESSURE: 156 MMHG | BODY MASS INDEX: 42.29 KG/M2 | OXYGEN SATURATION: 98 % | DIASTOLIC BLOOD PRESSURE: 84 MMHG

## 2021-03-22 VITALS
OXYGEN SATURATION: 99 % | BODY MASS INDEX: 42.23 KG/M2 | DIASTOLIC BLOOD PRESSURE: 70 MMHG | WEIGHT: 238.31 LBS | SYSTOLIC BLOOD PRESSURE: 140 MMHG | HEIGHT: 63 IN | HEART RATE: 79 BPM

## 2021-03-22 DIAGNOSIS — I10 ESSENTIAL HYPERTENSION: ICD-10-CM

## 2021-03-22 DIAGNOSIS — E66.01 MORBID OBESITY WITH BMI OF 40.0-44.9, ADULT: Primary | ICD-10-CM

## 2021-03-22 DIAGNOSIS — E88.810 METABOLIC SYNDROME: ICD-10-CM

## 2021-03-22 DIAGNOSIS — R73.01 IFG (IMPAIRED FASTING GLUCOSE): ICD-10-CM

## 2021-03-22 PROCEDURE — 3078F PR MOST RECENT DIASTOLIC BLOOD PRESSURE < 80 MM HG: ICD-10-PCS | Mod: CPTII,S$GLB,, | Performed by: PHYSICIAN ASSISTANT

## 2021-03-22 PROCEDURE — 99205 OFFICE O/P NEW HI 60 MIN: CPT | Mod: S$GLB,,, | Performed by: PHYSICIAN ASSISTANT

## 2021-03-22 PROCEDURE — 1126F AMNT PAIN NOTED NONE PRSNT: CPT | Mod: S$GLB,,, | Performed by: PHYSICIAN ASSISTANT

## 2021-03-22 PROCEDURE — 3008F BODY MASS INDEX DOCD: CPT | Mod: CPTII,S$GLB,, | Performed by: PHYSICIAN ASSISTANT

## 2021-03-22 PROCEDURE — 1126F PR PAIN SEVERITY QUANTIFIED, NO PAIN PRESENT: ICD-10-PCS | Mod: S$GLB,,, | Performed by: INTERNAL MEDICINE

## 2021-03-22 PROCEDURE — 99213 OFFICE O/P EST LOW 20 MIN: CPT | Mod: S$GLB,,, | Performed by: INTERNAL MEDICINE

## 2021-03-22 PROCEDURE — 3079F PR MOST RECENT DIASTOLIC BLOOD PRESSURE 80-89 MM HG: ICD-10-PCS | Mod: CPTII,S$GLB,, | Performed by: INTERNAL MEDICINE

## 2021-03-22 PROCEDURE — 3078F DIAST BP <80 MM HG: CPT | Mod: CPTII,S$GLB,, | Performed by: PHYSICIAN ASSISTANT

## 2021-03-22 PROCEDURE — 3079F DIAST BP 80-89 MM HG: CPT | Mod: CPTII,S$GLB,, | Performed by: INTERNAL MEDICINE

## 2021-03-22 PROCEDURE — 3008F BODY MASS INDEX DOCD: CPT | Mod: CPTII,S$GLB,, | Performed by: INTERNAL MEDICINE

## 2021-03-22 PROCEDURE — 3077F PR MOST RECENT SYSTOLIC BLOOD PRESSURE >= 140 MM HG: ICD-10-PCS | Mod: CPTII,S$GLB,, | Performed by: PHYSICIAN ASSISTANT

## 2021-03-22 PROCEDURE — 3077F PR MOST RECENT SYSTOLIC BLOOD PRESSURE >= 140 MM HG: ICD-10-PCS | Mod: CPTII,S$GLB,, | Performed by: INTERNAL MEDICINE

## 2021-03-22 PROCEDURE — 99999 PR PBB SHADOW E&M-EST. PATIENT-LVL IV: CPT | Mod: PBBFAC,,, | Performed by: PHYSICIAN ASSISTANT

## 2021-03-22 PROCEDURE — 99999 PR PBB SHADOW E&M-EST. PATIENT-LVL III: ICD-10-PCS | Mod: PBBFAC,,, | Performed by: INTERNAL MEDICINE

## 2021-03-22 PROCEDURE — 3077F SYST BP >= 140 MM HG: CPT | Mod: CPTII,S$GLB,, | Performed by: INTERNAL MEDICINE

## 2021-03-22 PROCEDURE — 99205 PR OFFICE/OUTPT VISIT, NEW, LEVL V, 60-74 MIN: ICD-10-PCS | Mod: S$GLB,,, | Performed by: PHYSICIAN ASSISTANT

## 2021-03-22 PROCEDURE — 3008F PR BODY MASS INDEX (BMI) DOCUMENTED: ICD-10-PCS | Mod: CPTII,S$GLB,, | Performed by: PHYSICIAN ASSISTANT

## 2021-03-22 PROCEDURE — 99999 PR PBB SHADOW E&M-EST. PATIENT-LVL IV: ICD-10-PCS | Mod: PBBFAC,,, | Performed by: PHYSICIAN ASSISTANT

## 2021-03-22 PROCEDURE — 3077F SYST BP >= 140 MM HG: CPT | Mod: CPTII,S$GLB,, | Performed by: PHYSICIAN ASSISTANT

## 2021-03-22 PROCEDURE — 99213 PR OFFICE/OUTPT VISIT, EST, LEVL III, 20-29 MIN: ICD-10-PCS | Mod: S$GLB,,, | Performed by: INTERNAL MEDICINE

## 2021-03-22 PROCEDURE — 99999 PR PBB SHADOW E&M-EST. PATIENT-LVL III: CPT | Mod: PBBFAC,,, | Performed by: INTERNAL MEDICINE

## 2021-03-22 PROCEDURE — 1126F PR PAIN SEVERITY QUANTIFIED, NO PAIN PRESENT: ICD-10-PCS | Mod: S$GLB,,, | Performed by: PHYSICIAN ASSISTANT

## 2021-03-22 PROCEDURE — 1126F AMNT PAIN NOTED NONE PRSNT: CPT | Mod: S$GLB,,, | Performed by: INTERNAL MEDICINE

## 2021-03-22 PROCEDURE — 3008F PR BODY MASS INDEX (BMI) DOCUMENTED: ICD-10-PCS | Mod: CPTII,S$GLB,, | Performed by: INTERNAL MEDICINE

## 2021-03-22 RX ORDER — AZITHROMYCIN 500 MG/1
500 TABLET, FILM COATED ORAL
COMMUNITY

## 2021-03-22 RX ORDER — SEMAGLUTIDE 1.34 MG/ML
0.5 INJECTION, SOLUTION SUBCUTANEOUS
Qty: 1.5 ML | Refills: 2 | Status: SHIPPED | OUTPATIENT
Start: 2021-03-22 | End: 2021-03-25

## 2021-03-22 RX ORDER — AMOXICILLIN 500 MG/1
500 TABLET, FILM COATED ORAL 2 TIMES DAILY
COMMUNITY
Start: 2021-03-01

## 2021-03-24 ENCOUNTER — TELEPHONE (OUTPATIENT)
Dept: BARIATRICS | Facility: CLINIC | Age: 51
End: 2021-03-24

## 2021-03-25 ENCOUNTER — TELEPHONE (OUTPATIENT)
Dept: BARIATRICS | Facility: CLINIC | Age: 51
End: 2021-03-25

## 2021-03-25 RX ORDER — TOPIRAMATE 25 MG/1
25 TABLET ORAL 2 TIMES DAILY
Qty: 60 TABLET | Refills: 3 | Status: SHIPPED | OUTPATIENT
Start: 2021-03-25 | End: 2022-07-28

## 2021-06-22 ENCOUNTER — OFFICE VISIT (OUTPATIENT)
Dept: BARIATRICS | Facility: CLINIC | Age: 51
End: 2021-06-22
Payer: COMMERCIAL

## 2021-06-22 VITALS
HEIGHT: 63 IN | BODY MASS INDEX: 41.71 KG/M2 | WEIGHT: 235.44 LBS | DIASTOLIC BLOOD PRESSURE: 74 MMHG | OXYGEN SATURATION: 99 % | SYSTOLIC BLOOD PRESSURE: 126 MMHG | HEART RATE: 83 BPM

## 2021-06-22 DIAGNOSIS — E88.810 METABOLIC SYNDROME: ICD-10-CM

## 2021-06-22 DIAGNOSIS — E66.01 CLASS 3 SEVERE OBESITY DUE TO EXCESS CALORIES WITH SERIOUS COMORBIDITY AND BODY MASS INDEX (BMI) OF 40.0 TO 44.9 IN ADULT: Primary | ICD-10-CM

## 2021-06-22 PROCEDURE — 99999 PR PBB SHADOW E&M-EST. PATIENT-LVL III: CPT | Mod: PBBFAC,,, | Performed by: INTERNAL MEDICINE

## 2021-06-22 PROCEDURE — 3008F PR BODY MASS INDEX (BMI) DOCUMENTED: ICD-10-PCS | Mod: CPTII,S$GLB,, | Performed by: INTERNAL MEDICINE

## 2021-06-22 PROCEDURE — 3008F BODY MASS INDEX DOCD: CPT | Mod: CPTII,S$GLB,, | Performed by: INTERNAL MEDICINE

## 2021-06-22 PROCEDURE — 99999 PR PBB SHADOW E&M-EST. PATIENT-LVL III: ICD-10-PCS | Mod: PBBFAC,,, | Performed by: INTERNAL MEDICINE

## 2021-06-22 PROCEDURE — 1126F PR PAIN SEVERITY QUANTIFIED, NO PAIN PRESENT: ICD-10-PCS | Mod: S$GLB,,, | Performed by: INTERNAL MEDICINE

## 2021-06-22 PROCEDURE — 99213 OFFICE O/P EST LOW 20 MIN: CPT | Mod: S$GLB,,, | Performed by: INTERNAL MEDICINE

## 2021-06-22 PROCEDURE — 99213 PR OFFICE/OUTPT VISIT, EST, LEVL III, 20-29 MIN: ICD-10-PCS | Mod: S$GLB,,, | Performed by: INTERNAL MEDICINE

## 2021-06-22 PROCEDURE — 1126F AMNT PAIN NOTED NONE PRSNT: CPT | Mod: S$GLB,,, | Performed by: INTERNAL MEDICINE

## 2021-06-22 RX ORDER — IBUPROFEN 800 MG/1
800 TABLET ORAL 2 TIMES DAILY PRN
COMMUNITY
Start: 2021-06-21

## 2021-06-22 RX ORDER — TIZANIDINE 4 MG/1
4 TABLET ORAL 3 TIMES DAILY
COMMUNITY
Start: 2021-06-21

## 2021-06-22 RX ORDER — MELOXICAM 15 MG/1
15 TABLET ORAL DAILY
COMMUNITY
Start: 2021-06-21

## 2021-06-22 RX ORDER — TRIAZOLAM 0.25 MG/1
0.25 TABLET ORAL NIGHTLY
COMMUNITY
Start: 2021-04-20

## 2021-06-23 ENCOUNTER — PATIENT MESSAGE (OUTPATIENT)
Dept: BARIATRICS | Facility: CLINIC | Age: 51
End: 2021-06-23

## 2021-06-23 ENCOUNTER — TELEPHONE (OUTPATIENT)
Dept: BARIATRICS | Facility: CLINIC | Age: 51
End: 2021-06-23

## 2021-07-09 ENCOUNTER — TELEPHONE (OUTPATIENT)
Dept: BARIATRICS | Facility: CLINIC | Age: 51
End: 2021-07-09

## 2021-07-13 ENCOUNTER — PATIENT MESSAGE (OUTPATIENT)
Dept: BARIATRICS | Facility: CLINIC | Age: 51
End: 2021-07-13

## 2021-09-17 ENCOUNTER — TELEPHONE (OUTPATIENT)
Dept: BARIATRICS | Facility: CLINIC | Age: 51
End: 2021-09-17

## 2021-09-20 ENCOUNTER — TELEPHONE (OUTPATIENT)
Dept: BARIATRICS | Facility: CLINIC | Age: 51
End: 2021-09-20

## 2021-10-14 ENCOUNTER — OFFICE VISIT (OUTPATIENT)
Dept: BARIATRICS | Facility: CLINIC | Age: 51
End: 2021-10-14
Payer: COMMERCIAL

## 2021-10-14 VITALS
HEIGHT: 63 IN | SYSTOLIC BLOOD PRESSURE: 120 MMHG | OXYGEN SATURATION: 99 % | BODY MASS INDEX: 41.68 KG/M2 | WEIGHT: 235.25 LBS | DIASTOLIC BLOOD PRESSURE: 84 MMHG | HEART RATE: 86 BPM

## 2021-10-14 DIAGNOSIS — E66.01 MORBID OBESITY WITH BMI OF 40.0-44.9, ADULT: Primary | ICD-10-CM

## 2021-10-14 PROCEDURE — 3074F PR MOST RECENT SYSTOLIC BLOOD PRESSURE < 130 MM HG: ICD-10-PCS | Mod: CPTII,S$GLB,, | Performed by: INTERNAL MEDICINE

## 2021-10-14 PROCEDURE — 99213 PR OFFICE/OUTPT VISIT, EST, LEVL III, 20-29 MIN: ICD-10-PCS | Mod: S$GLB,,, | Performed by: INTERNAL MEDICINE

## 2021-10-14 PROCEDURE — 1160F RVW MEDS BY RX/DR IN RCRD: CPT | Mod: CPTII,S$GLB,, | Performed by: INTERNAL MEDICINE

## 2021-10-14 PROCEDURE — 3074F SYST BP LT 130 MM HG: CPT | Mod: CPTII,S$GLB,, | Performed by: INTERNAL MEDICINE

## 2021-10-14 PROCEDURE — 4010F ACE/ARB THERAPY RXD/TAKEN: CPT | Mod: CPTII,S$GLB,, | Performed by: INTERNAL MEDICINE

## 2021-10-14 PROCEDURE — 3079F DIAST BP 80-89 MM HG: CPT | Mod: CPTII,S$GLB,, | Performed by: INTERNAL MEDICINE

## 2021-10-14 PROCEDURE — 3044F HG A1C LEVEL LT 7.0%: CPT | Mod: CPTII,S$GLB,, | Performed by: INTERNAL MEDICINE

## 2021-10-14 PROCEDURE — 99999 PR PBB SHADOW E&M-EST. PATIENT-LVL IV: ICD-10-PCS | Mod: PBBFAC,,, | Performed by: INTERNAL MEDICINE

## 2021-10-14 PROCEDURE — 99213 OFFICE O/P EST LOW 20 MIN: CPT | Mod: S$GLB,,, | Performed by: INTERNAL MEDICINE

## 2021-10-14 PROCEDURE — 1159F MED LIST DOCD IN RCRD: CPT | Mod: CPTII,S$GLB,, | Performed by: INTERNAL MEDICINE

## 2021-10-14 PROCEDURE — 3044F PR MOST RECENT HEMOGLOBIN A1C LEVEL <7.0%: ICD-10-PCS | Mod: CPTII,S$GLB,, | Performed by: INTERNAL MEDICINE

## 2021-10-14 PROCEDURE — 3008F BODY MASS INDEX DOCD: CPT | Mod: CPTII,S$GLB,, | Performed by: INTERNAL MEDICINE

## 2021-10-14 PROCEDURE — 1160F PR REVIEW ALL MEDS BY PRESCRIBER/CLIN PHARMACIST DOCUMENTED: ICD-10-PCS | Mod: CPTII,S$GLB,, | Performed by: INTERNAL MEDICINE

## 2021-10-14 PROCEDURE — 3079F PR MOST RECENT DIASTOLIC BLOOD PRESSURE 80-89 MM HG: ICD-10-PCS | Mod: CPTII,S$GLB,, | Performed by: INTERNAL MEDICINE

## 2021-10-14 PROCEDURE — 99999 PR PBB SHADOW E&M-EST. PATIENT-LVL IV: CPT | Mod: PBBFAC,,, | Performed by: INTERNAL MEDICINE

## 2021-10-14 PROCEDURE — 1159F PR MEDICATION LIST DOCUMENTED IN MEDICAL RECORD: ICD-10-PCS | Mod: CPTII,S$GLB,, | Performed by: INTERNAL MEDICINE

## 2021-10-14 PROCEDURE — 3008F PR BODY MASS INDEX (BMI) DOCUMENTED: ICD-10-PCS | Mod: CPTII,S$GLB,, | Performed by: INTERNAL MEDICINE

## 2021-10-14 PROCEDURE — 4010F PR ACE/ARB THEARPY RXD/TAKEN: ICD-10-PCS | Mod: CPTII,S$GLB,, | Performed by: INTERNAL MEDICINE

## 2021-10-14 RX ORDER — SEMAGLUTIDE 1.7 MG/.75ML
1.7 INJECTION, SOLUTION SUBCUTANEOUS
Qty: 3 ML | Refills: 0 | Status: SHIPPED | OUTPATIENT
Start: 2022-01-14 | End: 2022-06-02

## 2021-10-14 RX ORDER — SEMAGLUTIDE 0.5 MG/.5ML
0.5 INJECTION, SOLUTION SUBCUTANEOUS
Qty: 2 ML | Refills: 0 | Status: SHIPPED | OUTPATIENT
Start: 2021-11-14 | End: 2022-06-02

## 2021-10-14 RX ORDER — SEMAGLUTIDE 1 MG/.5ML
1 INJECTION, SOLUTION SUBCUTANEOUS
Qty: 2 ML | Refills: 0 | Status: SHIPPED | OUTPATIENT
Start: 2021-12-14 | End: 2022-06-02

## 2021-10-14 RX ORDER — SEMAGLUTIDE 2.4 MG/.75ML
2.4 INJECTION, SOLUTION SUBCUTANEOUS
Qty: 3 ML | Refills: 0 | Status: SHIPPED | OUTPATIENT
Start: 2022-02-14 | End: 2022-06-01 | Stop reason: SDUPTHER

## 2021-10-14 RX ORDER — SEMAGLUTIDE 0.25 MG/.5ML
0.25 INJECTION, SOLUTION SUBCUTANEOUS
Qty: 2 ML | Refills: 0 | Status: SHIPPED | OUTPATIENT
Start: 2021-10-14 | End: 2022-06-02

## 2021-10-15 ENCOUNTER — TELEPHONE (OUTPATIENT)
Dept: BARIATRICS | Facility: CLINIC | Age: 51
End: 2021-10-15

## 2021-10-21 ENCOUNTER — TELEPHONE (OUTPATIENT)
Dept: BARIATRICS | Facility: CLINIC | Age: 51
End: 2021-10-21

## 2021-11-09 ENCOUNTER — TELEPHONE (OUTPATIENT)
Dept: BARIATRICS | Facility: CLINIC | Age: 51
End: 2021-11-09
Payer: COMMERCIAL

## 2021-11-30 ENCOUNTER — TELEPHONE (OUTPATIENT)
Dept: BARIATRICS | Facility: CLINIC | Age: 51
End: 2021-11-30
Payer: COMMERCIAL

## 2021-12-01 ENCOUNTER — TELEPHONE (OUTPATIENT)
Dept: BARIATRICS | Facility: CLINIC | Age: 51
End: 2021-12-01
Payer: COMMERCIAL

## 2021-12-02 ENCOUNTER — TELEPHONE (OUTPATIENT)
Dept: BARIATRICS | Facility: CLINIC | Age: 51
End: 2021-12-02
Payer: COMMERCIAL

## 2021-12-07 ENCOUNTER — TELEPHONE (OUTPATIENT)
Dept: BARIATRICS | Facility: CLINIC | Age: 51
End: 2021-12-07
Payer: COMMERCIAL

## 2022-01-11 ENCOUNTER — TELEPHONE (OUTPATIENT)
Dept: BARIATRICS | Facility: CLINIC | Age: 52
End: 2022-01-11
Payer: COMMERCIAL

## 2022-01-11 NOTE — TELEPHONE ENCOUNTER
Spoke with pt regarding bariatric surgery workup, pt explained she is undecided about surgery and currently following up with Dr. Sneed. Explained bariatric consult would need update after 1 year, pt verbalized understanding.

## 2022-01-19 ENCOUNTER — TELEPHONE (OUTPATIENT)
Dept: BARIATRICS | Facility: CLINIC | Age: 52
End: 2022-01-19
Payer: COMMERCIAL

## 2022-01-19 NOTE — TELEPHONE ENCOUNTER
----- Message from Palak Easley sent at 1/19/2022  9:14 AM CST -----  Contact: Pt  Pt's requesting a call back re: visit today. Pt states she was advised visit would be virtual.    Confirmed patient's contact info below:  Contact Name: Lizabeth Gomez  Phone Number: [phone]

## 2022-01-19 NOTE — TELEPHONE ENCOUNTER
Spoke with pt in regard to missed call. Straightened out the mix up regarding virtual appt and pt advised that she would call back once she finished up at work, to see if MD Sneed has any open slots for the afternoon.

## 2022-01-19 NOTE — TELEPHONE ENCOUNTER
----- Message from Ozzie Landaverde sent at 1/19/2022 11:09 AM CST -----  Regarding: Pt Inquiry  Pt called and requesting a call back in regards to her appt.      [phone] (home)

## 2022-01-20 ENCOUNTER — OFFICE VISIT (OUTPATIENT)
Dept: BARIATRICS | Facility: CLINIC | Age: 52
End: 2022-01-20
Payer: COMMERCIAL

## 2022-01-20 DIAGNOSIS — E66.01 CLASS 3 SEVERE OBESITY DUE TO EXCESS CALORIES WITH SERIOUS COMORBIDITY AND BODY MASS INDEX (BMI) OF 40.0 TO 44.9 IN ADULT: Primary | ICD-10-CM

## 2022-01-20 PROCEDURE — 1160F RVW MEDS BY RX/DR IN RCRD: CPT | Mod: CPTII,95,, | Performed by: INTERNAL MEDICINE

## 2022-01-20 PROCEDURE — 1159F PR MEDICATION LIST DOCUMENTED IN MEDICAL RECORD: ICD-10-PCS | Mod: CPTII,95,, | Performed by: INTERNAL MEDICINE

## 2022-01-20 PROCEDURE — 1159F MED LIST DOCD IN RCRD: CPT | Mod: CPTII,95,, | Performed by: INTERNAL MEDICINE

## 2022-01-20 PROCEDURE — 99212 OFFICE O/P EST SF 10 MIN: CPT | Mod: 95,,, | Performed by: INTERNAL MEDICINE

## 2022-01-20 PROCEDURE — 1160F PR REVIEW ALL MEDS BY PRESCRIBER/CLIN PHARMACIST DOCUMENTED: ICD-10-PCS | Mod: CPTII,95,, | Performed by: INTERNAL MEDICINE

## 2022-01-20 PROCEDURE — 99212 PR OFFICE/OUTPT VISIT, EST, LEVL II, 10-19 MIN: ICD-10-PCS | Mod: 95,,, | Performed by: INTERNAL MEDICINE

## 2022-01-20 NOTE — PROGRESS NOTES
Subjective:       Patient ID: Lizabeth Gomez is a 51 y.o. female.    Chief Complaint: Follow-up  The patient location is: work  The chief complaint leading to consultation is:   Chief Complaint   Patient presents with    Follow-up        Visit type: audiovisual    Face to Face time with patient: 6 min  16 minutes of total time spent on the encounter, which includes face to face time and non-face to face time preparing to see the patient (eg, review of tests), Obtaining and/or reviewing separately obtained history, Documenting clinical information in the electronic or other health record, Independently interpreting results (not separately reported) and communicating results to the patient/family/caregiver, or Care coordination (not separately reported).         Each patient to whom he or she provides medical services by telemedicine is:  (1) informed of the relationship between the physician and patient and the respective role of any other health care provider with respect to management of the patient; and (2) notified that he or she may decline to receive medical services by telemedicine and may withdraw from such care at any time.    Notes:     Pt here today for follow-up. Has gained 3 lbs since last seen, Net  Neg 0 lbs  . She had a surgery consult, but was hesitant to proceed. .  Started trulicity, then switched to ozempic.She felt she had some results with the Ozempic, but it would not continue to cover.  Rxed Wegovy last OV. Currently on the 0.5 mg. Has 1 injection left.  She is no longer able to fill it with the discount program. Her insurance is not allowing to use coupon.       Advised to stop sugary drinks and decrease fast foods. She has been doing that. States her BP has been good. Dancing for exercise.     She has taken phentermine in the past. Would get short term results.     checked today     Follow-up  Pertinent negatives include no arthralgias, chest pain, chills or fever.     Review of  Systems   Constitutional: Negative for chills and fever.   Respiratory: Negative for shortness of breath.         Mild Snoring   Cardiovascular: Positive for leg swelling. Negative for chest pain.   Gastrointestinal: Negative for constipation and diarrhea.        Denies GERD   Genitourinary: Negative for difficulty urinating and dysuria.   Musculoskeletal: Positive for back pain. Negative for arthralgias.   Neurological: Negative for dizziness and light-headedness.   Psychiatric/Behavioral: Negative for dysphoric mood. The patient is nervous/anxious.        Objective:     There were no vitals taken for this visit.  Vitals taken on 1/18/2022:              weight: 108.4 kg (238.5 lbs)    B/P: 132/78    heart rate: 97     Oxy: 97%     Physical Exam  Vitals reviewed.   Constitutional:       General: She is not in acute distress.     Appearance: She is well-developed.      Comments: Obese     HENT:      Head: Normocephalic and atraumatic.   Eyes:      General: No scleral icterus.     Pupils: Pupils are equal, round, and reactive to light.   Cardiovascular:      Rate and Rhythm: Normal rate.   Pulmonary:      Effort: Pulmonary effort is normal.   Musculoskeletal:         General: Normal range of motion.      Cervical back: Normal range of motion and neck supple.   Skin:     General: Skin is warm and dry.      Findings: No erythema.   Neurological:      Mental Status: She is alert and oriented to person, place, and time.      Cranial Nerves: No cranial nerve deficit.   Psychiatric:         Behavior: Behavior normal.         Judgment: Judgment normal.         Assessment:       1. Class 3 severe obesity due to excess calories with serious comorbidity and body mass index (BMI) of 40.0 to 44.9 in adult        Plan:           Lizabeth was seen today for follow-up.    Diagnoses and all orders for this visit:    Class 3 severe obesity due to excess calories with serious comorbidity and body mass index (BMI) of 40.0 to 44.9 in  adult       She will contact her insurance about if she able to use discount programs with her PBM.     If able to continue wegovy.     Start Wegovy 1 mg once a week. The dose will increase monthly.     Www.[email or web address] for discount program     Decrease portions as soon as you start wegovy. Avoid fried, rich or greasy foods.      Some nausea in the first 2 weeks is not unusual.     If you get pain across the upper abdomen and around to your back, please call the office.     Exercise 30 min 3 times a week. Increase low impact activity as tolerated.  Avoid high impact activity, very heavy lifting or other exercise regimens that may cause discomfort.         Exercise 30 min 5 times a week. Gradually increase.     Patient counseled in strategies for long term weight loss and maintenance: Keeping a food diary, exercise for 1 hour a day and eating breakfast everyday.     3 meals a day made up of the following:  Unlimited green vegetables, tomatoes, mushrooms, spaghetti squash, cauliflower, meat, poultry, seafood, eggs and hard cheeses.   Milk and plain yogurt  Dressings, seasonings, condiments, etc should have less than 2 g sugars.   Beans (1-1.5 cups) or nuts (1/4 cup) can have 1 x a day.   1-2 servings of citrus fruits, berries, pineapple or melon a day (1/2 cup)  Avoid fried foods    No grains, rice, pasta, potatoes, bread, corn, peas, oatmeal, grits, tortillas, crackers, chips    No soda, sweet tea, juices or lemonade. All drinks should be 5 calories or less.     [email or web address] for recipes. Moderate carb intake         Snack Ideas given.

## 2022-02-03 ENCOUNTER — TELEPHONE (OUTPATIENT)
Dept: BARIATRICS | Facility: CLINIC | Age: 52
End: 2022-02-03
Payer: COMMERCIAL

## 2022-02-03 NOTE — TELEPHONE ENCOUNTER
----- Message from Jaquelin Rivera sent at 2/3/2022 12:01 PM CST -----  Regarding: Patient Advice  Contact: Pt [phone]  Patient is calling Dr. Sneed's office in regards to advising she have her med.  Please call. [phone]

## 2022-03-07 ENCOUNTER — PATIENT MESSAGE (OUTPATIENT)
Dept: BARIATRICS | Facility: CLINIC | Age: 52
End: 2022-03-07
Payer: COMMERCIAL

## 2022-04-07 ENCOUNTER — PATIENT MESSAGE (OUTPATIENT)
Dept: BARIATRICS | Facility: CLINIC | Age: 52
End: 2022-04-07
Payer: COMMERCIAL

## 2022-04-12 ENCOUNTER — PATIENT MESSAGE (OUTPATIENT)
Dept: BARIATRICS | Facility: CLINIC | Age: 52
End: 2022-04-12
Payer: COMMERCIAL

## 2022-05-05 ENCOUNTER — PATIENT MESSAGE (OUTPATIENT)
Dept: BARIATRICS | Facility: CLINIC | Age: 52
End: 2022-05-05
Payer: COMMERCIAL

## 2022-05-10 ENCOUNTER — PATIENT MESSAGE (OUTPATIENT)
Dept: BARIATRICS | Facility: CLINIC | Age: 52
End: 2022-05-10
Payer: COMMERCIAL

## 2022-06-01 DIAGNOSIS — E66.01 MORBID OBESITY WITH BMI OF 40.0-44.9, ADULT: ICD-10-CM

## 2022-06-02 RX ORDER — SEMAGLUTIDE 2.4 MG/.75ML
2.4 INJECTION, SOLUTION SUBCUTANEOUS
Qty: 3 ML | Refills: 1 | Status: SHIPPED | OUTPATIENT
Start: 2022-06-02 | End: 2022-07-28 | Stop reason: SDUPTHER

## 2022-06-02 NOTE — TELEPHONE ENCOUNTER
----- Message from Shankar Schmidt sent at 6/2/2022  2:19 PM CDT -----  Regarding: call bk about her Rx    The Pt would like a call back about her Rx that she ran out of.    Ph # [phone]

## 2022-06-10 ENCOUNTER — PATIENT MESSAGE (OUTPATIENT)
Dept: BARIATRICS | Facility: CLINIC | Age: 52
End: 2022-06-10
Payer: COMMERCIAL

## 2022-06-14 ENCOUNTER — PATIENT MESSAGE (OUTPATIENT)
Dept: BARIATRICS | Facility: CLINIC | Age: 52
End: 2022-06-14
Payer: COMMERCIAL

## 2022-07-05 ENCOUNTER — PATIENT MESSAGE (OUTPATIENT)
Dept: BARIATRICS | Facility: CLINIC | Age: 52
End: 2022-07-05
Payer: COMMERCIAL

## 2022-07-28 ENCOUNTER — OFFICE VISIT (OUTPATIENT)
Dept: BARIATRICS | Facility: CLINIC | Age: 52
End: 2022-07-28
Payer: COMMERCIAL

## 2022-07-28 VITALS
WEIGHT: 222 LBS | DIASTOLIC BLOOD PRESSURE: 82 MMHG | HEIGHT: 63 IN | HEART RATE: 83 BPM | SYSTOLIC BLOOD PRESSURE: 128 MMHG | BODY MASS INDEX: 39.34 KG/M2 | OXYGEN SATURATION: 98 %

## 2022-07-28 DIAGNOSIS — E66.01 CLASS 2 SEVERE OBESITY WITH BODY MASS INDEX (BMI) OF 35 TO 39.9 WITH SERIOUS COMORBIDITY: Primary | ICD-10-CM

## 2022-07-28 PROCEDURE — 3074F PR MOST RECENT SYSTOLIC BLOOD PRESSURE < 130 MM HG: ICD-10-PCS | Mod: CPTII,S$GLB,, | Performed by: INTERNAL MEDICINE

## 2022-07-28 PROCEDURE — 99213 OFFICE O/P EST LOW 20 MIN: CPT | Mod: S$GLB,,, | Performed by: INTERNAL MEDICINE

## 2022-07-28 PROCEDURE — 3079F DIAST BP 80-89 MM HG: CPT | Mod: CPTII,S$GLB,, | Performed by: INTERNAL MEDICINE

## 2022-07-28 PROCEDURE — 99999 PR PBB SHADOW E&M-EST. PATIENT-LVL IV: CPT | Mod: PBBFAC,,, | Performed by: INTERNAL MEDICINE

## 2022-07-28 PROCEDURE — 3008F BODY MASS INDEX DOCD: CPT | Mod: CPTII,S$GLB,, | Performed by: INTERNAL MEDICINE

## 2022-07-28 PROCEDURE — 99213 PR OFFICE/OUTPT VISIT, EST, LEVL III, 20-29 MIN: ICD-10-PCS | Mod: S$GLB,,, | Performed by: INTERNAL MEDICINE

## 2022-07-28 PROCEDURE — 1159F MED LIST DOCD IN RCRD: CPT | Mod: CPTII,S$GLB,, | Performed by: INTERNAL MEDICINE

## 2022-07-28 PROCEDURE — 1160F RVW MEDS BY RX/DR IN RCRD: CPT | Mod: CPTII,S$GLB,, | Performed by: INTERNAL MEDICINE

## 2022-07-28 PROCEDURE — 3079F PR MOST RECENT DIASTOLIC BLOOD PRESSURE 80-89 MM HG: ICD-10-PCS | Mod: CPTII,S$GLB,, | Performed by: INTERNAL MEDICINE

## 2022-07-28 PROCEDURE — 99999 PR PBB SHADOW E&M-EST. PATIENT-LVL IV: ICD-10-PCS | Mod: PBBFAC,,, | Performed by: INTERNAL MEDICINE

## 2022-07-28 PROCEDURE — 3074F SYST BP LT 130 MM HG: CPT | Mod: CPTII,S$GLB,, | Performed by: INTERNAL MEDICINE

## 2022-07-28 PROCEDURE — 1160F PR REVIEW ALL MEDS BY PRESCRIBER/CLIN PHARMACIST DOCUMENTED: ICD-10-PCS | Mod: CPTII,S$GLB,, | Performed by: INTERNAL MEDICINE

## 2022-07-28 PROCEDURE — 3008F PR BODY MASS INDEX (BMI) DOCUMENTED: ICD-10-PCS | Mod: CPTII,S$GLB,, | Performed by: INTERNAL MEDICINE

## 2022-07-28 PROCEDURE — 1159F PR MEDICATION LIST DOCUMENTED IN MEDICAL RECORD: ICD-10-PCS | Mod: CPTII,S$GLB,, | Performed by: INTERNAL MEDICINE

## 2022-07-28 RX ORDER — SEMAGLUTIDE 2.4 MG/.75ML
2.4 INJECTION, SOLUTION SUBCUTANEOUS
Qty: 3 ML | Refills: 2 | Status: SHIPPED | OUTPATIENT
Start: 2022-07-28

## 2022-07-28 NOTE — PROGRESS NOTES
"Subjective:       Patient ID: Lizabeth Gomez is a 52 y.o. female.    Chief Complaint:   Chief Complaint   Patient presents with    Follow-up         Pt here today for follow-up. Has lost 16 lbs since last seen, Net  Neg 16 lbs  . She had a surgery consult, but was hesitant to proceed. .  Started trulicity, then switched to ozempic.She felt she had some results with the Ozempic, but it would not continue to cover.  Rxed Wegovy last OV.   She took it  For a while and then discount program ran out. Resumed it and she was up to the 2.4 mg, but states she ran out 3 weeks ago.     States her BP has been good. Dancing for exercise.     She has taken phentermine in the past. Would get short term results.     checked today     Lab Results   Component Value Date    HGBA1C 5.4 01/20/2021     Lab Results   Component Value Date    LDLCALC 111.2 01/20/2021    CREATININE 0.8 01/20/2021         Follow-up  Pertinent negatives include no arthralgias, chest pain, chills or fever.     Review of Systems   Constitutional: Negative for chills and fever.   Respiratory: Negative for shortness of breath.         Mild Snoring   Cardiovascular: Positive for leg swelling. Negative for chest pain.   Gastrointestinal: Negative for constipation and diarrhea.        Denies GERD   Genitourinary: Negative for difficulty urinating and dysuria.   Musculoskeletal: Positive for back pain. Negative for arthralgias.   Neurological: Negative for dizziness and light-headedness.   Psychiatric/Behavioral: Negative for dysphoric mood. The patient is nervous/anxious.        Objective:     /82 (BP Location: Right arm, Patient Position: Sitting, BP Method: Large (Manual))   Pulse 83   Ht 5' 3" (1.6 m)   Wt 100.7 kg (222 lb 0.1 oz)   LMP 07/24/2022 (Exact Date)   SpO2 98%   BMI 39.33 kg/m²      Physical Exam  Vitals reviewed.   Constitutional:       General: She is not in acute distress.     Appearance: She is well-developed.      Comments: " Obese     HENT:      Head: Normocephalic and atraumatic.   Eyes:      General: No scleral icterus.     Pupils: Pupils are equal, round, and reactive to light.   Cardiovascular:      Rate and Rhythm: Normal rate.   Pulmonary:      Effort: Pulmonary effort is normal.   Musculoskeletal:         General: Normal range of motion.      Cervical back: Normal range of motion and neck supple.   Skin:     General: Skin is warm and dry.      Findings: No erythema.   Neurological:      Mental Status: She is alert and oriented to person, place, and time.      Cranial Nerves: No cranial nerve deficit.   Psychiatric:         Behavior: Behavior normal.         Judgment: Judgment normal.         Assessment:       1. Class 2 severe obesity with body mass index (BMI) of 35 to 39.9 with serious comorbidity        Plan:           Lizabeth was seen today for follow-up.    Diagnoses and all orders for this visit:    Class 2 severe obesity with body mass index (BMI) of 35 to 39.9 with serious comorbidity  -     semaglutide, weight loss, (WEGOVY) 2.4 mg/0.75 mL PnIj; Inject 2.4 mg (one pen) into the skin every 7 days.         Wegovy 2.4 mg once a week.         Decrease portions as soon as you start wegovy. Avoid fried, rich or greasy foods.      Some nausea in the first 2 weeks is not unusual.     If you get pain across the upper abdomen and around to your back, please call the office.     Exercise 30 min 3 times a week. Increase low impact activity as tolerated.  Avoid high impact activity, very heavy lifting or other exercise regimens that may cause discomfort.         Exercise 30 min 5 times a week. Gradually increase.     Patient counseled in strategies for long term weight loss and maintenance: Keeping a food diary, exercise for 1 hour a day and eating breakfast everyday.     3 meals a day made up of the following:  Unlimited green vegetables, tomatoes, mushrooms, spaghetti squash, cauliflower, meat, poultry, seafood, eggs and hard  cheeses.   Milk and plain yogurt  Dressings, seasonings, condiments, etc should have less than 2 g sugars.   Beans (1-1.5 cups) or nuts (1/4 cup) can have 1 x a day.   1-2 servings of citrus fruits, berries, pineapple or melon a day (1/2 cup)  Avoid fried foods    No grains, rice, pasta, potatoes, bread, corn, peas, oatmeal, grits, tortillas, crackers, chips    No soda, sweet tea, juices or lemonade. All drinks should be 5 calories or less.     Www.dietdoctor.com for recipes. Moderate carb intake      Hurricane tips given.

## 2022-07-28 NOTE — PATIENT INSTRUCTIONS
PLEASE ARRIVE 15-20 min EARLY FOR YOUR APPOINTMENTS. This allows us to perform all of the services for your appointment in a timely fashion. Arriving late for your appointment not only decreases the time available for the doctor to see you, it also leads to delays for every other patient scheduled after you. Patients arriving after their appointment time may be asked to wait until all patients that arrived on time are seen, and/or there is another available slot for you to be worked in. When this is not possible, you may be asked to reschedule.   Thank you for your consideration in this matter.         Wegovy 2.4 mg once a week.         Decrease portions as soon as you start wegovy. Avoid fried, rich or greasy foods.      Some nausea in the first 2 weeks is not unusual.     If you get pain across the upper abdomen and around to your back, please call the office.     Exercise 30 min 3 times a week. Increase low impact activity as tolerated.  Avoid high impact activity, very heavy lifting or other exercise regimens that may cause discomfort.         Exercise 30 min 5 times a week. Gradually increase.     Patient counseled in strategies for long term weight loss and maintenance: Keeping a food diary, exercise for 1 hour a day and eating breakfast everyday.     3 meals a day made up of the following:  Unlimited green vegetables, tomatoes, mushrooms, spaghetti squash, cauliflower, meat, poultry, seafood, eggs and hard cheeses.   Milk and plain yogurt  Dressings, seasonings, condiments, etc should have less than 2 g sugars.   Beans (1-1.5 cups) or nuts (1/4 cup) can have 1 x a day.   1-2 servings of citrus fruits, berries, pineapple or melon a day (1/2 cup)  Avoid fried foods    No grains, rice, pasta, potatoes, bread, corn, peas, oatmeal, grits, tortillas, crackers, chips    No soda, sweet tea, juices or lemonade. All drinks should be 5 calories or less.     Www.dietdoctor.com for recipes. Moderate carb intake      Tips  for preparing for hurricane season:    If you are on weight loss medications, please take your medication with you in case of evacuation. Injectable medications should be transported with an ice pack if unopened or room temperature if you have started to use them.   Hurricane supplies do not necessarily need to be junk food or high in carbs or sugar. Shelf stable and healthy choices to include in your supplies could include:  Canned/packets of tuna or chicken  Apples, oranges, banana, pears  Beef or turkey jerky  Sugar free pudding  Pickle, olives, dill relish (mix with the tuna or chicken!)  Low sodium or no salt added canned vegetables  If you get bread, get lite bread (40 calories a slice)  0 sugar sports drinks  Water  String cheese will be okay for a few days without refrigeration or in an ice chest.   Peanut or other nut butter.   Parmesan crisps  Pork skins  Protein shakes (20-30g protein, less than 5 g sugar)  Protein bars (15 or more g protein, less than 5 g sugar)  Don't forget disposable forks, spoons, plates in your supplies  If evacuated, manage stress by taking walks, reading or meditating.   If eating out make better choices when possible.   Salads with a lean protein and limited dressing, cheese or other toppings  Grilled chicken sandwich or burger without the bun.   Skip the fries!  Order water or unsweetened tea instead of soda  Grocery stores with a deli, salad/food bar can be a good quick and affordable option to replace fast food

## 2022-07-29 ENCOUNTER — TELEPHONE (OUTPATIENT)
Dept: BARIATRICS | Facility: CLINIC | Age: 52
End: 2022-07-29
Payer: COMMERCIAL

## 2022-07-29 NOTE — TELEPHONE ENCOUNTER
----- Message from Lisa Hernández sent at 7/29/2022  1:06 PM CDT -----  Contact: self @ [phone]  Pt was seen on yesterday.  Pt says she needs to speak with someone concerning her prescription for semaglutide, weight loss, (WEGOVY) 2.4 mg/0.75 mL PnIj.  She says she needs to discuss what her pharmacist told her.  Pls call.

## 2022-08-01 NOTE — TELEPHONE ENCOUNTER
Pt and I discussed that if the discount program was up, we have exhausted other options. I would not rx her mounjaro, as I am only prescribing it to pts that only have diabetes.

## 2022-08-02 ENCOUNTER — PATIENT MESSAGE (OUTPATIENT)
Dept: BARIATRICS | Facility: CLINIC | Age: 52
End: 2022-08-02
Payer: COMMERCIAL

## 2022-08-04 ENCOUNTER — PATIENT MESSAGE (OUTPATIENT)
Dept: BARIATRICS | Facility: CLINIC | Age: 52
End: 2022-08-04
Payer: COMMERCIAL

## 2022-09-07 ENCOUNTER — PATIENT MESSAGE (OUTPATIENT)
Dept: BARIATRICS | Facility: CLINIC | Age: 52
End: 2022-09-07
Payer: COMMERCIAL

## 2022-10-06 ENCOUNTER — PATIENT MESSAGE (OUTPATIENT)
Dept: BARIATRICS | Facility: CLINIC | Age: 52
End: 2022-10-06
Payer: COMMERCIAL

## 2022-10-11 ENCOUNTER — TELEPHONE (OUTPATIENT)
Dept: BARIATRICS | Facility: CLINIC | Age: 52
End: 2022-10-11
Payer: COMMERCIAL

## 2022-10-11 NOTE — TELEPHONE ENCOUNTER
----- Message from Dk Paige MA sent at 10/11/2022 10:45 AM CDT -----  Contact: [phone]    Type:  RX Refill Request      Who Called: Pt      Refill or New Rx: New Rx      RX Name and Strength: Diethylpropion      Preferred Pharmacy with phone number:   [id number] [place] [address] [address] [facility] [place] [address] [address] [facility] [address]  Phone: [phone] Fax: [phone]     Local or Mail Order:      Would the patient rather a call back or a response via [facility]?      Best Call Back Number: [phone]       Additional Information:

## 2022-10-11 NOTE — TELEPHONE ENCOUNTER
Returned pt call in regard to Diethylpropion rf . Pt stated it is a waste to come back for f/u because she has not lost weight . Pt stated she would like  rf prior to coming into clinic fr f/u. Informed pt Diethylpropion is a controlled substance and it requires routine f/u with provider.

## 2022-10-13 NOTE — TELEPHONE ENCOUNTER
Phoned pt in regard 's note. Pt stated she is not doing that and is not interested in following up with Bariatric surgery.

## 2022-10-13 NOTE — TELEPHONE ENCOUNTER
Pt and I have discussed this on numerous occasions, that we are out of medication options.Unfortunately, the only thing that helped her was wegovy, and her insurance does not cover it. If she did not lose weight with the diethylpropion, I would agree with her that I don have further options to offer her. She may want to reconsider surgery work up.   Had seen Carmen in 2021.

## 2022-11-04 ENCOUNTER — PATIENT MESSAGE (OUTPATIENT)
Dept: BARIATRICS | Facility: CLINIC | Age: 52
End: 2022-11-04
Payer: COMMERCIAL

## 2022-12-07 ENCOUNTER — PATIENT MESSAGE (OUTPATIENT)
Dept: BARIATRICS | Facility: CLINIC | Age: 52
End: 2022-12-07
Payer: COMMERCIAL

## 2023-01-09 ENCOUNTER — PATIENT MESSAGE (OUTPATIENT)
Dept: BARIATRICS | Facility: CLINIC | Age: 53
End: 2023-01-09
Payer: COMMERCIAL

## 2023-02-09 ENCOUNTER — PATIENT MESSAGE (OUTPATIENT)
Dept: BARIATRICS | Facility: CLINIC | Age: 53
End: 2023-02-09
Payer: COMMERCIAL

## 2023-02-14 ENCOUNTER — PATIENT MESSAGE (OUTPATIENT)
Dept: BARIATRICS | Facility: CLINIC | Age: 53
End: 2023-02-14
Payer: COMMERCIAL

## 2023-02-22 ENCOUNTER — PATIENT MESSAGE (OUTPATIENT)
Dept: BARIATRICS | Facility: CLINIC | Age: 53
End: 2023-02-22
Payer: COMMERCIAL

## 2023-03-08 ENCOUNTER — PATIENT MESSAGE (OUTPATIENT)
Dept: BARIATRICS | Facility: CLINIC | Age: 53
End: 2023-03-08
Payer: COMMERCIAL

## 2023-03-14 ENCOUNTER — PATIENT MESSAGE (OUTPATIENT)
Dept: BARIATRICS | Facility: CLINIC | Age: 53
End: 2023-03-14
Payer: COMMERCIAL

## 2023-04-05 ENCOUNTER — PATIENT MESSAGE (OUTPATIENT)
Dept: BARIATRICS | Facility: CLINIC | Age: 53
End: 2023-04-05
Payer: COMMERCIAL

## 2023-04-11 ENCOUNTER — PATIENT MESSAGE (OUTPATIENT)
Dept: BARIATRICS | Facility: CLINIC | Age: 53
End: 2023-04-11
Payer: COMMERCIAL

## 2023-05-03 ENCOUNTER — PATIENT MESSAGE (OUTPATIENT)
Dept: BARIATRICS | Facility: CLINIC | Age: 53
End: 2023-05-03
Payer: COMMERCIAL

## 2023-05-09 ENCOUNTER — PATIENT MESSAGE (OUTPATIENT)
Dept: BARIATRICS | Facility: CLINIC | Age: 53
End: 2023-05-09
Payer: COMMERCIAL

## 2023-06-07 ENCOUNTER — PATIENT MESSAGE (OUTPATIENT)
Dept: BARIATRICS | Facility: CLINIC | Age: 53
End: 2023-06-07
Payer: COMMERCIAL

## 2023-06-13 ENCOUNTER — PATIENT MESSAGE (OUTPATIENT)
Dept: BARIATRICS | Facility: CLINIC | Age: 53
End: 2023-06-13
Payer: COMMERCIAL

## 2023-08-02 ENCOUNTER — PATIENT MESSAGE (OUTPATIENT)
Dept: BARIATRICS | Facility: CLINIC | Age: 53
End: 2023-08-02
Payer: COMMERCIAL

## 2023-09-06 ENCOUNTER — PATIENT MESSAGE (OUTPATIENT)
Dept: BARIATRICS | Facility: CLINIC | Age: 53
End: 2023-09-06
Payer: COMMERCIAL

## 2023-09-12 ENCOUNTER — PATIENT MESSAGE (OUTPATIENT)
Dept: BARIATRICS | Facility: CLINIC | Age: 53
End: 2023-09-12
Payer: COMMERCIAL

## 2023-10-04 ENCOUNTER — PATIENT MESSAGE (OUTPATIENT)
Dept: BARIATRICS | Facility: CLINIC | Age: 53
End: 2023-10-04
Payer: COMMERCIAL

## 2023-10-10 ENCOUNTER — PATIENT MESSAGE (OUTPATIENT)
Dept: BARIATRICS | Facility: CLINIC | Age: 53
End: 2023-10-10
Payer: COMMERCIAL

## 2023-11-14 ENCOUNTER — PATIENT MESSAGE (OUTPATIENT)
Dept: BARIATRICS | Facility: CLINIC | Age: 53
End: 2023-11-14
Payer: COMMERCIAL

## 2023-12-12 ENCOUNTER — PATIENT MESSAGE (OUTPATIENT)
Dept: BARIATRICS | Facility: CLINIC | Age: 53
End: 2023-12-12
Payer: COMMERCIAL

## 2023-12-13 ENCOUNTER — PATIENT MESSAGE (OUTPATIENT)
Dept: BARIATRICS | Facility: CLINIC | Age: 53
End: 2023-12-13
Payer: COMMERCIAL

## 2023-12-18 ENCOUNTER — PATIENT MESSAGE (OUTPATIENT)
Dept: BARIATRICS | Facility: CLINIC | Age: 53
End: 2023-12-18
Payer: COMMERCIAL

## 2024-01-09 ENCOUNTER — PATIENT MESSAGE (OUTPATIENT)
Dept: BARIATRICS | Facility: CLINIC | Age: 54
End: 2024-01-09
Payer: COMMERCIAL

## 2024-02-14 ENCOUNTER — PATIENT MESSAGE (OUTPATIENT)
Dept: BARIATRICS | Facility: CLINIC | Age: 54
End: 2024-02-14
Payer: COMMERCIAL

## 2024-02-20 ENCOUNTER — PATIENT MESSAGE (OUTPATIENT)
Dept: BARIATRICS | Facility: CLINIC | Age: 54
End: 2024-02-20
Payer: COMMERCIAL

## 2024-02-29 ENCOUNTER — PATIENT MESSAGE (OUTPATIENT)
Dept: BARIATRICS | Facility: CLINIC | Age: 54
End: 2024-02-29
Payer: COMMERCIAL

## 2024-03-06 ENCOUNTER — PATIENT MESSAGE (OUTPATIENT)
Dept: BARIATRICS | Facility: CLINIC | Age: 54
End: 2024-03-06
Payer: COMMERCIAL

## 2024-04-03 ENCOUNTER — PATIENT MESSAGE (OUTPATIENT)
Dept: BARIATRICS | Facility: CLINIC | Age: 54
End: 2024-04-03
Payer: COMMERCIAL

## 2024-04-09 ENCOUNTER — PATIENT MESSAGE (OUTPATIENT)
Dept: BARIATRICS | Facility: CLINIC | Age: 54
End: 2024-04-09
Payer: COMMERCIAL

## 2024-04-30 ENCOUNTER — DOCUMENTATION ONLY (OUTPATIENT)
Dept: BARIATRICS | Facility: CLINIC | Age: 54
End: 2024-04-30
Payer: COMMERCIAL

## 2024-04-30 NOTE — PROGRESS NOTES
Bariatric dashboard updated from note:  October 13, 2022  Albino Quiñones MA         10/13/22  4:49 PM  Note  Phoned pt in regard 's note. Pt stated she is not doing that and is not interested in following up with Bariatric surgery.

## 2024-09-30 ENCOUNTER — OFFICE VISIT (OUTPATIENT)
Dept: URGENT CARE | Facility: CLINIC | Age: 54
End: 2024-09-30
Payer: COMMERCIAL

## 2024-09-30 VITALS
SYSTOLIC BLOOD PRESSURE: 142 MMHG | RESPIRATION RATE: 19 BRPM | WEIGHT: 222 LBS | OXYGEN SATURATION: 98 % | HEIGHT: 63 IN | DIASTOLIC BLOOD PRESSURE: 73 MMHG | BODY MASS INDEX: 39.34 KG/M2 | HEART RATE: 88 BPM | TEMPERATURE: 98 F

## 2024-09-30 DIAGNOSIS — Z02.6 ENCOUNTER RELATED TO WORKER'S COMPENSATION CLAIM: Primary | ICD-10-CM

## 2024-09-30 DIAGNOSIS — S16.1XXA CERVICAL STRAIN, ACUTE, INITIAL ENCOUNTER: ICD-10-CM

## 2024-09-30 DIAGNOSIS — G44.319 ACUTE POST-TRAUMATIC HEADACHE, NOT INTRACTABLE: ICD-10-CM

## 2024-09-30 DIAGNOSIS — S50.812A ABRASION OF LEFT FOREARM, INITIAL ENCOUNTER: ICD-10-CM

## 2024-09-30 RX ORDER — BUTALBITAL, ACETAMINOPHEN AND CAFFEINE 50; 325; 40 MG/1; MG/1; MG/1
1 TABLET ORAL EVERY 4 HOURS PRN
COMMUNITY
Start: 2024-09-25 | End: 2024-10-02

## 2024-09-30 RX ORDER — HYDROCHLOROTHIAZIDE 25 MG/1
25 TABLET ORAL
COMMUNITY

## 2024-09-30 RX ORDER — MUPIROCIN 20 MG/G
OINTMENT TOPICAL
Qty: 22 G | Refills: 1 | Status: SHIPPED | OUTPATIENT
Start: 2024-09-30

## 2024-09-30 RX ORDER — LISINOPRIL 10 MG/1
4 TABLET ORAL DAILY
COMMUNITY
Start: 2024-09-25 | End: 2024-12-24

## 2024-09-30 RX ORDER — AMLODIPINE BESYLATE 10 MG/1
5 TABLET ORAL
COMMUNITY
Start: 2024-09-25 | End: 2024-12-24

## 2024-09-30 NOTE — LETTER
[facility] [address] [address]  Phone: [phone]  Fax: [phone]  [facility]: [phone]    Pt Name: Lizabeth Gomez  Injury Date: 09/19/2024   Employee ID: [id number] Date of First Treatment: 09/30/2024   Company: [employer]      Appointment Time: 11:15 AM Arrived: 10:35 AM   Provider: Frankie Moralez MD Time Out: 12:08 PM     Office Treatment:   1. Encounter related to worker's compensation claim    2. Acute post-traumatic headache, not intractable    3. Abrasion of left forearm, initial encounter    4. Cervical strain, acute, initial encounter          Patient Instructions: Attention not to aggravate affected area, Daily home exercises/warm soaks      Restrictions: Sit or stand as needed, Sit down work only     Return Appointment: 10/7/2024 at 9:30 AM    TERE

## 2024-09-30 NOTE — PROGRESS NOTES
Subjective:      Patient ID: Lizabeth Gomez is a 54 y.o. female.    Chief Complaint: Head Injury    Patient's place of employment - [employer]  Patient's job title - [employer]  Date of injury - 09/25/2024  Body part injured including left or right -  head  Injury Mechanism - fall  What they were doing when they got hurt - Pt tripped on stairs at work, fell  and head hit the railing.   What they did immediately after - ER  Pain scale right now - 8    PATIENT IS A 54-YEAR-OLD FEMALE WHO WAS GOING UPSTAIRS ON THE 19TH AND HAD A TRIP AND FALL FORWARD SUSTAINING ABRASIONS TO BILATERAL FOREARMS AND A DIRECT IMPACT TO THE HEAD ON THE LEFT SIDE.  VERY BRIEF LOSS OF CONSCIOUSNESS HOWEVER WAS FOGGY AND WENT TO THE EMERGENCY DEPARTMENT.  THERE SHE WAS EVALUATED WITH A CT SCAN OF THE HEAD AND CERVICAL SPINE WHICH WERE NEGATIVE.  SHE REPORTS SIGNIFICANT IMPROVEMENT HOWEVER STILL HAVING SOME SYMPTOMS OF HEADACHE ON THE LEFT SIDE AND OCCASIONAL SLOWNESS TO RESPOND BUT STATES THAT HER SPEECH AND THOUGHT PROCESSES ARE CLEARING.  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  SHE HAS PRESCRIPTIONS FOR PAIN AND MUSCLE RELAXER FOR ANY SORT OF NECK DISCOMFORT.  NEUROLOGICALLY INTACT, GCS 15, ALERT AND ORIENTED X4.  WILL HAVE HER RETURN IN 1 WEEK FOR ANTICIPATED CLEARANCE AT THAT TIME.    Head Injury   Associated symptoms include headaches. Pertinent negatives include no vomiting.     ROS  Constitution: Negative for chills, fatigue and fever.   HENT:  Negative for ear pain, sinus pain and sore throat.    Neck: Negative for neck pain and neck stiffness.   Cardiovascular:  Negative for chest pain, palpitations and sob on exertion.   Eyes:  Negative for eye pain and vision loss.   Respiratory:  Negative for cough, shortness of breath and asthma.    Gastrointestinal:  Negative for abdominal pain, nausea, vomiting and diarrhea.   Genitourinary:  Negative for dysuria, frequency and  hematuria.   Musculoskeletal:  Negative for pain, abnormal ROM of joint and back pain.   Skin:  Negative for rash and wound.   Allergic/Immunologic: Negative for seasonal allergies and asthma.   Neurological:  Positive for headaches. Negative for dizziness, light-headedness and altered mental status.   Psychiatric/Behavioral:  Negative for altered mental status and confusion.      Objective:     Physical Exam  Vitals and nursing note reviewed.   Constitutional:       General: She is not in acute distress.     Appearance: Normal appearance. She is well-developed. She is not ill-appearing, toxic-appearing or diaphoretic.   HENT:      Head: Normocephalic.      Jaw: No trismus.      Comments: MILD TENDERNESS TO PALPATION TO THE LEFT TEMPORAL REGION AT THE AREA OF IMPACT WITHOUT ABRASIONS LACERATIONS BRUISING OR SWELLING.     Right Ear: Hearing, tympanic membrane, ear canal and external ear normal.      Left Ear: Hearing, tympanic membrane, ear canal and external ear normal.      Nose: Nose normal. No nasal deformity, mucosal edema or rhinorrhea.      Right Sinus: No maxillary sinus tenderness or frontal sinus tenderness.      Left Sinus: No maxillary sinus tenderness or frontal sinus tenderness.      Mouth/Throat:      Dentition: Normal dentition.      Pharynx: Uvula midline. No posterior oropharyngeal erythema or uvula swelling.   Eyes:      General: Lids are normal. No scleral icterus.        Right eye: No discharge.         Left eye: No discharge.      Conjunctiva/sclera: Conjunctivae normal.      Comments: Sclera clear bilat   Neck:      Trachea: Trachea and phonation normal.   Cardiovascular:      Rate and Rhythm: Normal rate and regular rhythm.      Pulses: Normal pulses.      Heart sounds: Normal heart sounds.   Pulmonary:      Effort: Pulmonary effort is normal. No respiratory distress.      Breath sounds: Normal breath sounds.   Abdominal:      General: Bowel sounds are normal. There is no distension.       Palpations: Abdomen is soft. There is no mass or pulsatile mass.      Tenderness: There is no abdominal tenderness.   Musculoskeletal:         General: No deformity. Normal range of motion.      Cervical back: Full passive range of motion without pain, normal range of motion and neck supple.      Comments: NORMAL RANGE OF MOTION BILATERAL UPPER EXTREMITY    EXAMINATION OF THE CERVICAL SPINE SHOWS NO TENDERNESS TO PALPATION NO MIDLINE PAIN, NORMAL RANGE OF MOTION WITHOUT ANY NECK MUSCULATURE STIFFNESS.  NORMAL UPPER AND LOWER EXTREMITY STRENGTH.   Skin:     General: Skin is warm and dry.      Coloration: Skin is not pale.   Neurological:      General: No focal deficit present.      Mental Status: She is alert and oriented to person, place, and time. Mental status is at baseline.      Cranial Nerves: No cranial nerve deficit.      Sensory: No sensory deficit.      Motor: No weakness or abnormal muscle tone.      Coordination: Coordination normal.      Gait: Gait normal.      Comments: GCS 15, NORMAL MENTATION AND NORMAL SPEECH, BILATERAL UPPER AND LOWER EXTREMITY STRENGTH NORMAL.   Psychiatric:         Speech: Speech normal.         Behavior: Behavior normal. Behavior is cooperative.         Thought Content: Thought content normal.         Judgment: Judgment normal.         CT Cervical Spine Without Contrast    Result Date: 9/25/2024  EXAM: [facility] [place] CT CERVICAL SPINE WITHOUT CONTRAST TECHNIQUE: Multiple thin cut axial images were acquired through the cervical spine without contrast. Sagittal and coronal instructions were provided. Automatic exposure control was utilized to limit the radiation dose to the patient. Total DLP: mGy-cm HISTORY: Neck trauma, midline tenderness (Age 16-64y) COMPARISON: None FINDINGS: There is reversal of the cervical lordosis at the level of C5-6. The prevertebral soft tissues are unremarkable. The vertebral body heights are well-maintained with no evidence of acute fracture or listhesis. The  lateral masses of C1 and C2 are intact. There is moderate multilevel spondylosis of the cervical spine with multilevel marginal endplate osteophytes, facet hypertrophy, and uncovertebral spurring. There is moderate disc height loss of C5-6 and mild disc height loss of C4-5 and C6-7. There is no significant foraminal narrowing or spinal canal stenosis.    1.   No acute osseous findings. Refer to the body the report for degenerative findings. Electronically Signed By: Dirk Delarosa M.D. 9/25/2024 5:45 PM CDT    CT Head Without Contrast    Result Date: 9/25/2024  EXAM: [facility] [place] CT HEAD WITHOUT CONTRAST TECHNIQUE:  Axial images of the chest were obtained without IV contrast administration.  Coronal and sagittal reconstructions were provided. Radiation dose lowering technique, automated exposure control, was utilized for this exam. DLP: 1291.90 mGy.cm INDICATION:  54 years Female: Head trauma, repeat vomiting (Age 18-64y) COMPARISON: January 28, 2018 CT head without FINDINGS: No evidence of intracranial hemorrhage. Mild periventricular hypodense white matter changes, nonspecific finding, but consistent with chronic microvascular ischemic disease. There is moderate global loss of volume for patient's age. No evidence of hydrocephalus. The gray-white matter differentiation is well-maintained.   No abnormal extra-axial fluid collections. No hyperdense artery or vein. Atherosclerotic calcifications of the intracranial carotid arteries are noted. The orbits are within normal limits. No acute osseous findings involving the calvarium or visualized osseous structures. The visualized paranasal sinuses and mastoid air cells are well pneumatized.    1.   No CT evidence of an acute intracranial process. Refer to the body of the report for other nonacute findings. Electronically Signed By: Dirk Delarosa M.D. 9/25/2024 5:42 PM CDT       Assessment:      1. Encounter related to worker's compensation claim    2. Acute post-traumatic  headache, not intractable    3. Abrasion of left forearm, initial encounter    4. Cervical strain, acute, initial encounter      Plan:     PATIENT IS A 54-YEAR-OLD FEMALE WHO WAS GOING UPSTAIRS ON THE 19TH AND HAD A TRIP AND FALL FORWARD SUSTAINING ABRASIONS TO BILATERAL FOREARMS AND A DIRECT IMPACT TO THE HEAD ON THE LEFT SIDE.  VERY BRIEF LOSS OF CONSCIOUSNESS HOWEVER WAS FOGGY AND WENT TO THE EMERGENCY DEPARTMENT.  THERE SHE WAS EVALUATED WITH A CT SCAN OF THE HEAD AND CERVICAL SPINE WHICH WERE NEGATIVE.  SHE REPORTS SIGNIFICANT IMPROVEMENT HOWEVER STILL HAVING SOME SYMPTOMS OF HEADACHE ON THE LEFT SIDE AND OCCASIONAL SLOWNESS TO RESPOND BUT STATES THAT HER SPEECH AND THOUGHT PROCESSES ARE CLEARING.  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  SHE HAS PRESCRIPTIONS FOR PAIN AND MUSCLE RELAXER FOR ANY SORT OF NECK DISCOMFORT.  NEUROLOGICALLY INTACT, GCS 15, ALERT AND ORIENTED X4.  WILL HAVE HER RETURN IN 1 WEEK FOR ANTICIPATED CLEARANCE AT THAT TIME.     Patient Instructions: Attention not to aggravate affected area, Daily home exercises/warm soaks   Restrictions: Sit or stand as needed, Sit down work only  Follow up in about 1 week (around 10/7/2024).

## 2024-10-07 ENCOUNTER — OFFICE VISIT (OUTPATIENT)
Dept: URGENT CARE | Facility: CLINIC | Age: 54
End: 2024-10-07
Payer: COMMERCIAL

## 2024-10-07 DIAGNOSIS — S50.812D ABRASION OF LEFT FOREARM, SUBSEQUENT ENCOUNTER: ICD-10-CM

## 2024-10-07 DIAGNOSIS — G44.319 ACUTE POST-TRAUMATIC HEADACHE, NOT INTRACTABLE: ICD-10-CM

## 2024-10-07 DIAGNOSIS — Z02.6 ENCOUNTER RELATED TO WORKER'S COMPENSATION CLAIM: Primary | ICD-10-CM

## 2024-10-07 DIAGNOSIS — S16.1XXD CERVICAL MUSCLE STRAIN, SUBSEQUENT ENCOUNTER: ICD-10-CM

## 2024-10-07 PROCEDURE — 99214 OFFICE O/P EST MOD 30 MIN: CPT | Mod: S$GLB,,, | Performed by: EMERGENCY MEDICINE

## 2024-10-07 RX ORDER — NAPROXEN 500 MG/1
500 TABLET ORAL 2 TIMES DAILY WITH MEALS
Qty: 20 TABLET | Refills: 0 | Status: SHIPPED | OUTPATIENT
Start: 2024-10-07 | End: 2024-10-17

## 2024-10-07 RX ORDER — METHOCARBAMOL 500 MG/1
1000 TABLET, FILM COATED ORAL 3 TIMES DAILY
Qty: 30 TABLET | Refills: 0 | Status: SHIPPED | OUTPATIENT
Start: 2024-10-07 | End: 2024-10-12

## 2024-10-07 NOTE — PROGRESS NOTES
Subjective:      Patient ID: Lizabeth Gomez is a 54 y.o. female.    Chief Complaint: Head Injury (Doi 09/25/2024 Head, neck and back injury mauricio)    Patient's place of employment -  [employer]   Patient's job title -  III  Date of injury - 09/25/2024  Body part injured including left or right -  head, neck , back   Current work status per last visit -  light duty  Improved, same, or worse -  Pt states she is having less migraines and a shooting pain from her head to her mid back.   Pain Scale right now (1-10) -  8  MAURICIO    Patient reports some improvement in the frequency of the headaches and scalp pain has improved, however reporting some persistent neck pain paraspinous cervical and some radiation to the right upper thoracic region consistent with cervical strain and upper back strain.  Will give another week of light duty, added anti-inflammatory naproxen as well as muscle relaxant methocarbamol and have her return in 1 week.  She has no neurologic symptoms and CT scans of the head and neck are negative.  Will consider physical therapy at next visit if not significantly improved or resolved.  Return to clinic 1 week    Head Injury   Associated symptoms include headaches. Pertinent negatives include no vomiting.     ros  Constitution: Negative for chills, fatigue and fever.   HENT:  Negative for ear pain, sinus pain and sore throat.    Neck: Positive for neck pain and neck stiffness.   Cardiovascular:  Negative for chest pain, palpitations and sob on exertion.   Eyes:  Negative for eye pain and vision loss.   Respiratory:  Negative for cough, shortness of breath and asthma.    Gastrointestinal:  Negative for abdominal pain, nausea, vomiting and diarrhea.   Genitourinary:  Negative for dysuria, frequency and hematuria.   Musculoskeletal:  Positive for muscle ache. Negative for pain, abnormal ROM of joint and back pain.   Skin:  Negative for rash and wound.   Allergic/Immunologic: Negative for seasonal  allergies and asthma.   Neurological:  Positive for headaches. Negative for dizziness, light-headedness and altered mental status.   Psychiatric/Behavioral:  Negative for altered mental status and confusion.      Objective:     Physical Exam  Vitals and nursing note reviewed.   Constitutional:       General: She is not in acute distress.     Appearance: Normal appearance. She is well-developed. She is not ill-appearing, toxic-appearing or diaphoretic.   HENT:      Head: Normocephalic.      Jaw: No trismus.      Comments: MILD TENDERNESS TO PALPATION TO THE LEFT TEMPORAL REGION AT THE AREA OF IMPACT WITHOUT ABRASIONS LACERATIONS BRUISING OR SWELLING.     Right Ear: Hearing, tympanic membrane, ear canal and external ear normal.      Left Ear: Hearing, tympanic membrane, ear canal and external ear normal.      Nose: Nose normal. No nasal deformity, mucosal edema or rhinorrhea.      Right Sinus: No maxillary sinus tenderness or frontal sinus tenderness.      Left Sinus: No maxillary sinus tenderness or frontal sinus tenderness.      Mouth/Throat:      Dentition: Normal dentition.      Pharynx: Uvula midline. No posterior oropharyngeal erythema or uvula swelling.   Eyes:      General: Lids are normal. No scleral icterus.        Right eye: No discharge.         Left eye: No discharge.      Conjunctiva/sclera: Conjunctivae normal.      Comments: Sclera clear bilat   Neck:      Trachea: Trachea and phonation normal.   Cardiovascular:      Rate and Rhythm: Normal rate and regular rhythm.      Pulses: Normal pulses.      Heart sounds: Normal heart sounds.   Pulmonary:      Effort: Pulmonary effort is normal. No respiratory distress.      Breath sounds: Normal breath sounds.   Abdominal:      General: Bowel sounds are normal. There is no distension.      Palpations: Abdomen is soft. There is no mass or pulsatile mass.      Tenderness: There is no abdominal tenderness.   Musculoskeletal:         General: No deformity. Normal  range of motion.      Cervical back: Full passive range of motion without pain, normal range of motion and neck supple.      Comments: NORMAL RANGE OF MOTION BILATERAL UPPER EXTREMITY    Patient has no midline cervical or thoracic tenderness however some bilateral paraspinous cervical muscle soreness and stiffness with range of motion as well as right upper thoracic paraspinous musculature.  Potentially cervical strain of the muscles from head impact.  Bilateral upper and lower extremity strength is normal and sensation is normal.   Skin:     General: Skin is warm and dry.      Coloration: Skin is not pale.      Comments: Healing abrasions bilateral forearms no signs of infection   Neurological:      General: No focal deficit present.      Mental Status: She is alert and oriented to person, place, and time. Mental status is at baseline.      Cranial Nerves: No cranial nerve deficit.      Sensory: No sensory deficit.      Motor: No weakness or abnormal muscle tone.      Coordination: Coordination normal.      Gait: Gait normal.      Comments: GCS 15, NORMAL MENTATION AND NORMAL SPEECH, BILATERAL UPPER AND LOWER EXTREMITY STRENGTH NORMAL.   Psychiatric:         Speech: Speech normal.         Behavior: Behavior normal. Behavior is cooperative.         Thought Content: Thought content normal.         Judgment: Judgment normal.        Assessment:      1. Encounter related to worker's compensation claim    2. Acute post-traumatic headache, not intractable    3. Abrasion of left forearm, subsequent encounter    4. Cervical muscle strain, subsequent encounter      Plan:     Patient reports some improvement in the frequency of the headaches and scalp pain has improved, however reporting some persistent neck pain paraspinous cervical and some radiation to the right upper thoracic region consistent with cervical strain and upper back strain.  Will give another week of light duty, added anti-inflammatory naproxen as well as  muscle relaxant methocarbamol and have her return in 1 week.  She has no neurologic symptoms and CT scans of the head and neck are negative.  Will consider physical therapy at next visit if not significantly improved or resolved.  Return to clinic 1 week    Medications Ordered This Encounter   Medications    methocarbamoL (ROBAXIN) 500 MG Tab     Sig: Take 2 tablets (1,000 mg total) by mouth 3 (three) times daily. for 5 days     Dispense:  30 tablet     Refill:  0    naproxen (NAPROSYN) 500 MG tablet     Sig: Take 1 tablet (500 mg total) by mouth 2 (two) times daily with meals. for 10 days     Dispense:  20 tablet     Refill:  0     Patient Instructions: Attention not to aggravate affected area, Daily home exercises/warm soaks, Apply ice 24-48 hours then apply heat/warm soaks   Restrictions: Sit or stand as needed, Avoid frequent bending/lifting/twisting, Sit down work only  Follow up in about 1 week (around 10/14/2024).

## 2024-10-07 NOTE — LETTER
[facility] [address] [address]  Phone: [phone]  Fax: [phone]  [facility]: [id number]    Pt Name: Lizabeth Gomez  Injury Date: 09/19/2024   Employee ID:  Date of  Treatment: 10/07/2024   Company: [employer]      Appointment Time: 11:15 AM Arrived: 10:50 am   Provider: Frankie Moralez MD Time Out:11:30 am     Office Treatment:   1. Encounter related to worker's compensation claim    2. Acute post-traumatic headache, not intractable    3. Abrasion of left forearm, subsequent encounter    4. Cervical muscle strain, subsequent encounter      Medications Ordered This Encounter   Medications    methocarbamoL (ROBAXIN) 500 MG Tab    naproxen (NAPROSYN) 500 MG tablet      Patient Instructions: Attention not to aggravate affected area, Daily home exercises/warm soaks, Apply ice 24-48 hours then apply heat/warm soaks    Restrictions: Sit or stand as needed, Avoid frequent bending/lifting/twisting, Sit down work only     Return Appointment: 10/14/2024 at 9:30 am mauricio

## 2024-10-16 ENCOUNTER — OFFICE VISIT (OUTPATIENT)
Dept: URGENT CARE | Facility: CLINIC | Age: 54
End: 2024-10-16
Payer: COMMERCIAL

## 2024-10-16 VITALS
TEMPERATURE: 98 F | HEART RATE: 72 BPM | OXYGEN SATURATION: 100 % | SYSTOLIC BLOOD PRESSURE: 146 MMHG | RESPIRATION RATE: 18 BRPM | DIASTOLIC BLOOD PRESSURE: 108 MMHG | WEIGHT: 222 LBS | HEIGHT: 63 IN | BODY MASS INDEX: 39.34 KG/M2

## 2024-10-16 DIAGNOSIS — Z02.6 ENCOUNTER RELATED TO WORKER'S COMPENSATION CLAIM: ICD-10-CM

## 2024-10-16 DIAGNOSIS — G44.319 ACUTE POST-TRAUMATIC HEADACHE, NOT INTRACTABLE: Primary | ICD-10-CM

## 2024-10-16 DIAGNOSIS — M54.2 CERVICALGIA: ICD-10-CM

## 2024-10-16 DIAGNOSIS — S39.012D ACUTE MYOFASCIAL STRAIN OF LUMBAR REGION, SUBSEQUENT ENCOUNTER: ICD-10-CM

## 2024-10-16 PROCEDURE — 99215 OFFICE O/P EST HI 40 MIN: CPT | Mod: S$GLB,,, | Performed by: STUDENT IN AN ORGANIZED HEALTH CARE EDUCATION/TRAINING PROGRAM

## 2024-10-16 NOTE — PROGRESS NOTES
Subjective:      Patient ID: Lizabeth Gomez is a 54 y.o. female.    Chief Complaint: Injury (DOI 09/25/2024 Head, neck, back ZORAN)    Patient's place of employment -  [employer]   Patient's job title -  III  Date of injury - 09/25/2024  Body part injured including left or right -  head, neck , back   Current work status per last visit -  light duty  Improved, same, or worse -  . Head improved with medicine, Back and neck pt states she still has a shooting pain; and is unable to take the prescribe medicine due to drowsy and having to work.    Pain Scale right now (1-10) -  Head 7.5/10 Back and Neck 9.5/10  ZORAN      See MA note above. Begin MD note:  Ms. Gomez is following up on headaches, neck, and lower back pain after a trip and fall approx 1 month ago. She says she fell to her left and twisted her back hitting the left side of her head on an iron metal [place]. Seen at [facility] ED on 9/25 and diagnosed with a concussion .This is her 3rd Occupational Health follow up visit.     She states she is experiencing shooting pain from the neck to the lower back and feels like a spasm shooting down her spine. She gets stiffness across the bilateral lumbar region. She is taking naproxen 500mg and methocarbamol 100mg once daily in the evening which helps to relieve her symptoms temporarily, but she does not take during the day due to the severe drowsiness and grogginess that occurs after taking the medications. She is requesting a day off work so she can take her medication during the day to experience more relief. No other reported complaints or concerns at this time.       Injury      ROS  Objective:     Physical Exam  Vitals and nursing note reviewed.   Constitutional:       General: She is not in acute distress.     Appearance: She is not ill-appearing.   HENT:      Head: Normocephalic.   Eyes:      Conjunctiva/sclera: Conjunctivae normal.   Neck:      Comments: Left trapezius muscle spasms.  Limited range of  motion with rotation to the right due to left neck tightness and pain.  No pain reported with neck flexion or neck extension.  Pulmonary:      Effort: No respiratory distress.   Musculoskeletal:      Cervical back: Pain with movement (left neck), spinous process tenderness (distal cervical spine) and muscular tenderness (left trapezius muscle and left cervical paraspinal muscles) present. Decreased range of motion.      Comments: Decreased range of motion with lumbar flexion, rotation to the right, and side bending to the right due to reported left lower back pain and tightness.   Skin:     General: Skin is warm and dry.   Neurological:      Mental Status: She is alert and oriented to person, place, and time.   Psychiatric:         Attention and Perception: Attention normal.         Mood and Affect: Mood normal.         Behavior: Behavior normal.        Assessment:      1. Acute post-traumatic headache, not intractable    2. Encounter related to worker's compensation claim    3. Cervicalgia    4. Acute myofascial strain of lumbar region, subsequent encounter      Plan:     Patient and I discussed her ongoing headaches, neck pain, and lower back pain. Reviewed post concussive syndrome, cervicalgia, and back strain. Recommended PT for treatment and evaluation of these current diagnoses for comprehensive evaluation and treatment.  We also discussed how to optimize the patient's current regimen so that she is better able to tolerate. We discussed the methocarbamol causing the drowsiness she is experiencing and much less likely to be from naproxen.I have recommended that she take 1 of the methocarbamol 500 mg tablets at bedtime and begin taking the naproxen every 12 hours daily with food.  It is okay for her to take the methocarbamol on off days and if not driving.  Medication precautions and adverse effects were discussed.  She has also been allowed one day off work to optimize her medication regimen and monitor for  drowsiness response.  Follow-up visit in 2 weeks and patient has requested to be seen on Wednesdays rather than Mondays due to work related conflicts.  Okay to return to clinic sooner if needed.  Patient voiced understanding and agreement with the plan of care and did not have any questions or concerns prior to completion of the visit.       Patient Instructions: PT to be scheduled once authorized   Restrictions: Sit down work only, Home today (Resume work with restrictions on 10/18/2024)  Follow up in about 2 weeks (around 10/30/2024).    I spent a total of 30 minutes on the day of the visit. This includes face to face time and non-face to face time preparing to see the patient (eg, review of tests, prior records/notes), obtaining and/or reviewing separately obtained history, documenting clinical information in the electronic or other health record, independently interpreting results and communicating results to the patient.

## 2024-10-16 NOTE — LETTER
[facility] [address] [address]  Phone: [phone]  Fax: [phone]  [facility]: [email or web address]    Pt Name: Lizabeth Gomez  Injury Date: 09/19/2024   Employee ID:  Date of Treatment: 10/16/2024   Company: [employer]      Appointment Time: 10:15 AM Arrived: 10:35 am    Provider: Angelika Yost MD Time Out:11:40 am     Office Treatment:   1. Acute post-traumatic headache, not intractable    2. Encounter related to worker's compensation claim    3. Cervicalgia    4. Acute myofascial strain of lumbar region, subsequent encounter          Patient Instructions: PT to be scheduled once authorized    Restrictions: Sit down work only, Home today (Resume work with restrictions on 10/18/2024)     Return Appointment: 10/30/2024 at 10:00 am mauricio

## 2024-10-28 ENCOUNTER — CLINICAL SUPPORT (OUTPATIENT)
Dept: REHABILITATION | Facility: OTHER | Age: 54
End: 2024-10-28
Payer: COMMERCIAL

## 2024-10-28 DIAGNOSIS — M25.612 DECREASED RANGE OF MOTION OF LEFT SHOULDER: Primary | ICD-10-CM

## 2024-10-28 DIAGNOSIS — M62.81 MUSCLE WEAKNESS: ICD-10-CM

## 2024-10-28 DIAGNOSIS — M54.42 ACUTE LEFT-SIDED LOW BACK PAIN WITH LEFT-SIDED SCIATICA: ICD-10-CM

## 2024-10-28 DIAGNOSIS — M54.2 ACUTE NECK PAIN: ICD-10-CM

## 2024-10-28 PROCEDURE — 97163 PT EVAL HIGH COMPLEX 45 MIN: CPT | Mod: PN

## 2024-10-28 PROCEDURE — 97112 NEUROMUSCULAR REEDUCATION: CPT | Mod: PN

## 2024-10-29 PROBLEM — M25.612 DECREASED RANGE OF MOTION OF LEFT SHOULDER: Status: ACTIVE | Noted: 2024-10-29

## 2024-10-29 PROBLEM — M54.42 LEFT-SIDED LOW BACK PAIN WITH SCIATICA: Status: ACTIVE | Noted: 2024-10-29

## 2024-10-29 PROBLEM — M62.81 MUSCLE WEAKNESS: Status: ACTIVE | Noted: 2024-10-29

## 2024-10-29 PROBLEM — M54.2 ACUTE NECK PAIN: Status: ACTIVE | Noted: 2024-10-29

## 2024-10-30 ENCOUNTER — OFFICE VISIT (OUTPATIENT)
Dept: URGENT CARE | Facility: CLINIC | Age: 54
End: 2024-10-30
Payer: COMMERCIAL

## 2024-10-30 VITALS — HEIGHT: 63 IN | WEIGHT: 222 LBS | BODY MASS INDEX: 39.34 KG/M2

## 2024-10-30 DIAGNOSIS — Z02.6 ENCOUNTER RELATED TO WORKER'S COMPENSATION CLAIM: Primary | ICD-10-CM

## 2024-11-04 NOTE — PROGRESS NOTES
"[facility]   Physical Therapy Treatment Note     Name: [facility]izabeth [facility]STINA [facility] Number: [id number]    Therapy Diagnosis:   Encounter Diagnoses   Name Primary?    Decreased range of motion of left shoulder Yes    Acute neck pain     Muscle weakness     Acute left-sided low back pain with left-sided sciatica      Physician: Angelika Yost MD    Visit Date: 11/5/2024    Physician Orders: PT Eval and Treat   Medical Diagnosis from Referral:  Z02.6 (ICD-10-CM) - Encounter related to worker's compensation claim   M54.2 (ICD-10-CM) - Cervicalgia   G44.319 (ICD-10-CM) - Acute post-traumatic headache, not intractable    S39.012D (ICD-10-CM) - Acute myofascial strain of lumbar region, subsequent encounter      Evaluation Date: 10/28/2024  Authorization Period Expiration: 10/16/2024 - 10/16/2025  Plan of Care Expiration: 12/23/2024  Progress Note Due: 11/28/2024    Visit #/Visits authorized: 1/ 9  (# of No Show Appts 00 / Number of Cancelled Appts 00)    PTA Visit #: 1/ 5    Foto  Date  Score    #1/3 10/28/2024 43%   #2/3       #3/3            Precautions: Standard    Time In: ***  Time Out: ***  Total Billable Time: *** minutes    Occupation/job title:  [employer]  Job demands: forensic instructor, implemented programs: walking, standing for meeting, working at a computer, teaching, went out on crime scenes, bending, picking up evidence  Current work restrictions: Sit down work only    SUBJECTIVE   Patient reports: ***.    She {Actions; was/was not:55757} compliant with home exercise program.  Response to previous treatment: ***  Functional change: ***    Pain: {0-10:20507::"0"}/10  Location: {RIGHT/LEFT/BILATERAL:43072} {LOCATION ON BODY:01922}     OBJECTIVE     All Objective info from 10/28/2024:     Functional Job Specific Testing:   Job Specific Task Job Demands Current Ability   1. Teaching Standing, sitting, computer work Limited, Increased pain    2. Going to " Crime Scenes Walking, bending, lifting Not fully able to participate   3. Leading meeting Standing, sitting Limited, Increased pain       Postural examination in standing:  - increased lumbar lordosis  - forward head  - forward shoulders  - degreased thoracic kyphosis  - protracted scapulae     Postural examination in sitting:   - decreased  lumbar lordosis  - forward head  - forward shoulders         Cervical AROM     Flexion (60) 2 degrees   Extension (70) 30 degrees   Right rotation (80) 58 degrees   Left rotation (80) 38 degrees   Right side bending (45) 10 degrees   Left side bending (45) 32 degrees         Cervical Special Tests     Compression Increased pain in left    Distraction positive   Alar Ligament Stress Test: negative   Sharp-Brielle Test negative   Spurling's:    positive         UE MMT R L   C3 Cervical side bend 5/5 3/5*   C4 Shoulder Shrug 4/5  4/5*   Shoulder flexion 5/5 3-/5   Shoulder abduction 3/5* 3-/5   Shoulder internal rotation  5/5 5/5   Shoulder external rotation  5/5 3/5*   C5 Elbow flexion 5/5 4/5*   C7 Elbow extension 5/5 5/5   C6 Wrist extension 5/5 5/5   Wrist flexion 5/5 5/5   Scapular protraction NT NT   Mid trapezius  NT NT   Lower trapezius  NT NT      NT- not tested secondary limited by pain with positioning     *- left shoulder pain        Joint Mobility                                                                       right                                                     left  Shoulder flexion                                                                   full                                               55/70 degrees   Shoulder abduction                                                              full                                               45/45 degrees   Shoulder external rotation st side                               within normal limits                                  45 degrees   Shoulder external rotation at 90 degrees                          95  degrees                at 45 degrees, 45 degrees   Shoulder internal rotation                                                  70 degrees                                        15 degrees         Lumbar active range of motion in standing is:  Flexion 50 degrees    Extension 30 degrees    Left side bending 25 degrees    Right side bending 18 degrees    Left rotation Decreased 50%   Right rotation Decreased 50%      Pain/pulling in middle back with side bending     Lumbar Special tests     Straight Leg Raise Unable to test   Cross Straight Leg Raise  Unable to test   WINIFRED Bilateral hip tightness   Prone Instability Test Unable to test         MMT R L   Hip flexion 3+/5 4+/5   Hip abduction NT NT   Hip extension NT NT   Hip external rotation  NT NT   Hip internal rotation  NT NT   Knee extension 5/5 5/5**   Knee flexion 5/5 5/5**   Ankle dorsiflexion 5/5 5/5   Ankle plantar flexion 5/5 5/5   Ankle inversion 5/5 5/5   Ankle eversion 5/5 5/5      **- pain in left low back     Flexibility testing:  - hamstrings:              bilateral: within normal limits   - gastrocnemius:        right: within normal limits, left: tight, left: 5 degrees    - piriformis:                 unable to test today secondary pain  - quadriceps:              unable to test today secondary pain  - hip flexors:               unable to test today secondary pain  - hip adductors:          bilateral: within functional limits   - iliotibial band :          unable to test today        Sensation:  - Light Touch: intact  - Saddle: intact      Reflexes:   - Knee Jerk: 2+ bilaterally        Palpation: tender to palpation in suboccipital area, left first rib     TREATMENT       Patient received therapeutic exercises for *** minutes for improved strength, endurance, ROM, and flexibility including:  []      Patient received manual therapeutic technique for *** minutes for improved soft tissue and joint mobility including:        Patient received neuromuscular  "reeducation for *** minutes for improved balance, coordination, kinesthetic, sense, proprioception, and posture including:        Patient received therapeutic activities for *** minutes for improved tolerance to functional activities including:        PATIENT EDUCATION AND HOME EXERCISES     Home Exercises Provided and Patient Education Provided     Education provided:   - Exercise form and rationale  - Continuance of HEP    Written Home Exercises Provided: {Blank single:62549::"yes","Patient instructed to cont prior HEP"}. Exercises were reviewed and Lizabeth was able to demonstrate them prior to the end of the session.  Lizabeth demonstrated {Desc; good/fair/poor:71261} understanding of the education provided. See EMR under Patient Instructions for exercises provided during therapy sessions    ASSESSMENT       Lizabeth {IS/IS NOT:10103} progressing well towards her goals.   Patient prognosis is {REHAB PROGNOSIS OHS:12788}.     Patient will continue to benefit from skilled outpatient physical therapy to address the deficits listed in the problem list box on initial evaluation, provide patient/family education and to maximize patient's level of independence in the home and community environment.     Patient's spiritual, cultural and educational needs considered and patient agreeable to plan of care and goals.    Anticipated Barriers for therapy: self-limiting behaviors due to pain, concussion symptoms    Goals:    (In Progress)  Short Terms Goals: 3 weeks     Goal  Progress  Date    (1)  Patient will be I with HOME EXERCISE PROGRAM  Initial, Not Met     (2)  Patient will increase cervical flexion to 35 degrees, cervical extension to 45 degrees, cervical rotation to 60 degrees.  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  Initial, Not Met          PLAN   Plan of care Certification: 10/28/2024 to 12/2/2024.     Outpatient Physical Therapy 2 times weekly for 5 weeks to include the following interventions: Cervical/Lumbar Traction, Electrical Stimulation re-eval, dry needling, Gait Training, Manual Therapy, Moist Heat/ Ice, Neuromuscular Re-ed, Patient Education, Self Care, Therapeutic Activities, and Therapeutic Exercise.      Upon discharge from further skilled PT intervention it will determined if the need for a work conditioning program or Functional Capacity Evaluation is required to allow the injured worker to return to work with full potential achieved, continued improvement with body mechanics with advanced functional activities, and further minimize future work-related injuries.       Fabiana Wynne III, PTA

## 2024-11-05 ENCOUNTER — OFFICE VISIT (OUTPATIENT)
Dept: URGENT CARE | Facility: CLINIC | Age: 54
End: 2024-11-05
Payer: COMMERCIAL

## 2024-11-05 ENCOUNTER — CLINICAL SUPPORT (OUTPATIENT)
Dept: REHABILITATION | Facility: OTHER | Age: 54
End: 2024-11-05
Payer: COMMERCIAL

## 2024-11-05 VITALS
RESPIRATION RATE: 20 BRPM | OXYGEN SATURATION: 99 % | WEIGHT: 220.44 LBS | HEIGHT: 63 IN | HEART RATE: 74 BPM | TEMPERATURE: 98 F | DIASTOLIC BLOOD PRESSURE: 76 MMHG | SYSTOLIC BLOOD PRESSURE: 118 MMHG | BODY MASS INDEX: 39.06 KG/M2

## 2024-11-05 DIAGNOSIS — Z02.6 ENCOUNTER RELATED TO WORKER'S COMPENSATION CLAIM: ICD-10-CM

## 2024-11-05 DIAGNOSIS — M54.42 ACUTE LEFT-SIDED LOW BACK PAIN WITH LEFT-SIDED SCIATICA: ICD-10-CM

## 2024-11-05 DIAGNOSIS — M54.2 CERVICALGIA: ICD-10-CM

## 2024-11-05 DIAGNOSIS — M54.2 ACUTE NECK PAIN: ICD-10-CM

## 2024-11-05 DIAGNOSIS — S39.012D ACUTE MYOFASCIAL STRAIN OF LUMBAR REGION, SUBSEQUENT ENCOUNTER: ICD-10-CM

## 2024-11-05 DIAGNOSIS — M62.81 MUSCLE WEAKNESS: ICD-10-CM

## 2024-11-05 DIAGNOSIS — G44.319 ACUTE POST-TRAUMATIC HEADACHE, NOT INTRACTABLE: Primary | ICD-10-CM

## 2024-11-05 DIAGNOSIS — M25.612 DECREASED RANGE OF MOTION OF LEFT SHOULDER: Primary | ICD-10-CM

## 2024-11-05 PROCEDURE — 97140 MANUAL THERAPY 1/> REGIONS: CPT | Mod: PN

## 2024-11-05 PROCEDURE — 97110 THERAPEUTIC EXERCISES: CPT | Mod: PN

## 2024-11-05 PROCEDURE — 99213 OFFICE O/P EST LOW 20 MIN: CPT | Mod: S$GLB,,, | Performed by: PHYSICIAN ASSISTANT

## 2024-11-05 PROCEDURE — 97112 NEUROMUSCULAR REEDUCATION: CPT | Mod: PN

## 2024-11-05 RX ORDER — METHOCARBAMOL 500 MG/1
500 TABLET, FILM COATED ORAL 2 TIMES DAILY PRN
Qty: 30 TABLET | Refills: 0 | Status: SHIPPED | OUTPATIENT
Start: 2024-11-05 | End: 2024-11-20

## 2024-11-05 RX ORDER — NAPROXEN 500 MG/1
500 TABLET ORAL 2 TIMES DAILY PRN
Qty: 30 TABLET | Refills: 0 | Status: SHIPPED | OUTPATIENT
Start: 2024-11-05 | End: 2024-11-20

## 2024-11-05 NOTE — PROGRESS NOTES
Subjective:      Patient ID: Lizabeth Gomez is a 54 y.o. female.    Chief Complaint: Headache (HEAD, NECK, BACK, LT SHOULDER)    Ms. Gomez presents for follow up of injuries after a fall, DOI 9/19/24.  She is a  with [employer].  She reports she has had a little improvement in her pain.  She is having headaches approximately every 3 days that last for a couple hours and are in the occipital area.  She has associated light sensitivity.  Once she lays down & takes naproxen, the headache typically resolves.  She continues to have neck & LBP, mostly on the left side.  She reports she occasionally has paresthesias in the forearm area of the left arm.  The pain back intermittently shoots from the trapezius area to the lower back.  She denies any radiating arm pain, weakness, saddle anesthesia or B/B dysfunction.  She is taking robaxin at night and naproxen during the day with some relief.  She has started PT & went to her second session this morning.  She is working at light duty with no issue.      This patient is new to me.  I have reviewed all previous notes & imaging in the chart that pertain to this injury.  See MA note below.  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 Pertinent negatives include no coughing, ear pain, eye redness, fever, numbness, seizures or vomiting.       Constitution: Negative for activity change, appetite change and fever.   HENT:  Negative for ear pain, ear discharge, mouth sores and congestion.    Neck: Positive for neck pain. Negative for painful lymph nodes.   Eyes:  Negative for eye discharge and eye redness.   Respiratory:  Negative for cough.     Gastrointestinal:  Negative for vomiting and diarrhea.   Musculoskeletal:  Positive for pain, joint pain, back pain and muscle ache.   Skin:  Negative for rash and hives.   Allergic/Immunologic: Negative for hives and sneezing.   Neurological:  Positive for headaches and tingling. Negative for loss of consciousness, numbness and seizures.   Hematologic/Lymphatic: Negative for swollen lymph nodes.     Objective:     Physical Exam  Vitals and nursing note reviewed.   Constitutional:       General: She is not in acute distress.     Appearance: Normal appearance. She is not ill-appearing.   HENT:      Head: Normocephalic and atraumatic.      Right Ear: Tympanic membrane, ear canal and external ear normal. There is no impacted cerumen.      Left Ear: Tympanic membrane, ear canal and external ear normal. There is no impacted cerumen.      Nose: Nose normal. No congestion or rhinorrhea.      Mouth/Throat:      Mouth: Mucous membranes are moist.      Pharynx: No oropharyngeal exudate or posterior oropharyngeal erythema.   Eyes:      General: No visual field deficit.     Extraocular Movements: Extraocular movements intact.      Conjunctiva/sclera: Conjunctivae normal.      Pupils: Pupils are equal, round, and reactive to light.   Cardiovascular:      Rate and Rhythm: Normal rate and regular rhythm.      Pulses: Normal pulses.      Heart sounds: Normal heart sounds.   Pulmonary:      Effort: Pulmonary effort is normal.      Breath sounds: Normal breath sounds.   Abdominal:      General: Bowel sounds are normal.      Palpations: Abdomen is soft.   Musculoskeletal:      Cervical back: Tenderness present. No bony tenderness. Decreased range of motion.      Thoracic back: Normal.      Lumbar back: Tenderness present. Decreased range of motion. Negative right straight leg raise test and negative left straight leg raise test.        Back:       Comments: No step-offs appreciated.  No midline Cspine TTP.  Left trapezius TTP &  spasm.  BUE 5/5 deltoid, tricep, bicep, WE, WF, HG.  [place] neg bilaterally.  FROM with flexion, extension.  Dec ROM with right lateral rotation of the neck.  Patient reports pain & stiffness with full flexion.    TTP across lumbar spine & left paraspinal musculature.  BLE 5/5 HF, KF, KE, DF, PF, EHL.  Sensation intact.   BLE DTR 2+ & symmetric.  Dec ROM with flexion, extension and side bending bilaterally.   SLR neg bilaterally.     Skin:     General: Skin is warm.      Capillary Refill: Capillary refill takes less than 2 seconds.   Neurological:      General: No focal deficit present.      Mental Status: She is alert and oriented to person, place, and time.      GCS: GCS eye subscore is 4. GCS verbal subscore is 5. GCS motor subscore is 6.      Cranial Nerves: Cranial nerves 2-12 are intact. No cranial nerve deficit, dysarthria or facial asymmetry.      Sensory: Sensation is intact. No sensory deficit.      Motor: Motor function is intact. No weakness, tremor, atrophy, abnormal muscle tone, seizure activity or pronator drift.      Coordination: Coordination is intact. Coordination normal. Rapid alternating movements normal.      Gait: Gait is intact. Gait normal.   Psychiatric:         Mood and Affect: Mood normal.         Behavior: Behavior normal.         Thought Content: Thought content normal.         Judgment: Judgment normal.          Assessment:      1. Acute post-traumatic headache, not intractable    2. Cervicalgia    3. Acute myofascial strain of lumbar region, subsequent encounter    4. Encounter related to worker's compensation claim      Plan:     Rx refilled.  I discussed precautions of no driving while taking this medication & do not take within 8 hours of the work shift, as the medication may cause drowsiness.  She will continue PT and continue current work restrictions.  Follow up in 3 weeks to allow time for improvement with PT.    Medications Ordered This Encounter   Medications     methocarbamoL (ROBAXIN) 500 MG Tab     Sig: Take 1 tablet (500 mg total) by mouth 2 (two) times daily as needed (spasm).     Dispense:  30 tablet     Refill:  0    naproxen (NAPROSYN) 500 MG tablet     Sig: Take 1 tablet (500 mg total) by mouth 2 (two) times daily as needed (pain).     Dispense:  30 tablet     Refill:  0     Patient Instructions: Attention not to aggravate affected area, Continue Physical Therapy   Restrictions: Sit down work only  Follow up in about 3 weeks (around 11/26/2024).

## 2024-11-05 NOTE — LETTER
[facility] [address] [address]  Phone: [phone]  Fax: [phone]  [facility]: [phone]    Pt Name: Lizabeth Gomez  Injury Date: 09/19/2024   Employee ID: [id number] Date of Treatment: 11/05/2024   Company: [employer]      Appointment Time: 10:15 AM Arrived: 10:50 AM   Provider: Elif Cantrell PA-C Time Out: 12:45 PM     Office Treatment:   1. Acute post-traumatic headache, not intractable    2. Cervicalgia    3. Acute myofascial strain of lumbar region, subsequent encounter    4. Encounter related to worker's compensation claim      Medications Ordered This Encounter   Medications    methocarbamoL (ROBAXIN) 500 MG Tab    naproxen (NAPROSYN) 500 MG tablet      Patient Instructions: Attention not to aggravate affected area, Continue Physical Therapy      Restrictions: Sit down work only     Return Appointment: 11/26/2024 at 10:00 AM    TERE

## 2024-11-05 NOTE — PROGRESS NOTES
[facility]   Physical Therapy Treatment Note      Name: Lizabeth CRISTINA [facility] Number: [id number]    Therapy Diagnosis:   Encounter Diagnoses   Name Primary?    Decreased range of motion of left shoulder Yes    Acute neck pain     Muscle weakness     Acute left-sided low back pain with left-sided sciatica      Physician: Angelika Yost MD    Visit Date: 11/5/2024    Physician Orders: PT Eval and Treat   Medical Diagnosis from Referral:  Z02.6 (ICD-10-CM) - Encounter related to worker's compensation claim   M54.2 (ICD-10-CM) - Cervicalgia   G44.319 (ICD-10-CM) - Acute post-traumatic headache, not intractable    S39.012D (ICD-10-CM) - Acute myofascial strain of lumbar region, subsequent encounter      Evaluation Date: 10/28/2024  Authorization Period Expiration:   Plan of Care Expiration: 12/23/2024  Visit # / Visits authorized: 1/ 10      Foto  Date  Score    #1/3 10/28/2024 43%   #2/3       #3/3          Time In: 0800  Time Out: 0855  Total Appointment Time (timed & untimed codes): 55 minutes     Precautions: Standard       Visit #/Visits authorized: 1/9 (+evaluation)   (# of No Show Appts 0/Number of Cancelled Appts 0)    Occupation/job title:  [employer] [place]  Job demands: forensic instructor, implemented programs: walking, standing for meeting, working at a computer, teaching, 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 Reports increased pain and stiffness at the start of the session.     She was compliant with home exercise program.  Response to previous treatment: increased pain  Function: no change noted    Pain: 8.5/10  Location: left back , neck , and shoulder     Objective      Objective Measures updated at progress  "report unless specified.     Treatment     Lizabeth received the treatments listed below:      therapeutic exercises to develop strength, ROM, flexibility, and posture for 15 minutes including:  [x] Pectoralis stretch on towel roll with diaphragmatic breathing x 3'  [x] Shoulder pendulums: flexion/extension, side to side, circles (clockwise/counter clockwise) x 20 each  [x]+shoulder external rotation with dowel x 3'  [x]+upper trapezius stretch 3 x 30"  [x]+levator scapula stretch 3 x 30"    manual therapy techniques: Joint mobilizations, Myofacial release, Soft tissue Mobilization, and Friction Massage were applied to the: left upper shoulder, neck for 10 minutes, including:  [x]Soft tissue mobilization and Instrument Assisted Soft Tissue Mobilization left upper trapezius, left levator scapula, left supraspinatus, left scalenes    neuromuscular re-education activities to improve: Posture and muscle activation, muscle sequencing, and muscle strengthening for 30 minutes. The following activities were included:  [x]+scapular retractions on towel roll x 20 repetitions  [x]+chin retractions on towel roll x 10 repetitions   [x]+chin retractions with rotation x 10 repetitions     [x]+transverse abdominus bracing x 15 repetitions   [x]+posterior pelvic tilts x 15 repetitions   [x]+hook lying 3 way clams x 20 repetitions     [x]+table top shoulder flexion x 10 repetitions     [x]+dorsal scapular nerve glides (shoulder shrug with nodding, with side bending)    therapeutic activities to improve functional performance for 00  minutes, including:        Patient Education and Home Exercises       Education provided:   Supine chin tucks  Supine cervical rotations  Transverse abdominus bracing  Posterior pelvic tilts   Upper trapezius stretch  Levator scapula stretch      Home Exercises Provided: Patient instructed to cont prior home exercise program. Exercises were reviewed and Lizabeth was able to demonstrate them prior to the end " of the session.  Lizabeth demonstrated good  understanding of the education provided. See Electronic Medical Record  under Patient Instructions for exercises provided during therapy sessions    Assessment     Pain continues to limit patient. Attempted bridging, but unable to perform due to pain. Therapist discussed benefits of movement for pain reduction. Added exercise and stretches to home exercise program today. Great response to manual therapy. Patient tearful with reduction in pain levels. May be a candidate for dry needling. Increased tension and tender to palpation in left upper traps, scalenes, supraspinatus, and levator scapula.    The patient's current job specific task deficits include the following:  [employer]    Lizabeth Is is making slow progress towards meeting her goals.     Patient prognosis is: Guarded.   Rehab potential is:     Pt will continue to benefit from skilled Physical Therapy interventions in order to address the deficits listed in the problem list box on initial evaluation, provide education, and to address the musculoskeletal limitations and work-related functional deficits for their job as a  [employer], [employer].    Pt's spiritual, cultural and educational needs considered and pt agreeable to plan of care and goals.     Anticipated barriers to physical therapy: self-limiting behaviors due to pain    Goals:     Short Terms Goals: 3 weeks     Goal  Progress  Date    (1)  Patient will be I with HOME EXERCISE PROGRAM  Initial, Not Met     (2)  Patient will increase cervical flexion to 35 degrees, cervical extension to 45 degrees, cervical rotation to 60 degrees.  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  Initial, Not Met            Plan     Continue with postural reeducation, periscapular strengthening, core strengthening, range of motion exercises for left upper extremity     Erick Shi, PT   11/5/2024

## 2024-11-05 NOTE — PATIENT INSTRUCTIONS
CERVICAL CHIN TUCK  AND RETRACTION - SUPINE WITH TOWEL        While lying on your back with a small folded up towel under your head, tuck your chin towards your chest. Also, focus on applying pressure on the towel with the back of your head.     Maintain contact of head with the towel the entire time.     Hold for 3 seconds. Perform 10 repetitions     Copyright © 2024 [employer]    CERVICAL ROTATIONS - SUPINE        While lying on your back, turn your head to one side and then turn it to the other side and repeat. Move in a comfortable range of motion    Hold for 3 seconds. Perform 10 repetitions     Copyright © 2024 [employer]      TRANSVERSUS ABDOMINUS TRAINING - SUPINE BRACING        While lying on your back with knees bent, use your thumbs or 2 fingers on each hand to find your pelvic bone (Anterior Superior Iliac Spine - ASIS).     Once that part of your pelvis is located, move your fingers 1-2 inches up and inward from there and press 2 fingers on each side gently inward at a 45 degree angle.     Next, tighten your stomach muscles as if someone was going to punch you in the stomach. Another way to think about this muscle contraction is to move your naval downward towards the table/bed.     If done correctly, your fingers should rise upwards as the muscle pushes your fingers out. Hold this contraction and then relax and repeat.     Hold for 3 seconds. Perform 10 repetitions     Copyright © 2024 [employer]      POSTERIOR PELVIC TILT        Lie on your back on a firm surface with knees comfortably bent (top picture).  Then flatten back against the table while shannon abdominal muscles as if pulling belly button toward ribs (bottom picture).     Perform  10  repetitions, hold for  3  Seconds.    Copyright © 2024 [employer]    TABLE SLIDE - FLEXION        Sit in a chair and rest your injured arm on a table.  Start by leaning forward towards the table as you slide your arm forward as shown. Then, return to  starting position and repeat.     Perform 10 repetitions     Copyright © 2024 [employer]    UPPER TRAP STRETCH        Begin by retracting your head back into a chin tuck position. Gently tilt your head towards the opposite. Hold and then repeat.    Hold for 30 seconds. Perform 3 repetitions    Copyright © 2024 [employer]    LEVATOR SCAPULAE STRETCH - HAND BEHIND BACK        Place your arm on the affected side behind your back and then tilt your head to the side, then rotate to the side, then tip downward towards the opposite side as in looking at your opposite pocket.    You should feel a gentle stretch at the side/back of your neck.      Hold for 30 seconds. Perform 3 repetitions    Copyright © 2024 [employer]

## 2024-11-07 NOTE — PROGRESS NOTES
[facility]   Physical Therapy Treatment Note      Name: Lizabeth CRISTINA [facility] Number: [id number]    Therapy Diagnosis:   Encounter Diagnoses   Name Primary?    Decreased range of motion of left shoulder Yes    Acute neck pain     Muscle weakness     Acute left-sided low back pain with left-sided sciatica        Physician: Angelika Yost MD    Visit Date: 11/8/2024    Physician Orders: PT Eval and Treat   Medical Diagnosis from Referral:  Z02.6 (ICD-10-CM) - Encounter related to worker's compensation claim   M54.2 (ICD-10-CM) - Cervicalgia   G44.319 (ICD-10-CM) - Acute post-traumatic headache, not intractable    S39.012D (ICD-10-CM) - Acute myofascial strain of lumbar region, subsequent encounter      Evaluation Date: 10/28/2024  Authorization Period Expiration:   Plan of Care Expiration: 12/23/2024  Visit # / Visits authorized: 3 (2/9)      Foto  Date  Score    #1/3 10/28/2024 43%   #2/3       #3/3          Time In: 11:23 am  Time Out: 12:13 pm  Total Appointment Time (timed & untimed codes): 50 minutes     Precautions: Standard       Visit #/Visits authorized: 3/9   (# of No Show Appts 0/Number of Cancelled Appts 0)    Occupation/job title:  [employer] [place]  Job demands: forensic instructor, implemented programs: walking, standing for meeting, working at a computer, teaching, 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      She was compliant with home exercise program.  Response to previous treatment: increased pain  Function: no change noted    Pain: 8/10  Location: left back, neck, and shoulder     Objective   "    Objective Measures updated at progress report unless specified.     Treatment     Lizabeth received the treatments listed below:      therapeutic exercises to develop strength, ROM, flexibility, and posture for 10 minutes including:  [x] Pectoralis stretch on towel roll with diaphragmatic breathing x 3'  [] Shoulder pendulums: flexion/extension, side to side, circles (clockwise/counter clockwise) x 20 each  [] shoulder external rotation with dowel x 3'  [x] upper trapezius stretch 3 x 30" on left   [x] levator scapula stretch 3 x 30" on left   [x] + sternocleidomastoid stretch 2x 30 seconds on left   [x] + scalene stretch 2x 30 seconds on left     manual therapy techniques: Joint mobilizations, Myofacial release, Soft tissue Mobilization, and Friction Massage were applied to the: left upper shoulder, neck for 25 minutes, including:  [x]Soft tissue mobilization and Instrument Assisted Soft Tissue Mobilization left upper trapezius, left levator scapula, left supraspinatus, left scalenes  [x] Mobilization with movement to left 1st rib     neuromuscular re-education activities to improve: Posture and muscle activation, muscle sequencing, and muscle strengthening for 15 minutes. The following activities were included:  [] scapular retractions on towel roll x 20 repetitions  [] chin retractions on towel roll x 10 repetitions   [] chin retractions with rotation x 10 repetitions     [] transverse abdominus bracing x 15 repetitions   [] posterior pelvic tilts x 15 repetitions   [] hook lying 3 way clams x 20 repetitions     [x] table top shoulder flexion x10 repetitions     [x] dorsal scapular nerve glides (shoulder shrug with nodding, with side bending) x10    therapeutic activities to improve functional performance for 00  minutes, including:        Patient Education and Home Exercises       Education provided:   Exercise form and purpose    Home Exercises Provided: Patient instructed to cont prior home exercise " program. Exercises were reviewed and Lizabeth was able to demonstrate them prior to the end of the session.  Lizabeth demonstrated good  understanding of the education provided. See Electronic Medical Record  under Patient Instructions for exercises provided during therapy sessions    Assessment     Increased tension and tender to palpation in left upper trapezius, scalenes, supraspinatus, and levator scapula remain present today with patient reporting improvement in symptoms post manual interventions. Manual performed first today with patient reported having better tolerance to exercises. Patient wishes to hold off on needling for a few more visits to see if other manual interventions are helpful. Multiple rest periods provided between exercises and reps due to discomfort.     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     Patient prognosis is: Guarded.   Rehab potential is: guarded    Patient will continue to benefit from skilled Physical Therapy interventions in order to address the deficits listed in the problem list box on initial evaluation, provide education, and to address the musculoskeletal limitations and work-related functional deficits for their job as a  [employer] [place].    Patient's spiritual, cultural and educational needs considered and patient agreeable to plan of care and goals.     Anticipated barriers to physical therapy: self-limiting behaviors due to pain    Goals:     Short Terms Goals: 3 weeks     Goal  Progress  Date    (1)  Patient will be I with HOME EXERCISE PROGRAM  Initial, Not Met     (2)  Patient will increase cervical flexion to 35 degrees, cervical extension to 45 degrees, cervical rotation to 60 degrees.  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  Initial, Not Met            Plan     Continue with postural reeducation, periscapular strengthening, core strengthening, range of motion exercises for left upper extremity     Samantha Ibarra, PT   11/8/2024

## 2024-11-08 ENCOUNTER — CLINICAL SUPPORT (OUTPATIENT)
Dept: REHABILITATION | Facility: OTHER | Age: 54
End: 2024-11-08
Payer: COMMERCIAL

## 2024-11-08 DIAGNOSIS — M54.42 ACUTE LEFT-SIDED LOW BACK PAIN WITH LEFT-SIDED SCIATICA: ICD-10-CM

## 2024-11-08 DIAGNOSIS — M54.2 ACUTE NECK PAIN: ICD-10-CM

## 2024-11-08 DIAGNOSIS — M62.81 MUSCLE WEAKNESS: ICD-10-CM

## 2024-11-08 DIAGNOSIS — M25.612 DECREASED RANGE OF MOTION OF LEFT SHOULDER: Primary | ICD-10-CM

## 2024-11-08 PROCEDURE — 97140 MANUAL THERAPY 1/> REGIONS: CPT | Mod: PN

## 2024-11-08 PROCEDURE — 97112 NEUROMUSCULAR REEDUCATION: CPT | Mod: PN

## 2024-11-08 PROCEDURE — 97110 THERAPEUTIC EXERCISES: CPT | Mod: PN

## 2024-11-14 ENCOUNTER — CLINICAL SUPPORT (OUTPATIENT)
Dept: REHABILITATION | Facility: OTHER | Age: 54
End: 2024-11-14
Attending: STUDENT IN AN ORGANIZED HEALTH CARE EDUCATION/TRAINING PROGRAM
Payer: COMMERCIAL

## 2024-11-14 ENCOUNTER — DOCUMENTATION ONLY (OUTPATIENT)
Dept: REHABILITATION | Facility: OTHER | Age: 54
End: 2024-11-14
Payer: COMMERCIAL

## 2024-11-14 DIAGNOSIS — M54.42 ACUTE LEFT-SIDED LOW BACK PAIN WITH LEFT-SIDED SCIATICA: ICD-10-CM

## 2024-11-14 DIAGNOSIS — M62.81 MUSCLE WEAKNESS: ICD-10-CM

## 2024-11-14 DIAGNOSIS — M54.2 ACUTE NECK PAIN: ICD-10-CM

## 2024-11-14 DIAGNOSIS — M25.612 DECREASED RANGE OF MOTION OF LEFT SHOULDER: Primary | ICD-10-CM

## 2024-11-14 PROCEDURE — 97112 NEUROMUSCULAR REEDUCATION: CPT | Mod: PN,CQ

## 2024-11-14 PROCEDURE — 97110 THERAPEUTIC EXERCISES: CPT | Mod: PN,CQ

## 2024-11-14 NOTE — PROGRESS NOTES
[facility]   Physical Therapy Treatment Note      Name: Lizabeth CRISTINA [facility] Number: [id number]    Therapy Diagnosis:   Encounter Diagnoses   Name Primary?    Decreased range of motion of left shoulder Yes    Acute neck pain     Muscle weakness     Acute left-sided low back pain with left-sided sciatica        Physician: Angelika Yost MD    Visit Date: 11/14/2024    Physician Orders: PT Eval and Treat   Medical Diagnosis from Referral:  Z02.6 (ICD-10-CM) - Encounter related to worker's compensation claim   M54.2 (ICD-10-CM) - Cervicalgia   G44.319 (ICD-10-CM) - Acute post-traumatic headache, not intractable    S39.012D (ICD-10-CM) - Acute myofascial strain of lumbar region, subsequent encounter      Evaluation Date: 10/28/2024  Authorization Period Expiration:   Plan of Care Expiration: 12/23/2024  Visit # / Visits authorized: 3 (2/9)      Foto  Date  Score    #1/3 10/28/2024 43%   #2/3       #3/3          Time In: 7:58 am  Time Out: 8:47 am  Total Appointment Time (timed & untimed codes): 49 minutes     Precautions: Standard       Visit #/Visits authorized: 4/9   (# of No Show Appts 1 / Number of Cancelled Appts 1)    Occupation/job title:  [employer]  Job demands: forensic instructor, implemented programs: walking, standing for meeting, working at a computer, teaching, 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 She missed her appointment yesterday and decided to come in today to see if she could be seen    She was compliant with home exercise program.  Response to previous treatment: Felt the same   Function: None today    Pain: 8/10  Location: left back, neck, and shoulder     Objective   "    Objective Measures updated at progress report unless specified.     Treatment         therapeutic exercises to develop strength, ROM, flexibility, and posture for 11 minutes including:  [x] +Pulleys flexion x 3 min  [x] Pectoralis stretch on towel roll with diaphragmatic breathing x 3 minutes  [x] +Lower trunk rotation with physio ball x 3 min  [] Shoulder pendulums: flexion/extension, side to side, circles (clockwise/counter clockwise) x 20 each  [] Shoulder external rotation with dowel x 3'  [x] Upper trapezius stretch 2 x 30" on left   [x] Levator scapula stretch 2 x 30" on left   [] Sternocleidomastoid stretch 2 x 30 seconds on left   [] Scalene stretch 2 x 30 seconds on left     manual therapy techniques: Joint mobilizations, Myofacial release, Soft tissue Mobilization, and Friction Massage were applied to the: left upper shoulder, neck for 00 minutes, including:  [] Soft tissue mobilization and Instrument Assisted Soft Tissue Mobilization left upper trapezius, left levator scapula, left supraspinatus, left scalenes  [] Mobilization with movement to left 1st rib     neuromuscular re-education activities to improve: Posture and muscle activation, muscle sequencing, and muscle strengthening for 38 minutes. The following activities were included:  [x] scapular retractions on towel roll x 20 repetitions  [] chin retractions on towel roll x 10 repetitions   [] chin retractions with rotation x 10 repetitions     [x] Supine transverse abdominus activation with physio ball 10 x 3"  [x] Posterior pelvic tilts 10 x 3"    [] Hook lying 3 way clams x 20     [] +Shoulder shrugs  [x] +Seated scapular depression x 10 each  [] Table top shoulder flexion x10 repetitions     [] Dorsal scapular nerve glides (shoulder shrug with nodding, with side bending) x 10    therapeutic activities to improve functional performance for 00  minutes, including:        Patient Education and Home Exercises       Education provided:   Exercise " form and purpose    Home Exercises Provided: Patient instructed to cont prior home exercise program. Exercises were reviewed and Lizabeth was able to demonstrate them prior to the end of the session.  Lizabeth demonstrated good  understanding of the education provided. See Electronic Medical Record  under Patient Instructions for exercises provided during therapy sessions    Assessment   Attempted several new exercises today for core and shoulders. Patient reported pain in left shoulder with supine positioning and pain in low back with stretches, that eventually eased up. But patient noted increasing concussive symptoms as treatment progressed. Patient requested to end session early due to symptoms. Will continue to monitor symptoms and progress accordingly.    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     Patient prognosis is: Guarded.   Rehab potential is: guarded    Patient will continue to benefit from skilled Physical Therapy interventions in order to address the deficits listed in the problem list box on initial evaluation, provide education, and to address the musculoskeletal limitations and work-related functional deficits for their job as a  [employer] [place].    Patient's spiritual, cultural and educational needs considered and patient agreeable to plan of care and goals.     Anticipated barriers to physical therapy: self-limiting behaviors due to pain    Goals:     Short Terms Goals: 3 weeks     Goal  Progress  Date    (1)  Patient will be I with HOME EXERCISE PROGRAM  Initial, Not Met     (2)  Patient will increase cervical flexion to 35 degrees, cervical extension to 45 degrees, cervical rotation to 60 degrees.  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  Initial, Not Met          Plan     Continue with postural reeducation, periscapular strengthening, core strengthening, range of motion exercises for left upper extremity     Fabiana Wynne III, PTA   11/14/24

## 2024-11-14 NOTE — PROGRESS NOTES
Physical Therapy No Show Note       Patient was scheduled for physical therapy at [facility] for 11/13/2024. Pt failed to appear for appointment without prior notification for today.        Erick Shi, PT

## 2024-11-19 ENCOUNTER — DOCUMENTATION ONLY (OUTPATIENT)
Dept: REHABILITATION | Facility: OTHER | Age: 54
End: 2024-11-19
Payer: COMMERCIAL

## 2024-11-20 NOTE — PROGRESS NOTES
Patient was scheduled for a physical therapy treatment appointment at [facility] on 11/19/2024. Patient failed to appear for the appointment without prior notification.     Fabiana Wynne III, PTA

## 2024-11-21 ENCOUNTER — DOCUMENTATION ONLY (OUTPATIENT)
Dept: REHABILITATION | Facility: OTHER | Age: 54
End: 2024-11-21
Payer: COMMERCIAL

## 2024-11-21 NOTE — PROGRESS NOTES
Physical Therapy No Show Note       Patient was scheduled for physical therapy at [facility] for 11/21/2024. Pt failed to appear for appointment without prior notification for today, as well as 11/19/2024.         Erick Shi, PT

## 2024-11-25 NOTE — PROGRESS NOTES
"[facility]   Physical Therapy Treatment Note      [facility]  [facility] Number[facility] [id number]    Therapy Diagnosis:   Encounter Diagnoses   Name Primary?    Decreased range of motion of left shoulder Yes    Acute neck pain     Muscle weakness     Acute left-sided low back pain with left-sided sciatica          Physician: Angelika Yost MD    Visit Date: 11/26/2024    Physician Orders: PT Eval and Treat   Medical Diagnosis from Referral:  Z02.6 (ICD-10-CM) - Encounter related to worker's compensation claim   M54.2 (ICD-10-CM) - Cervicalgia   G44.319 (ICD-10-CM) - Acute post-traumatic headache, not intractable    S39.012D (ICD-10-CM) - Acute myofascial strain of lumbar region, subsequent encounter      Evaluation Date: 10/28/2024  Authorization Period Expiration:   Plan of Care Expiration: 12/23/2024     Foto  Date  Score    #1/3 10/28/2024 43%   #2/3       #3/3          Time In: 4:05 pm  Time Out: 4:51 pm  Total Appointment Time (timed & untimed codes): 46 minutes     Precautions: Standard     PTA Visit #: 1/ 5    Visit #/Visits authorized: 5/ 9   (# of No Show Appts 1 / Number of Cancelled Appts 1)    Occupation/job title:  [employer] [place]  Job demands: forensic instructor, implemented programs: walking, standing for meeting, working at a computer, teaching, 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 Shoulder is feeling tight today. She was also prescribed muscle relaxers. She stated she was having muscle spasms in back upon entering clinic.     She was compliant with home exercise program.   Response to previous treatment: "I loosened up a bit"  Function: " "None today     Pain: 8/10   Location: left back, neck, and shoulder     Objective      Objective Measures updated at progress report unless specified.     Treatment         therapeutic exercises to develop strength, ROM, flexibility, and posture for 8 minutes including:  [] Pulleys flexion x 3 min  [x] Pectoralis stretch on towel roll with diaphragmatic breathing x 3 minutes  [x] Lower trunk rotation with physio ball x 3 min  [] Shoulder pendulums: flexion/extension, side to side, circles (clockwise/counter clockwise) x 20 each  [] Shoulder external rotation with dowel x 3'  [] Upper trapezius stretch 2 x 30" on left   [] Levator scapula stretch 2 x 30" on left   [] Sternocleidomastoid stretch 2 x 30 seconds on left   [] Scalene stretch 2 x 30 seconds on left   [x] +Hamstring stretch 2 x 30" each  (Therapist assist)    manual therapy techniques: Joint mobilizations, Myofacial release, Soft tissue Mobilization, and Friction Massage were applied to the: left upper shoulder, neck for 00 minutes, including:  [] Soft tissue mobilization and Instrument Assisted Soft Tissue Mobilization left upper trapezius, left levator scapula, left supraspinatus, left scalenes  [] Mobilization with movement to left 1st rib     neuromuscular re-education activities to improve: Posture and muscle activation, muscle sequencing, and muscle strengthening for 38 minutes. The following activities were included:  [x] Scapular retractions on towel roll x 20 repetitions   [] chin retractions on towel roll x 10 repetitions   [] chin retractions with rotation x 10 repetitions     [x] Supine transverse abdominus activation with physio ball 10 x 3"   [x] Posterior pelvic tilts 10 x 3"    [] Hook lying 3 way clams x 20   [x] +Bridge with legs on physio ball x 10    [x] +Shoulder shrugs x 10  [x] Seated scapular depression x 10 each  [] Table top shoulder flexion x10 repetitions     [] Dorsal scapular nerve glides (shoulder shrug with nodding, with " side bending) x 10    therapeutic activities to improve functional performance for 00  minutes, including:        Patient Education and Home Exercises       Education provided:   Exercise form and purpose    Home Exercises Provided: Patient instructed to cont prior home exercise program. Exercises were reviewed and Lizabeth was able to demonstrate them prior to the end of the session.  Lizabeth demonstrated good  understanding of the education provided. See Electronic Medical Record  under Patient Instructions for exercises provided during therapy sessions    Assessment   Limited progression today due to muscle spasms in back and concussive symptoms. Applied moist heat to low back while performing supine exercises. Patient able to tolerated low repetitions with new exercises, but ultimately ended session early due to concussive symptoms. Patient noted low back spasms decreased with the application of heat. Will continue to monitor symptoms and progress accordingly.    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     Patient prognosis is: Guarded.   Rehab potential is: guarded    Patient will continue to benefit from skilled Physical Therapy interventions in order to address the deficits listed in the problem list box on initial evaluation, provide education, and to address the musculoskeletal limitations and work-related functional deficits for their job as a  [employer] [place].    Patient's spiritual, cultural and educational needs considered and patient agreeable to plan of care and goals.     Anticipated barriers to physical therapy: self-limiting behaviors due to pain    Goals:     Short Terms Goals: 3 weeks     Goal  Progress  Date    (1)  Patient will be I with  HOME EXERCISE PROGRAM  Initial, Not Met     (2)  Patient will increase cervical flexion to 35 degrees, cervical extension to 45 degrees, cervical rotation to 60 degrees.  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  Initial, Not Met        Plan     Continue with postural reeducation, periscapular strengthening, core strengthening, range of motion exercises for left upper extremity     Fabiana Wynne III, PTA   11/26/24

## 2024-11-26 ENCOUNTER — OFFICE VISIT (OUTPATIENT)
Dept: URGENT CARE | Facility: CLINIC | Age: 54
End: 2024-11-26
Payer: COMMERCIAL

## 2024-11-26 ENCOUNTER — CLINICAL SUPPORT (OUTPATIENT)
Dept: REHABILITATION | Facility: OTHER | Age: 54
End: 2024-11-26
Payer: COMMERCIAL

## 2024-11-26 VITALS
RESPIRATION RATE: 20 BRPM | SYSTOLIC BLOOD PRESSURE: 134 MMHG | BODY MASS INDEX: 39.06 KG/M2 | TEMPERATURE: 98 F | HEART RATE: 74 BPM | OXYGEN SATURATION: 98 % | HEIGHT: 63 IN | DIASTOLIC BLOOD PRESSURE: 83 MMHG | WEIGHT: 220.44 LBS

## 2024-11-26 DIAGNOSIS — M75.02 ADHESIVE CAPSULITIS OF LEFT SHOULDER: ICD-10-CM

## 2024-11-26 DIAGNOSIS — M25.512 LEFT SHOULDER PAIN, UNSPECIFIED CHRONICITY: ICD-10-CM

## 2024-11-26 DIAGNOSIS — G44.319 ACUTE POST-TRAUMATIC HEADACHE, NOT INTRACTABLE: ICD-10-CM

## 2024-11-26 DIAGNOSIS — Z02.6 ENCOUNTER RELATED TO WORKER'S COMPENSATION CLAIM: ICD-10-CM
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
## 2024-11-26 DIAGNOSIS — M54.2 CERVICALGIA: ICD-10-CM

## 2024-11-26 DIAGNOSIS — M62.81 MUSCLE WEAKNESS: ICD-10-CM

## 2024-11-26 DIAGNOSIS — G89.29 CHRONIC LEFT SHOULDER PAIN: ICD-10-CM

## 2024-11-26 DIAGNOSIS — M25.512 CHRONIC LEFT SHOULDER PAIN: ICD-10-CM

## 2024-11-26 DIAGNOSIS — M25.612 DECREASED RANGE OF MOTION OF LEFT SHOULDER: Primary | ICD-10-CM

## 2024-11-26 DIAGNOSIS — S06.0X0S CONCUSSION WITHOUT LOSS OF CONSCIOUSNESS, SEQUELA: Primary | ICD-10-CM

## 2024-11-26 DIAGNOSIS — S16.1XXD CERVICAL MUSCLE STRAIN, SUBSEQUENT ENCOUNTER: ICD-10-CM

## 2024-11-26 DIAGNOSIS — M54.42 ACUTE LEFT-SIDED LOW BACK PAIN WITH LEFT-SIDED SCIATICA: ICD-10-CM

## 2024-11-26 DIAGNOSIS — S39.012D ACUTE MYOFASCIAL STRAIN OF LUMBAR REGION, SUBSEQUENT ENCOUNTER: ICD-10-CM

## 2024-11-26 DIAGNOSIS — M54.2 ACUTE NECK PAIN: ICD-10-CM

## 2024-11-26 PROCEDURE — 73030 X-RAY EXAM OF SHOULDER: CPT | Mod: LT,S$GLB,, | Performed by: RADIOLOGY

## 2024-11-26 PROCEDURE — 97110 THERAPEUTIC EXERCISES: CPT | Mod: PN,CQ

## 2024-11-26 PROCEDURE — 97112 NEUROMUSCULAR REEDUCATION: CPT | Mod: PN,CQ

## 2024-11-26 RX ORDER — MELOXICAM 15 MG/1
15 TABLET ORAL DAILY
Qty: 30 TABLET | Refills: 0 | Status: SHIPPED | OUTPATIENT
Start: 2024-11-26

## 2024-11-26 RX ORDER — PREDNISONE 10 MG/1
TABLET ORAL
Qty: 36 TABLET | Refills: 0 | Status: SHIPPED | OUTPATIENT
Start: 2024-11-26 | End: 2024-12-08

## 2024-11-26 RX ORDER — METHOCARBAMOL 500 MG/1
500 TABLET, FILM COATED ORAL 2 TIMES DAILY PRN
Qty: 30 TABLET | Refills: 0 | Status: SHIPPED | OUTPATIENT
Start: 2024-11-26 | End: 2024-12-11
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
## 2024-11-26 NOTE — LETTER
[facility] [address] [address]  Phone: [phone]  Fax: [phone]  [facility]: [id number]    Pt Name: Lizabeth Gomez  Injury Date: 09/19/2024   Employee ID: [id number] Date of Treatment: 11/26/2024   Company: [employer]      Appointment Time: 09:45 AM Arrived: 10:00 AM   Provider: Moreno Zafar PA-C Time Out: 11:35 AM     Office Treatment:   1. Concussion without loss of consciousness, sequela    2. Encounter related to worker's compensation claim    3. Left shoulder pain, unspecified chronicity    4. Cervicalgia    5. Acute post-traumatic headache, not intractable    6. Acute myofascial strain of lumbar region, subsequent encounter    7. Cervical muscle strain, subsequent encounter    8. Chronic left shoulder pain      Medications Ordered This Encounter   Medications    meloxicam (MOBIC) 15 MG tablet    methocarbamoL (ROBAXIN) 500 MG Tab    predniSONE (DELTASONE) 10 MG tablet      Patient Instructions: Attention not to aggravate affected area, Daily home exercises/warm soaks, MRI to be scheduled once authorized, Referral to specialist to be scheduled, once authorized      Restrictions: Sit down work only (Allow To take frequent breaks 10-15 minutes/hour)     Return Appointment: 12/10/2024 at 10:30 AM    TERE

## 2024-12-05 ENCOUNTER — TELEPHONE (OUTPATIENT)
Dept: NEUROLOGY | Facility: CLINIC | Age: 54
End: 2024-12-05
Payer: COMMERCIAL

## 2024-12-05 ENCOUNTER — CLINICAL SUPPORT (OUTPATIENT)
Dept: REHABILITATION | Facility: OTHER | Age: 54
End: 2024-12-05
Payer: COMMERCIAL

## 2024-12-05 DIAGNOSIS — M62.81 MUSCLE WEAKNESS: ICD-10-CM

## 2024-12-05 DIAGNOSIS — M25.612 DECREASED RANGE OF MOTION OF LEFT SHOULDER: Primary | ICD-10-CM

## 2024-12-05 DIAGNOSIS — M54.42 ACUTE LEFT-SIDED LOW BACK PAIN WITH LEFT-SIDED SCIATICA: ICD-10-CM

## 2024-12-05 DIAGNOSIS — M54.2 ACUTE NECK PAIN: ICD-10-CM

## 2024-12-05 PROCEDURE — 97112 NEUROMUSCULAR REEDUCATION: CPT | Mod: PN

## 2024-12-05 NOTE — TELEPHONE ENCOUNTER
Spoke to Pt about scheduling an appt for her concussion (WC). Pt is scheduled for December 17. Pt was advised to arrive 30 min early and informed about the 15 min cornelia period.

## 2024-12-05 NOTE — PROGRESS NOTES
"[facility]   Physical Therapy Treatment Note      Name: Lizabeth CRISTINA [facility] Number: [id number]    Therapy Diagnosis:   Encounter Diagnoses   Name Primary?    Decreased range of motion of left shoulder Yes    Acute neck pain     Muscle weakness     Acute left-sided low back pain with left-sided sciatica          Physician: Angelika Yost MD    Visit Date: 12/5/2024    Physician Orders: PT Eval and Treat   Medical Diagnosis from Referral:  Z02.6 (ICD-10-CM) - Encounter related to worker's compensation claim   M54.2 (ICD-10-CM) - Cervicalgia   G44.319 (ICD-10-CM) - Acute post-traumatic headache, not intractable    S39.012D (ICD-10-CM) - Acute myofascial strain of lumbar region, subsequent encounter      Evaluation Date: 10/28/2024  Authorization Period Expiration:   Plan of Care Expiration: 12/23/2024     Foto  Date  Score    #1/3 10/28/2024 43%   #2/3       #3/3          Time In: 1400  Time Out: 1442  Total Appointment Time (timed & untimed codes): 42 minutes     Precautions: Standard     PTA Visit #: 2/ 5    Visit #/Visits authorized: 6/ 9   (# of No Show Appts 1 / Number of Cancelled Appts 1)    Occupation/job title:  [employer]  Job demands: forensic instructor, implemented programs: walking, standing for meeting, working at a computer, teaching, 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 Will be having an open MRI for her neck and shoulder. Has been at dance class which the movement has helped to loosen up her back    She was compliant with home exercise program.   Response to previous treatment: "it was okay:  Function: None today     Pain: 8/10 , 7/10  Location: left back, neck, and shoulder " "    Objective      Objective Measures updated at progress report unless specified.     Treatment         therapeutic exercises to develop strength, ROM, flexibility, and posture for 3 minutes including:  [] Pulleys flexion x 3 min  [] Pectoralis stretch on towel roll with diaphragmatic breathing x 3 minutes  [x] Lower trunk rotation with physio ball x 3 min  [] Shoulder pendulums: flexion/extension, side to side, circles (clockwise/counter clockwise) x 20 each  [] Shoulder external rotation with dowel x 3'  [] Upper trapezius stretch 2 x 30" on left   [] Levator scapula stretch 2 x 30" on left   [] Sternocleidomastoid stretch 2 x 30 seconds on left   [] Scalene stretch 2 x 30 seconds on left   [] Hamstring stretch 2 x 30" each  (Therapist assist)    manual therapy techniques: Joint mobilizations, Myofacial release, Soft tissue Mobilization, and Friction Massage were applied to the: left upper shoulder, neck for 00 minutes, including:  [] Soft tissue mobilization and Instrument Assisted Soft Tissue Mobilization left upper trapezius, left levator scapula, left supraspinatus, left scalenes  [] Mobilization with movement to left 1st rib     neuromuscular re-education activities to improve: Posture and muscle activation, muscle sequencing, and muscle strengthening for 39 minutes. The following activities were included:  [x] Scapular retractions x 15 repetitions   [] chin retractions on towel roll x 10 repetitions   [] chin retractions with rotation x 10 repetitions     [x] Supine transverse abdominus activation with physio ball 15 x 3"   [x] Posterior pelvic tilts 15 x 3"    [x] Hook lying 3 way clams x 20 with red band   [] Bridge with legs on physio ball x 10  [x]+Bridging 2 x 10 repetitions   [x]+seated 3 way internal rotation with ball squeeze x 15 repetitions   [x] Shoulder shrugs x 15  [x] Seated scapular depression x 15 each  [x] Table top shoulder flexion x10 repetitions     [] Dorsal scapular nerve glides " (shoulder shrug with nodding, with side bending) x 10    therapeutic activities to improve functional performance for 00  minutes, including:        Patient Education and Home Exercises       Education provided:   Exercise form and purpose    Home Exercises Provided: Patient instructed to cont prior home exercise program. Exercises were reviewed and Lizabeth was able to demonstrate them prior to the end of the session.  Lizabeth demonstrated good  understanding of the education provided. See Electronic Medical Record  under Patient Instructions for exercises provided during therapy sessions    Assessment   Able to perform exercises today with mild increase in her headache. Continued with core strengthening and postural reeducation today. Left shoulder flexion and abduction still significantly limited.    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     Patient prognosis is: Guarded.   Rehab potential is: guarded    Patient will continue to benefit from skilled Physical Therapy interventions in order to address the deficits listed in the problem list box on initial evaluation, provide education, and to address the musculoskeletal limitations and work-related functional deficits for their job as a  [employer].    Patient's spiritual, cultural and educational needs considered and patient agreeable to plan of care and goals.     Anticipated barriers to physical therapy: self-limiting behaviors due to pain    Goals:     Short Terms Goals: 3 weeks     Goal  Progress  Date    (1)  Patient will be I with HOME EXERCISE PROGRAM  Initial, Not Met     (2)  Patient will increase cervical flexion to 35 degrees, cervical extension to 45 degrees, cervical rotation to 60  degrees.  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  Initial, Not Met        Plan     Continue with postural reeducation, periscapular strengthening, core strengthening, range of motion exercises for left upper extremity     Erick Shi, PT   12/5/24

## 2024-12-10 ENCOUNTER — TELEPHONE (OUTPATIENT)
Dept: NEUROLOGY | Facility: CLINIC | Age: 54
End: 2024-12-10
Payer: COMMERCIAL

## 2024-12-10 NOTE — TELEPHONE ENCOUNTER
Spoke to Pt about rescheduling her appt. Pt stated December 17 no longer works for her. Pt is rescheduled for January 6.     ----- Message from Kay sent at 12/10/2024 10:55 AM CST -----  Regarding: R/S Appt  Contact: [phone]  Who call ? Lizabeth Gomez     What is the request Details : Pt calling to speak with someone in provider office regards r/s appt on 12/17 . Works comp related.  Please call pt back.       Can clinic  use patient portal  : No     What number to call back : [phone]

## 2024-12-18 ENCOUNTER — OFFICE VISIT (OUTPATIENT)
Dept: URGENT CARE | Facility: CLINIC | Age: 54
End: 2024-12-18
Payer: COMMERCIAL

## 2024-12-18 VITALS
DIASTOLIC BLOOD PRESSURE: 81 MMHG | HEART RATE: 75 BPM | OXYGEN SATURATION: 99 % | SYSTOLIC BLOOD PRESSURE: 133 MMHG | HEIGHT: 63 IN | TEMPERATURE: 98 F | RESPIRATION RATE: 20 BRPM | BODY MASS INDEX: 38.98 KG/M2 | WEIGHT: 220 LBS
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
## 2024-12-18 DIAGNOSIS — M54.2 CERVICALGIA: ICD-10-CM

## 2024-12-18 DIAGNOSIS — S42.292S: ICD-10-CM

## 2024-12-18 DIAGNOSIS — S06.0X0S CONCUSSION WITHOUT LOSS OF CONSCIOUSNESS, SEQUELA: Primary | ICD-10-CM

## 2024-12-18 DIAGNOSIS — S46.812S TRAUMATIC RUPTURE OF SUPRASPINATUS TENDON OF LEFT SHOULDER, SEQUELA: ICD-10-CM

## 2024-12-18 DIAGNOSIS — M75.22 BICEPS TENDONITIS ON LEFT: ICD-10-CM

## 2024-12-18 DIAGNOSIS — S39.012D ACUTE MYOFASCIAL STRAIN OF LUMBAR REGION, SUBSEQUENT ENCOUNTER: ICD-10-CM

## 2024-12-18 DIAGNOSIS — Z02.6 ENCOUNTER RELATED TO WORKER'S COMPENSATION CLAIM: ICD-10-CM

## 2024-12-18 PROCEDURE — 99214 OFFICE O/P EST MOD 30 MIN: CPT | Mod: S$GLB,,, | Performed by: PHYSICIAN ASSISTANT

## 2024-12-18 RX ORDER — TIZANIDINE 4 MG/1
4 TABLET ORAL 3 TIMES DAILY
Qty: 20 TABLET | Refills: 0 | Status: SHIPPED | OUTPATIENT
Start: 2024-12-18

## 2024-12-18 RX ORDER — MELOXICAM 15 MG/1
15 TABLET ORAL DAILY
Qty: 30 TABLET | Refills: 0 | Status: SHIPPED | OUTPATIENT
Start: 2024-12-18

## 2024-12-18 NOTE — LETTER
[facility] [address] [address]  Phone: [phone]  Fax: [phone]  [facility]: [id number]    Pt Name: Lizabeth Gomez  Injury Date: 09/19/2024   Employee ID: [id number] Date of Treatment: 12/18/2024   Company: [employer]      Appointment Time: 10:45 AM Arrived: 10:58 AM   Provider: Moreno Zafar PA-C Time Out: 12:14 PM     Office Treatment:   1. Concussion without loss of consciousness, sequela    2. Encounter related to worker's compensation claim    3. Acute myofascial strain of lumbar region, subsequent encounter    4. Cervicalgia    5. Traumatic rupture of supraspinatus tendon of left shoulder, sequela    6. Biceps tendonitis on left    7. Fracture of humeral head, closed, left, sequela    8. Work related injury      Medications Ordered This Encounter   Medications    meloxicam (MOBIC) 15 MG tablet    tiZANidine (ZANAFLEX) 4 MG tablet      Patient Instructions: Attention not to aggravate affected area, Daily home exercises/warm soaks, Referral to specialist to be scheduled, once authorized, Continue Physical Therapy      Restrictions: Limited use of left hand and arm, No above the shoulder/overhead work, Sit down work only (Allow To take frequent breaks 10-15 minutes/hour)     Return Appointment: 1/15/2025 at 9:00 AM    TERE

## 2024-12-18 NOTE — PROGRESS NOTES
Subjective:      Patient ID: Lizabeth Gomez is a 54 y.o. female.    Chief Complaint: No chief complaint on file.    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  BACK, NECK SAME  Pain Scale right now (1-10) -  8/10      Injury  ROS  Objective:     Physical Exam   Assessment:      No diagnosis found.  Plan:                 No follow-ups on file.

## 2024-12-18 NOTE — PROGRESS NOTES
Subjective:      Patient ID: Lizabeth Gomez is a 54 y.o. female.    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  Patient reports low back has improved.  Also reports less frequent headaches.  Continues to have persistent pain in the left neck and shoulder region.  Also has intermittent tingling in the left hand.  Patient had an MRI of the C-spine and left shoulder in the interim.  She is here for review.    Injury  Associated symptoms include arthralgias, headaches, neck pain and numbness. Pertinent negatives include no nausea or vomiting.       Constitution: Positive for activity change.   HENT:  Positive for facial trauma.         Phonophobia   Neck: Positive for neck pain and neck stiffness.   Cardiovascular:  Negative for chest trauma.   Eyes:  Positive for photophobia. Negative for vision loss, double vision and blurred vision.   Respiratory:  Negative for shortness of breath.    Gastrointestinal:  Negative for nausea and vomiting.   Musculoskeletal:  Positive for pain, trauma, joint pain, abnormal ROM of joint and back pain.   Skin:  Negative for wound.   Neurological:  Positive for dizziness, headaches, numbness and tingling. Negative for history of migraines.   Psychiatric/Behavioral:  Positive for sleep disturbance. Negative for history of mental illness.      Objective:     Physical Exam  Vitals and nursing note reviewed.   Constitutional:       General: She is not in acute distress.     Appearance: Normal appearance. She is well-developed.  She is not ill-appearing, toxic-appearing or diaphoretic.   HENT:      Head: Normocephalic and atraumatic.      Jaw: No trismus or pain on movement.      Right Ear: Hearing, tympanic membrane, ear canal and external ear normal.      Left Ear: Hearing, tympanic membrane, ear canal and external ear normal.      Nose: Nose normal. No nasal deformity, mucosal edema or rhinorrhea.      Right Sinus: No maxillary sinus tenderness or frontal sinus tenderness.      Left Sinus: No maxillary sinus tenderness or frontal sinus tenderness.      Mouth/Throat:      Dentition: Normal dentition.      Pharynx: Uvula midline. No posterior oropharyngeal erythema or uvula swelling.   Eyes:      General: Lids are normal. Vision grossly intact. No scleral icterus.     Extraocular Movements: Extraocular movements intact.      Conjunctiva/sclera: Conjunctivae normal.      Pupils: Pupils are equal, round, and reactive to light.      Comments: Sclera clear bilat   Neck:      Trachea: Trachea normal.   Cardiovascular:      Rate and Rhythm: Normal rate and regular rhythm.      Pulses: Normal pulses.      Heart sounds: Normal heart sounds.   Pulmonary:      Effort: Pulmonary effort is normal. No respiratory distress.      Breath sounds: Normal breath sounds.   Musculoskeletal:         General: No deformity.      Right shoulder: Normal pulse.      Left shoulder: Tenderness present. No deformity. Decreased range of motion. Decreased strength. Normal pulse.        Arms:       Cervical back: Neck supple. Spasms and tenderness present. No swelling, deformity or rigidity. Pain with movement present. Decreased range of motion.      Thoracic back: No tenderness.      Lumbar back: Tenderness present. Decreased range of motion. Negative right straight leg raise test and negative left straight leg raise test.        Back:       Comments: Left shoulder demonstrates markedly reduced range of motion.  Active range of motion: Flexion 80°, abduction 45°, unable  to determine internal rotation as patient can not put her hand behind her back.  Unable to fully perform passive range of motion due to pain in the superior trapezius and shoulder.  Patient guards when attempting passive range of motion.  Unable to perform special tests due to limited range of motion.   Skin:     General: Skin is warm and dry.      Coloration: Skin is not pale.   Neurological:      General: No focal deficit present.      Mental Status: She is alert and oriented to person, place, and time. She is not disoriented.      Cranial Nerves: Cranial nerves 2-12 are intact. No cranial nerve deficit.      Sensory: Sensation is intact.      Motor: Motor function is intact. No weakness or abnormal muscle tone.      Coordination: Coordination is intact. Romberg sign negative. Coordination normal. Finger-Nose-Finger Test normal. Rapid alternating movements normal.      Gait: Gait is intact. Gait and tandem walk normal.      Deep Tendon Reflexes:      Reflex Scores:       Tricep reflexes are 1+ on the right side and 1+ on the left side.       Bicep reflexes are 2+ on the right side and 2+ on the left side.       Brachioradialis reflexes are 2+ on the right side and 2+ on the left side.       Patellar reflexes are 2+ on the right side and 2+ on the left side.       Achilles reflexes are 2+ on the right side and 2+ on the left side.  Psychiatric:         Attention and Perception: Attention normal.         Mood and Affect: Mood normal.         Speech: Speech normal.         Behavior: Behavior normal. Behavior is cooperative.        MRI of the left shoulder without contrast reports tear of the supraspinatus, biceps tenosynovitis, nondisplaced fracture of the humeral head and arthritis.  See report in media for full details.  MRI of the C-spine is not yet available.    Assessment:      1. Concussion without loss of consciousness, sequela    2. Encounter related to worker's compensation claim    3. Acute myofascial  strain of lumbar region, subsequent encounter    4. Cervicalgia    5. Traumatic rupture of supraspinatus tendon of left shoulder, sequela    6. Biceps tendonitis on left    7. Fracture of humeral head, closed, left, sequela    8. Work related injury      Plan:     Patient with significant injury to left shoulder including rotator cuff tear, nondisplaced fracture, tendonitis and arthritis.  I will refer to orthopedics for further evaluation and management.  C-spine MRI still pending report.  I will notify patient of results and any changes to plan.  Patient was doing physical therapy with [facility], however worker's comp wanted to send her to a different PT provider.  She is currently in transition.  Advised to begin PT and continue home stretches as tolerated.  Patient is scheduled to see Neurology for concussion management on January 6, 2025.  Advised his continue to get plenty of rest both physical and cognitive.  She may take Tylenol as needed for headaches.  Return to clinic in 4 weeks or sooner as needed.  Patient verbalized understanding and agreement.      Medications Ordered This Encounter   Medications    meloxicam (MOBIC) 15 MG tablet     Sig: Take 1 tablet (15 mg total) by mouth once daily.     Dispense:  30 tablet     Refill:  0    tiZANidine (ZANAFLEX) 4 MG tablet     Sig: Take 1 tablet (4 mg total) by mouth 3 (three) times daily.     Dispense:  20 tablet     Refill:  0     Patient Instructions: Attention not to aggravate affected area, Daily home exercises/warm soaks, Referral to specialist to be scheduled, once authorized, Continue Physical Therapy   Restrictions: Limited use of left hand and arm, No above the shoulder/overhead work, Sit down work only (Allow To take frequent breaks 10-15 minutes/hour)  Follow up in about 4 weeks (around 1/15/2025) for Reassessment.

## 2024-12-20 ENCOUNTER — TELEPHONE (OUTPATIENT)
Dept: URGENT CARE | Facility: CLINIC | Age: 54
End: 2024-12-20
Payer: COMMERCIAL

## 2024-12-20 DIAGNOSIS — M50.922: ICD-10-CM

## 2024-12-20 DIAGNOSIS — M50.821 OTHER CERVICAL DISC DISORDERS AT C4-C5 LEVEL: Primary | ICD-10-CM

## 2024-12-20 NOTE — TELEPHONE ENCOUNTER
Discussed results MRI of the C-spine.  I will refer to pain management for evaluation and treatment.  Discussed possibility that her neck pain is due to her shoulder injury.   Cheek To Nose Interpolation Flap Division And Inset Text: Division and inset of the cheek to nose interpolation flap was performed to achieve optimal aesthetic result, restore normal anatomic appearance and avoid distortion of normal anatomy, expedite and facilitate wound healing, achieve optimal functional result and because linear closure either not possible or would produce suboptimal result. The patient was prepped and draped in the usual manner. The pedicle was infiltrated with local anesthesia. The pedicle was sectioned with a #15 blade. The pedicle was de-bulked and trimmed to match the shape of the defect. Hemostasis was achieved. The flap donor site and free margin of the flap were secured with deep buried sutures and the wound edges were re-approximated.

## 2025-01-06 ENCOUNTER — OFFICE VISIT (OUTPATIENT)
Facility: CLINIC | Age: 55
End: 2025-01-06
Payer: COMMERCIAL

## 2025-01-06 VITALS — DIASTOLIC BLOOD PRESSURE: 93 MMHG | HEART RATE: 93 BPM | SYSTOLIC BLOOD PRESSURE: 130 MMHG

## 2025-01-06 DIAGNOSIS — G44.86 CERVICOGENIC HEADACHE: ICD-10-CM

## 2025-01-06 DIAGNOSIS — S13.4XXA WHIPLASH INJURY TO NECK, INITIAL ENCOUNTER: ICD-10-CM

## 2025-01-06 DIAGNOSIS — F34.89 OTHER SPECIFIED PERSISTENT MOOD DISORDERS: ICD-10-CM

## 2025-01-06 DIAGNOSIS — R53.83 FATIGUE DUE TO SLEEP PATTERN DISTURBANCE: ICD-10-CM

## 2025-01-06 DIAGNOSIS — M54.2 CERVICALGIA OF OCCIPITO-ATLANTO-AXIAL REGION: ICD-10-CM

## 2025-01-06 DIAGNOSIS — H51.11 CONVERGENCE INSUFFICIENCY: ICD-10-CM

## 2025-01-06 DIAGNOSIS — R41.89 COGNITIVE CHANGE: ICD-10-CM

## 2025-01-06 DIAGNOSIS — G47.9 FATIGUE DUE TO SLEEP PATTERN DISTURBANCE: ICD-10-CM

## 2025-01-06 DIAGNOSIS — M54.81 OCCIPITAL NEURITIS: ICD-10-CM

## 2025-01-06 DIAGNOSIS — S06.0X9S CONCUSSION WITH LOSS OF CONSCIOUSNESS, SEQUELA: Primary | ICD-10-CM

## 2025-01-06 PROBLEM — S06.0X9A CONCUSSION WITH LOSS OF CONSCIOUSNESS: Status: ACTIVE | Noted: 2025-01-06

## 2025-01-06 PROCEDURE — 99999 PR PBB SHADOW E&M-EST. PATIENT-LVL IV: CPT | Mod: PBBFAC,,, | Performed by: PSYCHIATRY & NEUROLOGY

## 2025-01-06 PROCEDURE — 99205 OFFICE O/P NEW HI 60 MIN: CPT | Mod: S$GLB,,, | Performed by: PSYCHIATRY & NEUROLOGY

## 2025-01-06 PROCEDURE — G2211 COMPLEX E/M VISIT ADD ON: HCPCS | Mod: S$GLB,,, | Performed by: PSYCHIATRY & NEUROLOGY

## 2025-01-06 RX ORDER — PREDNISONE 10 MG/1
TABLET ORAL
Qty: 40 TABLET | Refills: 0 | Status: SHIPPED | OUTPATIENT
Start: 2025-01-06 | End: 2025-01-11

## 2025-01-06 NOTE — PATIENT INSTRUCTIONS
5 day prednisone taper prescribed: 100 mg (10 tabs), 90 mg (9 tabs), 80 mg (8 tabs), 70 mg (7 tabs), 60 mg (6 tabs). Split up doses with meals. Day 1: Take 4 tabs with breakfast, 3 tabs with lunch, 3 tabs with dinner. Day 2: Take 3 tabs with each meal. Day 3: Take 3 tabs with breakfast, 3 tabs with lunch, 2 tabs with dinner. Day 4: Take 3 tabs with breakfast, 2 tabs with lunch and dinner. Day 5: Take 2 tabs with each meal  The patient was instructed to ice the occipital region for no more than 20 minutes at least once a day but may repeat this as many times as they would like.  Discussed ergonomic accommodations for occipital neuritis/neuralgia. Mainly perform all work at eye level to minimize continued neck flexion which will aggravate the nerve.  Patient was encouraged to do daily light cardio exercise but instructed to limit physical activities to walking, walking in water up to the waist only or riding a stationary bike, recumbent preferred. No weight lifting in upper body, no neck massage, no acupuncture of neck, and no dry needling of upper neck. No neck PT unless otherwise stated. If neck PT is recommended, the therapist may do joint manipulation at this time but no suboccipital soft tissue therapy. Any of your therapists may also do passive neck range of motion activities. No chiropractor work on neck. Lower body strengthening with resistant bands, leg machines, and strapping weights to legs okay. No core body workouts. No running or use of cardio equipment other than stationary bike. No swimming or body surfing. No amusement park rides. No lifting more than 5-10 lbs and bend at the knees, not the waist.  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 To be referred for consultation for occipital nerve release procedure if initially clinically responsive to injections  but always with a return of symptoms.  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 Delays in recovery may cause or further worsen any underlying permanent injury that was caused by the result of the patient's injury. Delays in recovery may also cause the development of new medical conditions that will then need to be treated. The development of some forms of permanent injuries may result in nerve injuries that are refractory to initial treatment, which could result in further medical expenses as more expensive methods of treatment or prolonged treatment may be needed to treat the underlying injury. In my collective clinical experience, my care plan as documented from the initial note leads to significant recovery from injuries similar to the patient's in the majority of my other patients. Delays in recovery will prevent the patient from returning to work full time in a timely fashion. Delays in recovery caused by workers compensation for patients with similar injury have also lead to significant financial settlements for the patients should a legal dispute arise. I have counseled the patient on all of the above.  Please be advised that I do not determine MMI and will update in the assessment whether patient is improving or not, or has plateaued to the point of permanent symptoms despite successfully  following recommended treatment by myself  Please be advised that the above work status is re-evaluated each visit and that the nature of the patient's injury described in the assessment and diagnoses do not have a standard or expected timeline for recovery as demonstrated in multiple up to date peer-reviewed publications  Please be advised that I do not perform FCEs. I will also not answer or comment on any FCE questions.  Please be advised I will not fill out additional paperwork that asks me to restate diagnoses, plan of care, work status, and/or accommodations as these are all documented in my clinic note.  Please be advised I will not fill out paperwork asking me to agree with a workers compensation representative's interpretation of my plan of care and/or evaluation of the patient as I stand by what is documented in my clinic note.  Please be advised that any peer to peer requests made on the behalf of workers compensation will need to be scheduled based on my availability and will be completed by myself within 1 week of the request. My clinic is not capable of doing on demand peer to peer requests with less than 48 hours of notice.
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
## 2025-01-14 ENCOUNTER — OFFICE VISIT (OUTPATIENT)
Dept: SPORTS MEDICINE | Facility: CLINIC | Age: 55
End: 2025-01-14
Payer: COMMERCIAL

## 2025-01-14 VITALS — SYSTOLIC BLOOD PRESSURE: 142 MMHG | HEART RATE: 91 BPM | DIASTOLIC BLOOD PRESSURE: 84 MMHG
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
## 2025-01-14 DIAGNOSIS — G89.11 ACUTE PAIN OF LEFT SHOULDER DUE TO TRAUMA: Primary | ICD-10-CM

## 2025-01-14 DIAGNOSIS — M25.512 ACUTE PAIN OF LEFT SHOULDER DUE TO TRAUMA: Primary | ICD-10-CM

## 2025-01-14 DIAGNOSIS — M54.10 RADICULOPATHY AFFECTING UPPER EXTREMITY: ICD-10-CM

## 2025-01-14 PROCEDURE — 99203 OFFICE O/P NEW LOW 30 MIN: CPT | Mod: S$GLB,,, | Performed by: ORTHOPAEDIC SURGERY

## 2025-01-14 PROCEDURE — 99999 PR PBB SHADOW E&M-EST. PATIENT-LVL III: CPT | Mod: PBBFAC,,, | Performed by: ORTHOPAEDIC SURGERY

## 2025-01-14 NOTE — PROGRESS NOTES
CC: LEFT shoulder pain     54 y.o. Female with a  history of left shoulder traumatic pain while at work in 2024. Patient reports no pain before this injury. She reports a fall while going up stairs to the left shoulder and cervical spine.  Patient works for [email or web address]. Patient reports she has not been seen for cervical spine.   She has attended PT with minimal relief. PT was discontinued due to authorization. Of note patient is still recovering from concussion symptoms.     She reports that the pain and weakness is worse with overhead activity. It also bothers her at night.    Is affecting ADLs.  Pain is 8/10 at it's worst.      Past Medical History:   Diagnosis Date    Anxiety     Depression     Eczema     Hypertension        Past Surgical History:   Procedure Laterality Date     SECTION          TONSILLECTOMY         Family History   Problem Relation Name Age of Onset    Hypertension Mother      Kidney failure Mother      Heart disease Father      Pacemaker/defibrilator Father      Multiple sclerosis Father      No Known Problems Brother      No Known Problems Brother      No Known Problems Daughter           Current Outpatient Medications:     amLODIPine (NORVASC) 10 MG tablet, Take 5 mg by mouth., Disp: , Rfl:     hydroCHLOROthiazide (HYDRODIURIL) 25 MG tablet, Take 25 mg by mouth., Disp: , Rfl:     lisinopriL 10 MG tablet, Take 4 tablets by mouth once daily., Disp: , Rfl:     meloxicam (MOBIC) 15 MG tablet, Take 1 tablet (15 mg total) by mouth once daily. (Patient not taking: Reported on 2025), Disp: 30 tablet, Rfl: 0    mupirocin (BACTROBAN) 2 % ointment, Apply to affected area 3 times daily (Patient not taking: Reported on 2025), Disp: 22 g, Rfl: 1    semaglutide, weight loss, (WEGOVY) 2.4 mg/0.75 mL PnIj, Inject 2.4 mg (one pen) into the skin every 7 days. (Patient not taking: Reported on 2025), Disp: 3 mL, Rfl: 2    tiZANidine (ZANAFLEX) 4 MG tablet, Take 1  tablet (4 mg total) by mouth 3 (three) times daily. (Patient not taking: Reported on 1/6/2025), Disp: 20 tablet, Rfl: 0    topiramate (TOPAMAX) 25 MG tablet, Take 1 tablet (25 mg total) by mouth 2 (two) times daily. (Patient not taking: Reported on 1/6/2025), Disp: 60 tablet, Rfl: 3    triamcinolone acetonide 0.1% (KENALOG) 0.1 % ointment, Apply topically 2 (two) times daily. (Patient not taking: Reported on 1/6/2025), Disp: 80 g, Rfl: 11    triazolam (HALCION) 0.25 MG Tab, Take 0.25 mg by mouth nightly. (Patient not taking: Reported on 1/6/2025), Disp: , Rfl:     Review of patient's allergies indicates:  No Known Allergies       REVIEW OF SYSTEMS:  Constitution: Negative. Negative for chills, fever and night sweats.   HENT: Negative for congestion and headaches.    Eyes: Negative for blurred vision, left vision loss and right vision loss.   Cardiovascular: Negative for chest pain and syncope.   Respiratory: Negative for cough and shortness of breath.    Endocrine: Negative for polydipsia, polyphagia and polyuria.   Hematologic/Lymphatic: Negative for bleeding problem. Does not bruise/bleed easily.   Skin: Negative for dry skin, itching and rash.   Musculoskeletal: Negative for falls.  Positive for left shoulder pain and muscle weakness.   Gastrointestinal: Negative for abdominal pain and bowel incontinence.   Genitourinary: Negative for bladder incontinence and nocturia.   Neurological: Negative for disturbances in coordination, loss of balance and seizures.   Psychiatric/Behavioral: Negative for depression. The patient does not have insomnia.    Allergic/Immunologic: Negative for hives and persistent infections.      PHYSICAL EXAMINATION:  Vitals:  BP (!) 142/84   Pulse 91   LMP 10/24/2024 (Approximate)    General: The patient is alert and oriented x 3.  Mood is pleasant.  Observation of ears, eyes and nose reveal no gross abnormalities.  No labored breathing observed.  Gait is coordinated. Patient can toe walk  and heel walk without difficulty.      LEFT Shoulder / Upper Extremity Exam    OBSERVATION:     Swelling  none  Deformity  none   Discoloration  none   Scapular winging none   Scars   none  Atrophy  none    TENDERNESS / CREPITUS (T/C):          T/C      T/C   Clavicle   -/-  SUPRAspinatus    -/-     AC Jt.    +/-  INFRAspinatus  -/-    SC Jt.    -/-  Deltoid    -/-      G. Tuberosity  -/-  LH BICEP groove  +/-   Acromion:  -/-  Midline Neck   -/-     Scapular Spine -/-  Trapezium   -/-   SMA Scapula  -/-  GH jt. line - post  -/-     Scapulothoracic  -/-         ROM: (* = with pain)  Right shoulder   Left shoulder        AROM (PROM)   AROM (PROM)   FE    170° (175°)     100°* (115°*)     ER at 0°    60°  (65°)    40°*  (45°*)   ER at 90° ABD  90°  (90°)    90°  (90°)   IR at 90°  ABD   NA  (40°)     NA  (40°)      IR (spine level)   T10     T10    STRENGTH: (* = with pain) Right shoulder   Left shoulder    SCAPTION   5/5    4/5    IR    5/5    4/5   ER    5/5    4/5   BICEPS   5/5    4/5   Deltoid    5/5    4/5     SIGNS:  Painful side       NEER   +   OCARLOSS  +    XAVIER   +    SPEEDS  neg     DROP ARM   -   BELLY PRESS neg   Superior escape none    LIFT-OFF  neg   X-Body ADD    neg    MOVING VALGUS neg        STABILITY TESTING    Right shoulder   Left shoulder    Translation     Anterior  up face     up face    Posterior  up face    up face    Sulcus   < 10mm    < 10 mm     Signs   Apprehension   neg      neg       Relocation   no change     no change      Jerk test  neg     neg    EXTREMITY NEURO-VASCULAR EXAM:    Sensation grossly intact to light touch all dermatomal regions.    DTR 2+ Biceps, Triceps, BR and Negative Tushars sign   Grossly intact motor function at Elbow, Wrist and Hand   Distal pulses radial and ulnar 2+, brisk cap refill, symmetric.      NECK:  Painless FROM and spinous processes non-tender. Negative Spurlings sign.      OTHER FINDINGS:      IMAGING:    Left shoulder MRI -    IMPRESSION   1. Acromioclavicular osteoarthrosis with findings of subacromial impingement with subacromial subdeltoid bursitis.   2. Supraspinatus tendinosis with acute full-thickness full width tear mid fibers with tendon retraction.  Infraspinatus tendinosis with acute partial thickness partial width moderate grade articular surface tear anterior fibers.    3. Glenohumeral osteoarthrosis with joint effusion.   4. Biceps tenosynovitis.   5. Superior glenoid labral tear.   6. Linear stellate nondisplaced fracture/microfracture pattern of bone marrow edema and contusion, bone bruise in the humeral head and neck.       Cervical spine MRI -  IMPRESSION  1.  Straightening of the cervical spine with degenerative disc changes seen at C4-C5 and C5-C6.    2.  Mild cervical stenosis C4-C5 and C5-C6 with findings as discussed above.    3.  No significant foraminal restriction evident in the cervical region.       ASSESSMENT:   Left shoulder pain, possible:  1. Acute pain of left shoulder due to trauma    2. Radiculopathy affecting upper extremity        PLAN:      1. PT at [facility]    2. F/u in 6 - 8 weeks.     All questions were answered, patient will contact us for questions or concerns in the interim.

## 2025-01-16 ENCOUNTER — TELEPHONE (OUTPATIENT)
Dept: URGENT CARE | Facility: CLINIC | Age: 55
End: 2025-01-16
Payer: COMMERCIAL

## 2025-01-16 NOTE — TELEPHONE ENCOUNTER
Called the patient in reference to her missed [facility] appointment and the patient verbalized  the new [facility]  Appt, date & time. AFG

## 2025-01-17 ENCOUNTER — OFFICE VISIT (OUTPATIENT)
Dept: URGENT CARE | Facility: CLINIC | Age: 55
End: 2025-01-17
Payer: COMMERCIAL

## 2025-01-17 VITALS
WEIGHT: 218.25 LBS | DIASTOLIC BLOOD PRESSURE: 86 MMHG | HEIGHT: 63 IN | HEART RATE: 81 BPM | SYSTOLIC BLOOD PRESSURE: 129 MMHG | TEMPERATURE: 98 F | RESPIRATION RATE: 20 BRPM | OXYGEN SATURATION: 98 % | BODY MASS INDEX: 38.67 KG/M2

## 2025-01-17 DIAGNOSIS — M25.512 CHRONIC LEFT SHOULDER PAIN: ICD-10-CM

## 2025-01-17 DIAGNOSIS — S46.812S TRAUMATIC RUPTURE OF SUPRASPINATUS TENDON OF LEFT SHOULDER, SEQUELA: ICD-10-CM

## 2025-01-17 DIAGNOSIS — S06.0X0S CONCUSSION WITHOUT LOSS OF CONSCIOUSNESS, SEQUELA: Primary | ICD-10-CM

## 2025-01-17 DIAGNOSIS — S39.012D ACUTE MYOFASCIAL STRAIN OF LUMBAR REGION, SUBSEQUENT ENCOUNTER: ICD-10-CM
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
## 2025-01-17 DIAGNOSIS — M75.22 BICEPS TENDONITIS ON LEFT: ICD-10-CM

## 2025-01-17 DIAGNOSIS — Z02.6 ENCOUNTER RELATED TO WORKER'S COMPENSATION CLAIM: ICD-10-CM

## 2025-01-17 DIAGNOSIS — S42.292S: ICD-10-CM

## 2025-01-17 DIAGNOSIS — G89.29 CHRONIC LEFT SHOULDER PAIN: ICD-10-CM

## 2025-01-17 DIAGNOSIS — M54.2 CERVICALGIA: ICD-10-CM

## 2025-01-17 PROCEDURE — 99214 OFFICE O/P EST MOD 30 MIN: CPT | Mod: S$GLB,,, | Performed by: PHYSICIAN ASSISTANT

## 2025-01-17 NOTE — LETTER
[facility] [address] [address]  Phone: [phone]  Fax: [phone]  [facility]: [email or web address]    Pt Name: Lizabeth Gomez  Injury Date: 09/19/2024   Employee ID: [id number] Date of Treatment: 01/17/2025   Company: [employer]      Appointment Time: 10:15 AM Arrived: 11:34 AM   Provider: Moreno Zafar PA-C Time Out: 1:00 PM     Office Treatment:   1. Concussion without loss of consciousness, sequela    2. Encounter related to worker's compensation claim    3. Cervicalgia    4. Traumatic rupture of supraspinatus tendon of left shoulder, sequela    5. Biceps tendonitis on left    6. Fracture of humeral head, closed, left, sequela    7. Work related injury    8. Acute myofascial strain of lumbar region, subsequent encounter    9. Chronic left shoulder pain          Patient Instructions: Begin Physical Therapy (Vestibular physical therapy authorized, speech therapy authorized, physical therapy shoulder pending authorization.  Start prednisone.  Hold meloxicam while taking prednisone.  Restart meloxicam once prednisone completed.)      Restrictions: Limited use of left hand and arm, No above the shoulder/overhead work, Sit down work only (Allow To take frequent breaks 10-15 minutes/hour)     Virtual Appointment: 2/28/2025 at 9:00 AM    TERE

## 2025-01-17 NOTE — PROGRESS NOTES
Subjective:      Patient ID: Lizabeth Gomez is a 54 y.o. female.    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  Patient suffered a concussion and was seen by Neurology recently.  Neurology started her on prednisone and referred her to speech therapy and physical therapy for vestibular training.  She has not started her prednisone yet.  She continues to have headaches, neck pain, left shoulder pain.  She takes meloxicam with improvement.  Patient was also seen by orthopedics for her left shoulder.  Ortho ordered physical therapy.  She has yet to begin speech therapy and physical therapy for vestibular and left shoulder. MEB    Headache   Associated symptoms include back pain, dizziness, neck pain, numbness and photophobia. Pertinent negatives include no blurred vision, nausea or vomiting. There is no history of migraine headaches.       Constitution: Positive for activity change.   HENT:  Positive for facial trauma.         Phonophobia   Neck: Positive for neck pain. Negative for neck stiffness.   Cardiovascular:  Negative for chest trauma.   Eyes:  Positive for photophobia. Negative for vision loss, double vision and blurred vision.   Respiratory:  Negative for shortness of breath.    Gastrointestinal:  Negative for nausea and vomiting.   Musculoskeletal:  Positive for pain, trauma, joint pain, abnormal ROM of joint and back pain.   Skin:  Negative for wound.   Neurological:  Positive for dizziness, headaches, numbness and tingling. Negative for history of migraines.   Psychiatric/Behavioral:  Positive  for sleep disturbance. Negative for history of mental illness.      Objective:     Physical Exam  Vitals and nursing note reviewed.   Constitutional:       General: She is not in acute distress.     Appearance: Normal appearance. She is well-developed. She is not ill-appearing, toxic-appearing or diaphoretic.   HENT:      Head: Normocephalic and atraumatic.      Jaw: No trismus or pain on movement.      Right Ear: Hearing, tympanic membrane, ear canal and external ear normal.      Left Ear: Hearing, tympanic membrane, ear canal and external ear normal.      Nose: Nose normal. No nasal deformity, mucosal edema or rhinorrhea.      Right Sinus: No maxillary sinus tenderness or frontal sinus tenderness.      Left Sinus: No maxillary sinus tenderness or frontal sinus tenderness.      Mouth/Throat:      Dentition: Normal dentition.      Pharynx: Uvula midline. No posterior oropharyngeal erythema or uvula swelling.   Eyes:      General: Lids are normal. Vision grossly intact. No scleral icterus.     Extraocular Movements: Extraocular movements intact.      Conjunctiva/sclera: Conjunctivae normal.      Pupils: Pupils are equal, round, and reactive to light.      Comments: Sclera clear bilat   Neck:      Trachea: Trachea normal.   Cardiovascular:      Rate and Rhythm: Normal rate and regular rhythm.      Pulses: Normal pulses.      Heart sounds: Normal heart sounds.   Pulmonary:      Effort: Pulmonary effort is normal. No respiratory distress.      Breath sounds: Normal breath sounds.   Musculoskeletal:         General: No deformity.      Right shoulder: Normal pulse.      Left shoulder: Tenderness present. No deformity. Decreased range of motion. Decreased strength. Normal pulse.        Arms:       Cervical back: Neck supple. Spasms and tenderness present. No swelling, deformity or rigidity. Pain with movement present. Decreased range of motion.      Thoracic back: No tenderness.      Lumbar back: Tenderness present.  Decreased range of motion. Negative right straight leg raise test and negative left straight leg raise test.        Back:       Comments: Left shoulder demonstrates markedly reduced range of motion.  Active range of motion: Flexion 80°, abduction 45°, unable to determine internal rotation as patient can not put her hand behind her back.       Skin:     General: Skin is warm and dry.      Coloration: Skin is not pale.   Neurological:      General: No focal deficit present.      Mental Status: She is alert and oriented to person, place, and time. She is not disoriented.      Cranial Nerves: Cranial nerves 2-12 are intact. No cranial nerve deficit.      Sensory: Sensation is intact.      Motor: Motor function is intact. No weakness or abnormal muscle tone.      Coordination: Coordination is intact. Romberg sign negative. Coordination normal. Finger-Nose-Finger Test normal. Rapid alternating movements normal.      Gait: Gait is intact. Gait and tandem walk normal.      Deep Tendon Reflexes:      Reflex Scores:       Tricep reflexes are 1+ on the right side and 1+ on the left side.       Bicep reflexes are 2+ on the right side and 2+ on the left side.       Brachioradialis reflexes are 2+ on the right side and 2+ on the left side.       Patellar reflexes are 2+ on the right side and 2+ on the left side.       Achilles reflexes are 2+ on the right side and 2+ on the left side.  Psychiatric:         Attention and Perception: Attention normal.         Mood and Affect: Mood normal.         Speech: Speech normal.         Behavior: Behavior normal. Behavior is cooperative.        Assessment:      1. Concussion without loss of consciousness, sequela    2. Encounter related to worker's compensation claim    3. Cervicalgia    4. Traumatic rupture of supraspinatus tendon of left shoulder, sequela    5. Biceps tendonitis on left    6. Fracture of humeral head, closed, left, sequela    7. Work related injury    8. Acute myofascial  strain of lumbar region, subsequent encounter    9. Chronic left shoulder pain      Plan:     Patient is being managed by Neurology for her concussion and occipital neuralgia.  Advised to take prednisone.  Hold meloxicam until prednisone completed.  Patient is having difficulty managing all of her referrals.  I will request a nurse  to assist.  Patient was referred to speech therapy and physical therapy for vestibular training by Neurology.  She was also referred to physical therapy for her left shoulder by orthopedics.  I referred her to pain management due to neck pain.  This is likely due to occipital neuritis diagnosed by neurologist.  I will cancel pain management referral.  Consider new referral if neck pain does not improve with conservative treatment.  Patient will continue light duty.  Will see patient on a virtual visit in 6 weeks for follow-up.  She may return to clinic sooner as needed.  Patient verbalized understanding and agreement.     Patient Instructions: Begin Physical Therapy (Vestibular physical therapy authorized, speech therapy authorized, physical therapy shoulder pending authorization.  Start prednisone.  Hold meloxicam while taking prednisone.  Restart meloxicam once prednisone completed.)   Restrictions: Limited use of left hand and arm, No above the shoulder/overhead work, Sit down work only (Allow To take frequent breaks 10-15 minutes/hour)  Follow up in about 6 weeks (around 2/28/2025) for Reassessment.

## 2025-02-03 ENCOUNTER — CLINICAL SUPPORT (OUTPATIENT)
Dept: REHABILITATION | Facility: HOSPITAL | Age: 55
End: 2025-02-03
Attending: PSYCHIATRY & NEUROLOGY
Payer: COMMERCIAL

## 2025-02-03 DIAGNOSIS — S06.0X9D CONCUSSION WITH LOSS OF CONSCIOUSNESS, SUBSEQUENT ENCOUNTER: Primary | ICD-10-CM

## 2025-02-03 PROCEDURE — 97112 NEUROMUSCULAR REEDUCATION: CPT | Mod: PO

## 2025-02-03 PROCEDURE — 97161 PT EVAL LOW COMPLEX 20 MIN: CPT | Mod: PO

## 2025-02-04 NOTE — PROGRESS NOTES
"  Outpatient Rehab    Physical Therapy Evaluation    Patient Name: Lizabeth Gomez  MRN: [id number]  YOB: 1970  Today's Date: 2025    Therapy Diagnosis:   Encounter Diagnosis   Name Primary?    Concussion with loss of consciousness, subsequent encounter Yes     Physician: Krzysztof Mayorga MD    Physician Orders: Eval and Treat  Medical Diagnosis:   S06.0X9S (ICD-10-CM) - Concussion with loss of consciousness, sequela   H51.11 (ICD-10-CM) - Convergence insufficiency       Visit # / Visits Authorized:     Date of Evaluation:  2/3/2025   Insurance Authorization Period: 25 to 26  Plan of Care Certification:  2/3/2025 to 25      Time In:   16:15  Time Out:  17:05  Total Time:   50  Total Billable Time: 50 min    Precautions     standard      Subjective   History of Present Illness  Lizabeth is a 54 y.o. female  According to the patient's chart, Lizabeth has a past medical history of Anxiety, Depression, Eczema, and Hypertension. Lizabeth has a past surgical history that includes Tonsillectomy () and  section ().                History of Present Condition/Illness: In September she tripped on a step going into work and hit the left of her head.  She tried to work that day, but was "fading out".  She works as Forensic instructor, doing presentations and labs.  Had xray and had left upper extremity fracture and she was doing rehab for her left arm.  Having more trouble at work due to cognitive.  Dizziness has subsided since injury, moving too quickly.     Activities of Daily Living  Social history was obtained from Patient.          Patient Roles: Caregiver for child    Previously independent with activities of daily living? Yes     Currently independent with activities of daily living? Yes              Pain     Patient reports a current pain level of 0/10. Pain at best is reported as 0/10. Pain at worst is reported as 8/10.   Location: headache  Pain-Aggravating Factors: " Stress, Computer work, Movement         Treatment History  Treatments  Discharged From Past 30 Days: Outpatient therapy    Living Arrangements  Living Situation  Housing: Home independently  Living Arrangements: Family members  Support Systems: Children    Home Setup  Type of Structure: House  Home Access: Stairs with rails  Entrance Stairs - Number of Steps: 6  Primary Bedroom: 1st floor        Employment  Patient reports: Does the patient's condition impact their ability to work?  Employment Status: Employed full-time   Works as forensic instructor, currently on light duty       Past Medical History/Physical Systems Review:   Lizabeth Gomez  has a past medical history of Anxiety, Depression, Eczema, and Hypertension.    Lizabeth Gomez  has a past surgical history that includes Tonsillectomy () and  section ().    Lizabeth has a current medication list which includes the following prescription(s): amlodipine, hydrochlorothiazide, lisinopril, meloxicam, mupirocin, wegovy, tizanidine, topiramate, triamcinolone acetonide 0.1%, and triazolam.    Review of patient's allergies indicates:  No Known Allergies     Objective   Ocular Movement  Static Visual Fixation: Intact  Convergence: Impaired  Convergence Break Point (Distance from Nose in cm): 16 cm  Accommodation: Impaired  Accommodation Details: difficulty moving back to near target  Right Eye Ocular Range of Motion: Intact  Left Eye Ocular Range of Motion: Intact  Pursuits: Smooth and accurate  Horizontal Saccades: Intact  Vertical Saccades: Intact  Patient Symptoms/Response to Ocular Movement Testing: blurriness with smooth pursuit       Vision Special Tests  Snellen Chart: wears reading glasses         Subcranial Range of Motion   Active Restricted? Passive Restricted? Pain   Flexion         Protraction         Retraction           Cervical Range of Motion   Active (deg) Passive (deg) Pain   Flexion 25       Extension         Right Lateral  "Flexion         Right Rotation 60       Left Lateral Flexion         Left Rotation 50   Yes               Vestibulo-Ocular Reflex (VOR) Tests  Negative Head Thrust Impulse Test: Right and Left       Impaired: Vestibulo-Ocular Reflex (VOR) Cancellation/Suppression  Vestibulo-Ocular Reflex (VOR) Cancellation/Suppression Details: slowed + dizziness    Nystagmus and Associated Symptom Provocative Tests  Negative: Gaze-Evoked Nystagmus       Vestibular Positional and Balance Testing       Modified Clinical Test of Sensory Interaction and Balance (CTSIB-M):  Postural control: MCTSIB Evaluation 02/03/2025 (Average of 3 trials) 1. Eyes Open/feet together/Firm 30 seconds 2. Eyes Closed/feet together/Firm 30 seconds 3. Eyes Open/feet together/Foam 30 seconds 4. Eyes Closed/feet together/Foam 12 seconds TOTAL 102/120                Intake Outcome Measure for FOTO Survey    Therapist reviewed FOTO scores for Lizabeth Gomez on 2/3/2025.   FOTO report - see Media section or FOTO account episode details.     Intake Score: 46%    Treatment:  Balance/Neuromuscular Re-Education  Balance/Neuromuscular Re-Education Activity 1: Seated: VOR x 1 with large "B" target x 30 sec horizontal and x 30 sec vertical. Discussed purpose, benefits of vestibular rehabiliation following concussion.  Educated to perform walking program also discussed schedule mangaement.    Patient's spiritual, cultural, and educational needs considered and patient agreeable to plan of care and goals.     Assessment & Plan   Assessment  Lizabeth presents with a condition of Moderate complexity.   Presentation of Symptoms: Unpredictable       Functional Limitations: Functional mobility, Functional cognition, Sensory processing, Activity tolerance    Assessment Details: Ms. Gomez comes to physical therapy following concussion after a fall. She notes that since her injury she has experienced headaches, dizziness, instability and slowed processing.  She comes into " therapy session wearing glasses due to light sensitivity.  She demo's limited bilateral cervical rotation, flexion and extension range of motion.  She demo's normal oculomotor mobility but notes dizziness after completion of activity. Her convergence is 16cm which is abnormal.  She scored 102/120 on the mCTSIB indicating impaired static balance.  Unable to complete dynamic gait assessment secondary to time limitations. She was given VOR x 1 for HEP, she performed and demo's good understanding. She will benefit from skilled Physical Therapy to address impairments and maximize mobility.     Plan  From a physical therapy perspective, the patient would benefit from: Skilled Rehab Services    Planned therapy interventions include: Therapeutic exercise, Therapeutic activities, Neuromuscular re-education, Manual therapy, ADLs/IADLs, and Cognitive functional training.            Visit Frequency: 2 times Per Week for 8 Weeks.       This plan was discussed with Patient.   Discussion participants: Agreed Upon Plan of Care             Goals:   Active       Long term goals        Patient to improve FOTO by atleast 5 % for improved functional mobility        Start:  02/04/25    Expected End:  04/01/25            Patient to improve FGA to atleast 26/30 for improved dynamic balance and no fall risk       Start:  02/04/25    Expected End:  04/01/25            Patient to improve convergence to atleast 12 cm for improved reading       Start:  02/04/25    Expected End:  04/01/25            Patient able to perform at least 30 min of moderate aerobic activity with less than or equal to 1 point increase in symptoms for improved endurance       Start:  02/04/25    Expected End:  03/04/25               Short term goals       Patient to perform HEP Independent        Start:  02/04/25    Expected End:  03/04/25            Patient able to perform VOR x 1 at 90 bpm with less than or equal to 1 point increase in symptoms for improved vestibular  function        Start:  02/04/25    Expected End:  03/04/25            Patient to score greater than or equal to 112/120 on mCTSIB for improved static balance       Start:  02/04/25    Expected End:  03/04/25                Sherita Maldonado, PT

## 2025-02-10 ENCOUNTER — CLINICAL SUPPORT (OUTPATIENT)
Dept: REHABILITATION | Facility: HOSPITAL | Age: 55
End: 2025-02-10
Payer: COMMERCIAL

## 2025-02-10 DIAGNOSIS — S06.0X9D CONCUSSION WITH LOSS OF CONSCIOUSNESS, SUBSEQUENT ENCOUNTER: Primary | ICD-10-CM

## 2025-02-10 PROCEDURE — 97112 NEUROMUSCULAR REEDUCATION: CPT | Mod: PO

## 2025-02-10 NOTE — PROGRESS NOTES
Outpatient Rehab    Physical Therapy Visit    Patient Name: Lizabeth Gomez  MRN: [id number]  YOB: 1970  Today's Date: 2/10/2025    Therapy Diagnosis:   Encounter Diagnosis   Name Primary?    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  Was looking at yatch over the weekend and got really dizzy..  Pain reported as 7/10. head ache    Objective          Functional Gait Assessment:   1. Gait on level surface =  2   (3) Normal: less than 5.5 sec, no A.D., no imbalance, normal gait pattern, deviates< 6in   (2) Mild impairment: 7-5.6 sec, uses A.D., mild gait deviations, or deviates 6-10 in   (1) Moderate impairment: > 7 sec, slow speed, imbalance, deviates 10-15 in.   (0) Severe impairment: needs assist, deviates >15 in, reach/touch wall  2. Change in Gait Speed = 3   (3) Normal: smooth change w/o loss of balance or gait deviation, deviates < 6 in, significant difference between speeds   (2) Mild impairment: changes speed, but demonstrates mild gait deviations, deviates 6-10 in, OR no deviations but unable to significantly speed, OR uses A.D.   (1) Moderate impairment: minor changes to speed, OR changes speed w/ significant deviations, deviates 10-15 in, OR  Changes speed , but loses balance & recovers   (0) Severe impairment: cannot change speed, deviates >15 in, or loses balance & needs assist  3. Gait with  horizontal head turns  = 2   (3) Normal: no change in gait, deviates <6 in   (2) Mild impairment: slight change in speed, deviates 6-10 in, OR uses A.D.   (1) Moderate impairment: moderate change in speed, deviates 10-15 in   (0) Severe impairment: severe disruption of gait, deviates >15in  4. Gait with vertical head turns = 2   (3) Normal: no change in gait, deviates <6 in   (2) Mild impairment: slight change in speed, deviates 6-10 in OR uses A.D.   (1) Moderate impairment: moderate change in speed, deviates 10-15 in   (0) Severe impairment: severe disruption of gait, deviates >15 in  5. Gait with pivot turns = 3   (3) Normal: performs safely in 3 sec, no LOB   (2) Mild impairment: performs in >3 sec & no LOB, OR turns safely & requires several steps to regain LOB   (1) Moderate impairment: turns slow, OR requires several small steps for balance following turn & stop   (0) Severe impairment: cannot turn safely, needs assist  6. Step over obstacle = 3   (3) Normal: steps over 2 stacked boxes w/o change in speed or LOB   (2) Mild impairment: able to step over 1 box w/o change in speed or LOB   (1) Moderate impairment: steps over 1 box but must slow down, may require VC   (0) Severe impairment: cannot perform w/o assist  7. Gait with Narrow MELLISSA = 2   (3) Normal: 10 steps no staggering   (2) Mild impairment: 7-9 steps   (1) Moderate impairment: 4-7 steps   (0) Severe impairment: < 4 steps or cannot perform w/o assist  8. Gait with eyes closed = 2   (3) Normal: < 7 sec, no A.D., no LOB, normal gait pattern, deviates <6 in   (2) Mild impairment: 7.1-9 sec, mild gait deviations, deviates 6-10 in   (1) Moderate impairment: > 9 sec, abnormal pattern, LOB, deviates 10-15 in   (0) Severe impairment: cannot perform w/o assist, LOB, deviates >15in  9. Ambulating Backwards = 2   (3) Normal: no A.D., no LOB, normal gait pattern, deviates <6in   (2) Mild impairment: uses A.D., slower speed, mild gait deviations, deviates 6-10  "in   (1) Moderate impairment: slow speed, abnormal gait pattern, LOB, deviates 10-15 in   (0) Severe impairment: severe gait deviations or LOB, deviates >15in  10. Steps = 3   (3) Normal: alternating feet, no rail   (2) Mild Impairment: alternating feet, uses rail   (1) Moderate impairment: step-to, uses rail   (0) Severe impairment: cannot perform safely    Score 25/30     Score:   <22/30 fall risk   <20/30 fall risk in older adults   <18/30 fall risk in Parkinsons    Treatment:  Balance/Neuromuscular Re-Education  Balance/Neuromuscular Re-Education Activity 1: Seated: VOR x 1 with large "X" target 2 x 30 sec horizontal and 2  x 30 sec vertical +  Balance/Neuromuscular Re-Education Activity 2: Seated saccades with "X" targets 2 x 30 sec horizontal; 2 x 30 sec vertical  Balance/Neuromuscular Re-Education Activity 3: Convergence with "X" x 10, about 25 cm today  Balance/Neuromuscular Re-Education Activity 4: In parallel bars: 2 X 30 sec feet together head nods;  2 x 30 sec feet together head rotations; 2 x 30 sec feet together eyes closed  Balance/Neuromuscular Re-Education Activity 5: Standing VOR cancel 2 x 20 sec with "X" target, horizontal  Balance/Neuromuscular Re-Education Activity 6: 100 ft walking with head nods; 100 feet walking with head rotation    Assessment & Plan   Assessment: Ms. Gomez partipated well in todays session.  She had less symptoms with clinic lighting and did not have to wear sunglassess.  She was able to progress to static and dynamic motion tolerance activity with head turns.  She notes some dizziness and fatigue post esssion.  Added pencil push ups to HEP, she demo's understanding. She scored 25/30 on the FGA indicating impaired dynamic balance. She remains appropriate for skilled Physical Therapy.        Patient will continue to benefit from skilled outpatient physical therapy to address the deficits listed in the problem list box on initial evaluation, provide pt/family education " and to maximize pt's level of independence in the home and community environment.     Patient's spiritual, cultural, and educational needs considered and patient agreeable to plan of care and goals.           Plan:      Goals:   Active       Long term goals        Patient to improve FOTO by atleast 5 % for improved functional mobility        Start:  02/04/25    Expected End:  04/01/25            Patient to improve FGA to atleast 26/30 for improved dynamic balance and no fall risk       Start:  02/04/25    Expected End:  04/01/25            Patient to improve convergence to atleast 12 cm for improved reading       Start:  02/04/25    Expected End:  04/01/25            Patient able to perform at least 30 min of moderate aerobic activity with less than or equal to 1 point increase in symptoms for improved endurance       Start:  02/04/25    Expected End:  03/04/25               Short term goals       Patient to perform HEP Independent        Start:  02/04/25    Expected End:  03/04/25            Patient able to perform VOR x 1 at 90 bpm with less than or equal to 1 point increase in symptoms for improved vestibular function        Start:  02/04/25    Expected End:  03/04/25            Patient to score greater than or equal to 112/120 on mCTSIB for improved static balance       Start:  02/04/25    Expected End:  03/04/25                Sherita Maldonado, PT

## 2025-02-12 ENCOUNTER — OFFICE VISIT (OUTPATIENT)
Facility: CLINIC | Age: 55
End: 2025-02-12
Payer: COMMERCIAL

## 2025-02-12 DIAGNOSIS — G47.9 FATIGUE DUE TO SLEEP PATTERN DISTURBANCE: ICD-10-CM

## 2025-02-12 DIAGNOSIS — R26.89 IMBALANCE: ICD-10-CM

## 2025-02-12 DIAGNOSIS — R41.89 COGNITIVE CHANGE: ICD-10-CM

## 2025-02-12 DIAGNOSIS — G44.86 CERVICOGENIC HEADACHE: ICD-10-CM

## 2025-02-12 DIAGNOSIS — M54.81 OCCIPITAL NEURITIS: ICD-10-CM

## 2025-02-12 DIAGNOSIS — H51.11 CONVERGENCE INSUFFICIENCY: ICD-10-CM

## 2025-02-12 DIAGNOSIS — S13.4XXD WHIPLASH INJURY TO NECK, SUBSEQUENT ENCOUNTER: ICD-10-CM

## 2025-02-12 DIAGNOSIS — M54.2 CERVICALGIA OF OCCIPITO-ATLANTO-AXIAL REGION: ICD-10-CM

## 2025-02-12 DIAGNOSIS — R53.83 FATIGUE DUE TO SLEEP PATTERN DISTURBANCE: ICD-10-CM

## 2025-02-12 DIAGNOSIS — S06.0X9S CONCUSSION WITH LOSS OF CONSCIOUSNESS, SEQUELA: Primary | ICD-10-CM

## 2025-02-12 PROCEDURE — 99999 PR PBB SHADOW E&M-EST. PATIENT-LVL II: CPT | Mod: PBBFAC,,, | Performed by: PSYCHIATRY & NEUROLOGY

## 2025-02-12 RX ORDER — PREDNISONE 10 MG/1
TABLET ORAL
Qty: 40 TABLET | Refills: 0 | Status: SHIPPED | OUTPATIENT
Start: 2025-02-12 | End: 2025-02-17

## 2025-02-12 NOTE — LETTER
February 12, 2025      [facility]rology 7th Floor  [address]  [facility] [address]  Phone: [phone]  Fax: [phone]       Patient: Lizabeth Gomez   YOB: 1970  Date of Visit: 02/12/2025    To Whom It May Concern:    Salma Gomez  was at [facility] on 02/12/2025. The patient may return to work on 02/13/2025. If you have any questions or concerns, or if I can be of further assistance, please do not hesitate to contact me.    Sincerely,    Krzysztof Mayorga MD

## 2025-02-12 NOTE — PATIENT INSTRUCTIONS
5 day prednisone taper prescribed: 100 mg (10 tabs), 90 mg (9 tabs), 80 mg (8 tabs), 70 mg (7 tabs), 60 mg (6 tabs). Split up doses with meals. Day 1: Take 4 tabs with breakfast, 3 tabs with lunch, 3 tabs with dinner. Day 2: Take 3 tabs with each meal. Day 3: Take 3 tabs with breakfast, 3 tabs with lunch, 2 tabs with dinner. Day 4: Take 3 tabs with breakfast, 2 tabs with lunch and dinner. Day 5: Take 2 tabs with each meal  The patient was instructed to ice the occipital region for no more than 20 minutes at least once a day but may repeat this as many times as they would like.  Discussed ergonomic accommodations for occipital neuritis/neuralgia. Mainly perform all work at eye level to minimize continued neck flexion which will aggravate the nerve.  Patient was encouraged to do daily light cardio exercise but instructed to limit physical activities to walking, walking in water up to the waist only or riding a stationary bike, recumbent preferred. No weight lifting in upper body, no neck massage, no acupuncture of neck, and no dry needling of upper neck. No neck PT unless otherwise stated. If neck PT is recommended, the therapist may do joint manipulation at this time but no suboccipital soft tissue therapy. Any of your therapists may also do passive neck range of motion activities. No chiropractor work on neck. Lower body strengthening with resistant bands, leg machines, and strapping weights to legs okay. No core body workouts. No running or use of cardio equipment other than stationary bike. No swimming or body surfing. No amusement park rides. No lifting more than 5-10 lbs and bend at the knees, not the waist.  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 To be referred for consultation for occipital nerve release procedure if initially clinically responsive to injections  but always with a return of symptoms.  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 Delays in recovery may cause or further worsen any underlying permanent injury that was caused by the result of the patient's injury. Delays in recovery may also cause the development of new medical conditions that will then need to be treated. The development of some forms of permanent injuries may result in nerve injuries that are refractory to initial treatment, which could result in further medical expenses as more expensive methods of treatment or prolonged treatment may be needed to treat the underlying injury. In my collective clinical experience, my care plan as documented from the initial note leads to significant recovery from injuries similar to the patient's in the majority of my other patients. Delays in recovery will prevent the patient from returning to work full time in a timely fashion. Delays in recovery caused by workers compensation for patients with similar injury have also lead to significant financial settlements for the patients should a legal dispute arise. I have counseled the patient on all of the above.  Please be advised that I do not determine MMI and will update in the assessment whether patient is improving or not, or has plateaued to the point of permanent symptoms despite  successfully following recommended treatment by myself  Please be advised that the above work status is re-evaluated each visit and that the nature of the patient's injury described in the assessment and diagnoses do not have a standard or expected timeline for recovery as demonstrated in multiple up to date peer-reviewed publications  Please be advised that I do not perform FCEs. I will also not answer or comment on any FCE questions.  Please be advised I will not fill out additional paperwork that asks me to restate diagnoses, plan of care, work status, and/or accommodations as these are all documented in my clinic note.  Please be advised I will not fill out paperwork asking me to agree with a workers compensation representative's interpretation of my plan of care and/or evaluation of the patient as I stand by what is documented in my clinic note.  Please be advised that any peer to peer requests made on the behalf of workers compensation will need to be scheduled based on my availability and will be completed by myself within 1 week of the request. My clinic is not capable of doing on demand peer to peer requests with less than 48 hours of notice.    <--- Click to Launch ICDx for PreOp, PostOp and Procedure
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
## 2025-02-21 ENCOUNTER — CLINICAL SUPPORT (OUTPATIENT)
Dept: REHABILITATION | Facility: HOSPITAL | Age: 55
End: 2025-02-21
Payer: COMMERCIAL

## 2025-02-21 DIAGNOSIS — S06.0X9S CONCUSSION WITH LOSS OF CONSCIOUSNESS, SEQUELA: Primary | ICD-10-CM

## 2025-02-21 PROCEDURE — 97112 NEUROMUSCULAR REEDUCATION: CPT | Mod: PO

## 2025-02-21 NOTE — PROGRESS NOTES
"  Outpatient Rehab    Physical Therapy Visit    Patient Name: Lizabeth Gomez  MRN: [id number]  YOB: 1970  Today's Date: 2/21/2025    Therapy Diagnosis:   Encounter Diagnosis   Name Primary?    Concussion with loss of consciousness, sequela Yes     Physician: Krzysztof Mayorga MD    Physician Orders: Eval and Treat  Medical Diagnosis:   S06.0X9S (ICD-10-CM) - Concussion with loss of consciousness, sequela   H51.11 (ICD-10-CM) - Convergence insufficiency       Visit # / Visits Authorized:  3 / 12   Date of Evaluation:  02/03/2025  Insurance Authorization Period: 01/06/2026  Plan of Care Certification:  04/01/2025     Time In: 0805   Time Out: 0836  Total Time: 31   Total Billable Time: 31 minutes         Subjective   "Irriated" "I don't want to do too much" She has a headache at the base of her skull and has knee pain due to the change of weather..  Pain reported as 9/10. head ache    Objective            Treatment:  Balance/Neuromuscular Re-Education  Balance/Neuromuscular Re-Education Activity 1: Seated: VOR x 1 with large "X" target 3 x 30 sec horizontal and 3  x 30 sec vertical  Balance/Neuromuscular Re-Education Activity 3: Convergence with 14 pt "X" 3 x 5 reps  Balance/Neuromuscular Re-Education Activity 5: Airex Feet apart: Eyes open with horizontal head turns 2 x 30 - Eyes closed 2 x 30s  Balance/Neuromuscular Re-Education Activity 6: 2 x 100 feet walking with head rotation         Assessment & Plan   Assessment: Lizabeth tolerated today's session fairly despite reports of 9/10 headache and request to not do too much today. Session ended early as to not over-excarbate symptoms.       Patient will continue to benefit from skilled outpatient physical therapy to address the deficits listed in the problem list box on initial evaluation, provide pt/family education and to maximize pt's level of independence in the home and community environment.     Patient's spiritual, cultural, and educational " needs considered and patient agreeable to plan of care and goals.           Plan: Balance and habituation training.    Goals:   Active       Long term goals        Patient to improve FOTO by atleast 5 % for improved functional mobility        Start:  02/04/25    Expected End:  04/01/25            Patient to improve FGA to atleast 26/30 for improved dynamic balance and no fall risk       Start:  02/04/25    Expected End:  04/01/25            Patient to improve convergence to atleast 12 cm for improved reading       Start:  02/04/25    Expected End:  04/01/25            Patient able to perform at least 30 min of moderate aerobic activity with less than or equal to 1 point increase in symptoms for improved endurance       Start:  02/04/25    Expected End:  03/04/25               Short term goals       Patient to perform HEP Independent        Start:  02/04/25    Expected End:  03/04/25            Patient able to perform VOR x 1 at 90 bpm with less than or equal to 1 point increase in symptoms for improved vestibular function        Start:  02/04/25    Expected End:  03/04/25            Patient to score greater than or equal to 112/120 on mCTSIB for improved static balance       Start:  02/04/25    Expected End:  03/04/25                Summer Lamb PT

## 2025-02-25 ENCOUNTER — OFFICE VISIT (OUTPATIENT)
Dept: SPORTS MEDICINE | Facility: CLINIC | Age: 55
End: 2025-02-25
Payer: COMMERCIAL

## 2025-02-25 VITALS — BODY MASS INDEX: 38.67 KG/M2 | HEIGHT: 63 IN | WEIGHT: 218.25 LBS

## 2025-02-25 DIAGNOSIS — M67.912 ROTATOR CUFF DISORDER, LEFT: ICD-10-CM

## 2025-02-25 DIAGNOSIS — S43.432D TEAR OF LEFT GLENOID LABRUM, SUBSEQUENT ENCOUNTER: ICD-10-CM

## 2025-02-25 DIAGNOSIS — M19.012 ARTHROSIS OF LEFT ACROMIOCLAVICULAR JOINT: ICD-10-CM

## 2025-02-25 DIAGNOSIS — M75.22 BICEPS TENDINITIS OF LEFT UPPER EXTREMITY: ICD-10-CM

## 2025-02-25 DIAGNOSIS — G89.11 ACUTE PAIN OF LEFT SHOULDER DUE TO TRAUMA: Primary | ICD-10-CM
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
## 2025-02-25 DIAGNOSIS — M25.512 ACUTE PAIN OF LEFT SHOULDER DUE TO TRAUMA: Primary | ICD-10-CM

## 2025-02-25 PROBLEM — S43.432A TEAR OF LEFT GLENOID LABRUM: Status: ACTIVE | Noted: 2025-02-25

## 2025-02-25 PROCEDURE — 99214 OFFICE O/P EST MOD 30 MIN: CPT | Mod: S$GLB,,, | Performed by: ORTHOPAEDIC SURGERY

## 2025-02-25 PROCEDURE — 99999 PR PBB SHADOW E&M-EST. PATIENT-LVL II: CPT | Mod: PBBFAC,,, | Performed by: ORTHOPAEDIC SURGERY

## 2025-02-25 NOTE — PROGRESS NOTES
CC: LEFT shoulder pain    Patient is here for follow up. She has discontinued PT as she was doing PT for tinnitus. She is still in pain but it is better than her last visit. She has some restriction of shoulder movements but feels it is better with time.      54 y.o. Female with a  history of left shoulder traumatic pain while at work in 2024. Patient reports no pain before this injury. She reports a fall while going up stairs to the left shoulder and cervical spine.  Patient works for [employer] Patient reports she has not been seen for cervical spine.   She has attended PT with minimal relief. PT was discontinued due to authorization. Of note patient is still recovering from concussion symptoms.     She reports that the pain and weakness is worse with overhead activity. It also bothers her at night.    Is affecting ADLs.  Pain is 8/10 at it's worst.      Past Medical History:   Diagnosis Date    Anxiety     Arthrosis of left acromioclavicular joint 2025    Depression     Eczema     Hypertension        Past Surgical History:   Procedure Laterality Date     SECTION          TONSILLECTOMY         Family History   Problem Relation Name Age of Onset    Hypertension Mother      Kidney failure Mother      Heart disease Father      Pacemaker/defibrilator Father      Multiple sclerosis Father      No Known Problems Brother      No Known Problems Brother      No Known Problems Daughter           Current Outpatient Medications:     hydroCHLOROthiazide (HYDRODIURIL) 25 MG tablet, Take 25 mg by mouth., Disp: , Rfl:     meloxicam (MOBIC) 15 MG tablet, Take 1 tablet (15 mg total) by mouth once daily., Disp: 30 tablet, Rfl: 0    mupirocin (BACTROBAN) 2 % ointment, Apply to affected area 3 times daily, Disp: 22 g, Rfl: 1    tiZANidine (ZANAFLEX) 4 MG tablet, Take 1 tablet (4 mg total) by mouth 3 (three) times daily., Disp: 20 tablet, Rfl: 0    amLODIPine (NORVASC) 10 MG tablet, Take 5 mg  "by mouth., Disp: , Rfl:     lisinopriL 10 MG tablet, Take 4 tablets by mouth once daily., Disp: , Rfl:     topiramate (TOPAMAX) 25 MG tablet, Take 1 tablet (25 mg total) by mouth 2 (two) times daily. (Patient not taking: Reported on 1/6/2025), Disp: 60 tablet, Rfl: 3    Review of patient's allergies indicates:  No Known Allergies       REVIEW OF SYSTEMS:  Constitution: Negative. Negative for chills, fever and night sweats.   HENT: Negative for congestion and headaches.    Eyes: Negative for blurred vision, left vision loss and right vision loss.   Cardiovascular: Negative for chest pain and syncope.   Respiratory: Negative for cough and shortness of breath.    Endocrine: Negative for polydipsia, polyphagia and polyuria.   Hematologic/Lymphatic: Negative for bleeding problem. Does not bruise/bleed easily.   Skin: Negative for dry skin, itching and rash.   Musculoskeletal: Negative for falls.  Positive for left shoulder pain and muscle weakness.   Gastrointestinal: Negative for abdominal pain and bowel incontinence.   Genitourinary: Negative for bladder incontinence and nocturia.   Neurological: Negative for disturbances in coordination, loss of balance and seizures.   Psychiatric/Behavioral: Negative for depression. The patient does not have insomnia.    Allergic/Immunologic: Negative for hives and persistent infections.      PHYSICAL EXAMINATION:  Vitals:  Ht 5' 3" (1.6 m)   Wt 99 kg (218 lb 4.1 oz)   BMI 38.66 kg/m²    General: The patient is alert and oriented x 3.  Mood is pleasant.  Observation of ears, eyes and nose reveal no gross abnormalities.  No labored breathing observed.  Gait is coordinated. Patient can toe walk and heel walk without difficulty.      LEFT Shoulder / Upper Extremity Exam    OBSERVATION:     Swelling  none  Deformity  none   Discoloration  none   Scapular winging none   Scars   none  Atrophy  none    TENDERNESS / CREPITUS (T/C):          T/C      T/C   Clavicle   -/-  SUPRAspinatus  "   -/-     AC Jt.    +/-  INFRAspinatus  -/-    SC Jt.    -/-  Deltoid    -/-      G. Tuberosity  -/-  LH BICEP groove  +/-   Acromion:  -/-  Midline Neck   -/-     Scapular Spine -/-  Trapezium   -/-   SMA Scapula  -/-  GH jt. line - post  -/-     Scapulothoracic  -/-         ROM: (* = with pain)  Right shoulder   Left shoulder        AROM (PROM)   AROM (PROM)   FE    170° (175°)     100°* (115°*)     ER at 0°    60°  (65°)    40°*  (45°*)   ER at 90° ABD  90°  (90°)    90°  (90°)   IR at 90°  ABD   NA  (40°)     NA  (40°)      IR (spine level)   T10     T10    STRENGTH: (* = with pain) Right shoulder   Left shoulder    SCAPTION   5/5    4/5    IR    5/5    4/5   ER    5/5    4/5   BICEPS   5/5    4/5   Deltoid    5/5    4/5     SIGNS:  Painful side       NEER   +   OCARLOSS  +    XAVIER   +    SPEEDS  neg     DROP ARM   -   BELLY PRESS neg   Superior escape none    LIFT-OFF  neg   X-Body ADD    neg    MOVING VALGUS neg        STABILITY TESTING    Right shoulder   Left shoulder    Translation     Anterior  up face     up face    Posterior  up face    up face    Sulcus   < 10mm    < 10 mm     Signs   Apprehension   neg      neg       Relocation   no change     no change      Jerk test  neg     neg    EXTREMITY NEURO-VASCULAR EXAM:    Sensation grossly intact to light touch all dermatomal regions.    DTR 2+ Biceps, Triceps, BR and Negative Tushars sign   Grossly intact motor function at Elbow, Wrist and Hand   Distal pulses radial and ulnar 2+, brisk cap refill, symmetric.      NECK:  Painless FROM and spinous processes non-tender. Negative Spurlings sign.      OTHER FINDINGS:      IMAGING:    Left shoulder MRI -   IMPRESSION   1. Acromioclavicular osteoarthrosis with findings of subacromial impingement with subacromial subdeltoid bursitis.   2. Supraspinatus tendinosis with acute full-thickness full width tear mid fibers with tendon retraction.  Infraspinatus tendinosis with acute partial thickness partial  width moderate grade articular surface tear anterior fibers.    3. Glenohumeral osteoarthrosis with joint effusion.   4. Biceps tenosynovitis.   5. Superior glenoid labral tear.   6. Linear stellate nondisplaced fracture/microfracture pattern of bone marrow edema and contusion, bone bruise in the humeral head and neck.       Cervical spine MRI -  IMPRESSION  1.  Straightening of the cervical spine with degenerative disc changes seen at C4-C5 and C5-C6.    2.  Mild cervical stenosis C4-C5 and C5-C6 with findings as discussed above.    3.  No significant foraminal restriction evident in the cervical region.       ASSESSMENT:   Left shoulder pain, possible:  1. Acute pain of left shoulder due to trauma    2. Work related injury    3. Biceps tendinitis of left upper extremity    4. Rotator cuff disorder, left    5. Arthrosis of left acromioclavicular joint    6. Tear of left glenoid labrum, subsequent encounter          SHOULDER EXAM - left    Inspection:   Normal skin color and appearance with no scars.  No muscle atrophy noted.  No scapular winging.      Palpation: No lateral edge of the acromion, trapezius muscle or scapulothoracic bursa.  + tenderness of the acromioclavicular joint, biceps tendon    ROM:      PROM:     FE - 120°    Abd/ER -  40°  IR -  L5   Add/ER -  40°     AROM:    FE - 110°    Abd/ER -  40°  IR -  L5   Add/ER -  40°         Tests:     - Burrows, - Neer's, + Cross Arm Adduction, +[place], - [place], + Speed. - Belly Press,  + Jobes, - Lift Off    Stability: - sulcus, - apprehension, - relocation, - load and shift, - DLS      Motor:  Rotator cuff strength is 4/5 supraspinatus, 5/5 infraspinatus, 5/5 subscapularis. Biceps, triceps and deltoid strength is 5/5.      Neuro     Distally there are no paresthesias, and sensation is intact to light touch in the median, ulnar, and radial distributions.  Reflexes are 2/2 biceps, triceps and brachioradialis.      Plan:  Diagnosis and treatment plan explained  to the patient.  She is undergoing treatment for tinnitus and dizziness with neurology.   She will reviewed in clinic in 6-8 weeks (continue PT/home exercises).

## 2025-02-25 NOTE — PROGRESS NOTES
"Outpatient Rehab    Physical Therapy Visit    Patient Name: Lizabeth Gomez  MRN: [id number]  YOB: 1970  Today's Date: 2/27/2025    Therapy Diagnosis:   Encounter Diagnosis   Name Primary?    Concussion with loss of consciousness, subsequent encounter Yes       Physician: Krzysztof Mayorga MD    Physician Orders: Eval and Treat  Medical Diagnosis:   S06.0X9S (ICD-10-CM) - Concussion with loss of consciousness, sequela   H51.11 (ICD-10-CM) - Convergence insufficiency       Visit # / Visits Authorized:  3 / 12   Date of Evaluation:  02/03/2025  Insurance Authorization Period: 01/06/2026  Plan of Care Certification:  04/01/2025     Time In:   1115  Time Out:   1145   Total Time:   35 minutes  Total Billable Time: 35 minutes         Subjective   Pt reports she feels okay today..  Pain reported as 7/10. headache      Treatment:  Lizabeth received therapeutic exercises to develop strength, endurance, and core stabilization for 08 minutes including:   X 08 minutes SCI bike Level 2.0 for neural priming    Patient participated in neuromuscular re-education activities to improve: Balance, Coordination, and Sense for 27 minutes. The following activities were included:   Oculomotor:   3 x 30" VOR 1 horizontal - facing wall due to increased dizziness facing window  3 x 30" VOR 1 vertical - report of minimal dizziness  1 x 5 reps 14 pt "X" convergence - unable to complete due to symptoms     Hallway ambulation   2 x 100 feet ambulation horizontal head turns    Time above includes rest breaks    Patient participated in dynamic functional therapeutic activities to improve functional performance for 00 minutes. Including:             Assessment & Plan   Assessment: Pt tolerated treatment fairly, was limited by headache and dizziness symptoms. Pt began performing VOR 1 exercise facing window, but requesting to change posititons due to movement of cars increasing symptoms. Pt continuing to perform oculomotor exercsies " in different posittion and tolerating well. Pt unable to complete more than one set of convergence exercises due to increased symptoms. Added hallway walking with horzontal head turns which increased symptoms. Pt unwilling to perform hallway walking with vertical head turns. Added SCI stepper due to increased symptoms whcih was tolerated well. Pt requesting end session early due to not wanting to increase symptoms more because she is riding in [place] parade tonight.       Patient will continue to benefit from skilled outpatient physical therapy to address the deficits listed in the problem list box on initial evaluation, provide pt/family education and to maximize pt's level of independence in the home and community environment.     Patient's spiritual, cultural, and educational needs considered and patient agreeable to plan of care and goals.           Plan: Add vertical head turn ambulation, static balance activities, progress oculomotor activities as tolerated    Goals:   Active       Long term goals        Patient to improve FOTO by atleast 5 % for improved functional mobility  (Progressing)       Start:  02/04/25    Expected End:  04/01/25            Patient to improve FGA to atleast 26/30 for improved dynamic balance and no fall risk (Progressing)       Start:  02/04/25    Expected End:  04/01/25            Patient to improve convergence to atleast 12 cm for improved reading (Progressing)       Start:  02/04/25    Expected End:  04/01/25            Patient able to perform at least 30 min of moderate aerobic activity with less than or equal to 1 point increase in symptoms for improved endurance (Progressing)       Start:  02/04/25    Expected End:  03/04/25               Short term goals       Patient to perform HEP Independent  (Progressing)       Start:  02/04/25    Expected End:  03/04/25            Patient able to perform VOR x 1 at 90 bpm with less than or equal to 1 point increase in symptoms for  improved vestibular function  (Progressing)       Start:  02/04/25    Expected End:  03/04/25            Patient to score greater than or equal to 112/120 on mCTSIB for improved static balance (Progressing)       Start:  02/04/25    Expected End:  03/04/25                Akua Manzo, SPT    I certify that I was present in the room directing the student in service delivery and guiding them using my skilled judgment. As the co-signing therapist I have reviewed the students documentation and am responsible for the treatment, assessment, and plan.    Jahaira Schilling, PT, DPT, NCS

## 2025-02-25 NOTE — LETTER
Patient: Lizabeth Gomez   YOB: 1970   Clinic Number: [id number]   Today's Date: February 25, 2025        Certificate to Return to Work     Lizabeth Adamson was seen by Raphael Doe MD on 2/25/2025.    Please excuse her from work missed today.       If you have any questions or concerns, please feel free to contact the office at [phone].    Thank you.    Raphael Doe MD        Signature: __  ________________________________________________

## 2025-02-27 ENCOUNTER — CLINICAL SUPPORT (OUTPATIENT)
Dept: REHABILITATION | Facility: HOSPITAL | Age: 55
End: 2025-02-27
Payer: COMMERCIAL

## 2025-02-27 DIAGNOSIS — S06.0X9D CONCUSSION WITH LOSS OF CONSCIOUSNESS, SUBSEQUENT ENCOUNTER: Primary | ICD-10-CM

## 2025-02-27 PROCEDURE — 97112 NEUROMUSCULAR REEDUCATION: CPT | Mod: PO

## 2025-02-27 PROCEDURE — 97110 THERAPEUTIC EXERCISES: CPT | Mod: PO

## 2025-03-03 ENCOUNTER — TELEPHONE (OUTPATIENT)
Dept: URGENT CARE | Facility: CLINIC | Age: 55
End: 2025-03-03
Payer: COMMERCIAL

## 2025-03-03 NOTE — TELEPHONE ENCOUNTER
Called the patient in reference to her missed [facility] appointment and the patient verbalized  that she would like for me to give her a call back on Wednesday. She states that she's not in front of her Calendar and doesn't want to give me a date and It may be a date she already has something scheduled. RTC on 3/5/2025 OLVING

## 2025-03-06 ENCOUNTER — DOCUMENTATION ONLY (OUTPATIENT)
Dept: REHABILITATION | Facility: HOSPITAL | Age: 55
End: 2025-03-06
Payer: COMMERCIAL

## 2025-03-06 NOTE — PROGRESS NOTES
Documentation Only/ No Show    Patient: Lizabeth Gomez  Date of Session: 03/06/2025  MRN: [id number]  Lizabeth Gomez did not attend her scheduled physical therapy appointment today. Lizabeth Gomez did not call to cancel nor reschedule. Via telephone call initiated by  staff, patient reported that she did not attend because her knee hurts. No charges have been posted today.     Summer Lamb, PT  03/06/2025

## 2025-03-10 ENCOUNTER — CLINICAL SUPPORT (OUTPATIENT)
Dept: REHABILITATION | Facility: HOSPITAL | Age: 55
End: 2025-03-10
Payer: COMMERCIAL

## 2025-03-10 DIAGNOSIS — S06.0X9S CONCUSSION WITH LOSS OF CONSCIOUSNESS, SEQUELA: Primary | ICD-10-CM

## 2025-03-10 PROCEDURE — 97112 NEUROMUSCULAR REEDUCATION: CPT | Mod: PO

## 2025-03-10 NOTE — PROGRESS NOTES
Outpatient Rehab    Physical Therapy Progress Note    Patient Name: Lizabeth Gomez  MRN: [id number]  YOB: 1970  Encounter Date: 3/10/2025    Therapy Diagnosis:   Encounter Diagnosis   Name Primary?    Concussion with loss of consciousness, sequela Yes     Physician: Krzysztof Mayorga MD    Physician Orders: Eval and Treat  Medical Diagnosis:   S06.0X9S (ICD-10-CM) - Concussion with loss of consciousness, sequela   H51.11 (ICD-10-CM) - Convergence insufficiency       Visit # / Visits Authorized:  5 / 12   Date of Evaluation:  02/03/2025  Insurance Authorization Period: 01/06/2026  Plan of Care Certification:  04/01/2025     Time In: 0858 (Late arrival)   Time Out: 0916  Total Time: 18   Total Billable Time: 18 minutes      Precautions     Standard, Fall      Subjective   No reports upon arrival..         Objective         Functional Gait Assessment:   1. Gait on level surface =  1   (3) Normal: less than 5.5 sec, no A.D., no imbalance, normal gait pattern, deviates< 6in   (2) Mild impairment: 7-5.6 sec, uses A.D., mild gait deviations, or deviates 6-10 in   (1) Moderate impairment: > 7 sec, slow speed, imbalance, deviates 10-15 in.   (0) Severe impairment: needs assist, deviates >15 in, reach/touch wall  2. Change in Gait Speed = 2   (3) Normal: smooth change w/o loss of balance or gait deviation, deviates < 6 in, significant difference between speeds   (2) Mild impairment: changes speed, but demonstrates mild gait deviations, deviates 6-10 in, OR no deviations but unable to significantly speed, OR uses A.D.   (1) Moderate impairment: minor changes to speed, OR changes speed w/ significant deviations, deviates 10-15 in, OR  Changes speed , but loses balance & recovers   (0) Severe impairment: cannot change speed, deviates >15 in, or loses balance & needs assist  3. Gait with horizontal head turns  = 2   (3) Normal: no change in gait, deviates <6 in   (2) Mild impairment: slight change in speed,  deviates 6-10 in, OR uses A.D.   (1) Moderate impairment: moderate change in speed, deviates 10-15 in   (0) Severe impairment: severe disruption of gait, deviates >15in  4. Gait with vertical head turns = 2   (3) Normal: no change in gait, deviates <6 in   (2) Mild impairment: slight change in speed, deviates 6-10 in OR uses A.D.   (1) Moderate impairment: moderate change in speed, deviates 10-15 in   (0) Severe impairment: severe disruption of gait, deviates >15 in  5. Gait with pivot turns = 2   (3) Normal: performs safely in 3 sec, no LOB   (2) Mild impairment: performs in >3 sec & no LOB, OR turns safely & requires several steps to regain LOB   (1) Moderate impairment: turns slow, OR requires several small steps for balance following turn & stop   (0) Severe impairment: cannot turn safely, needs assist  6. Step over obstacle = 1   (3) Normal: steps over 2 stacked boxes w/o change in speed or LOB   (2) Mild impairment: able to step over 1 box w/o change in speed or LOB   (1) Moderate impairment: steps over 1 box but must slow down, may require VC   (0) Severe impairment: cannot perform w/o assist  7. Gait with Narrow MELLISSA = 2   (3) Normal: 10 steps no staggering   (2) Mild impairment: 7-9 steps   (1) Moderate impairment: 4-7 steps   (0) Severe impairment: < 4 steps or cannot perform w/o assist  8. Gait with eyes closed = 2   (3) Normal: < 7 sec, no A.D., no LOB, normal gait pattern, deviates <6 in   (2) Mild impairment: 7.1-9 sec, mild gait deviations, deviates 6-10 in   (1) Moderate impairment: > 9 sec, abnormal pattern, LOB, deviates 10-15 in   (0) Severe impairment: cannot perform w/o assist, LOB, deviates >15in  9. Ambulating Backwards = 2   (3) Normal: no A.D., no LOB, normal gait pattern, deviates <6in   (2) Mild impairment: uses A.D., slower speed, mild gait deviations, deviates 6-10 in   (1) Moderate impairment: slow speed, abnormal gait pattern, LOB, deviates 10-15 in   (0) Severe impairment: severe gait  deviations or LOB, deviates >15in  10. Steps = 2   (3) Normal: alternating feet, no rail   (2) Mild Impairment: alternating feet, uses rail   (1) Moderate impairment: step-to, uses rail   (0) Severe impairment: cannot perform safely    Score 18/30     Score:   <22/30 fall risk   <20/30 fall risk in older adults   <18/30 fall risk in Parkinsons    Treatment:       Assessment & Plan   Assessment: Patient reassessed for progress today. At this time, patient demonstrates poor progress. Patient exhibits significantly declined convergence point and significantly declined dynamic balance. At initial evaluation, he convergence point was 16 cm, today 29 cm. Her FGA score initially was 25/30, her score today is 18/30 now indicated poor dynamic balance and placing her in the elevated fall risk range. Despite low FGA score, she did not exhibit significant balance impairment rather self-limiting slow movement, possibly due to fear/cautiousness.  PT began to discuss today's finding with patient, at stated that today's findings (worse performance) opposes our expectations with PT. At this point, patient became defensive, stating that she was dizzy performing tests today and she was upset that she wasn't told what her initial numbers were. PT attempted to explain to objective nature of today's testing to determine progress. PT also intended to discuss POC moving forward, but patient became more upset and request to speak with a manager. Patient was guided to clinic supevisor's office to further discuss her complaints; PT self-dismissed at that time.       Patient will continue to benefit from skilled outpatient physical therapy to address the deficits listed in the problem list box on initial evaluation, provide pt/family education and to maximize pt's level of independence in the home and community environment.     Patient's spiritual, cultural, and educational needs considered and patient agreeable to plan of care and goals.            Plan: Continue per established POC under the care of a different treating PT.    Goals:   Active       Long term goals        Patient to improve FOTO by atleast 5 % for improved functional mobility  (Progressing)       Start:  02/04/25    Expected End:  04/01/25            Patient to improve FGA to atleast 26/30 for improved dynamic balance and no fall risk (Progressing)       Start:  02/04/25    Expected End:  04/01/25            Patient to improve convergence to atleast 12 cm for improved reading (Progressing)       Start:  02/04/25    Expected End:  04/01/25       3/10/2025 - 29 cm         Patient able to perform at least 30 min of moderate aerobic activity with less than or equal to 1 point increase in symptoms for improved endurance (Progressing)       Start:  02/04/25    Expected End:  03/04/25               Short term goals       Patient to perform HEP Independent  (Progressing)       Start:  02/04/25    Expected End:  03/04/25       3/10/2025 - Reports partial compliance. Reports consistence with walks with head turns, VOR to lesser degree         Patient able to perform VOR x 1 at 90 bpm with less than or equal to 1 point increase in symptoms for improved vestibular function  (Progressing)       Start:  02/04/25    Expected End:  03/04/25       3/10/2025 - unable to keep up with 90 bpm         Patient to score greater than or equal to 112/120 on mCTSIB for improved static balance (Progressing)       Start:  02/04/25    Expected End:  03/04/25                Summer Lamb PT

## 2025-03-16 NOTE — PROGRESS NOTES
"  Outpatient Rehab    Physical Therapy Visit    Patient Name: Lizabeth Gomez  MRN: [id number]  YOB: 1970  Encounter Date: 3/17/2025    Therapy Diagnosis:   Encounter Diagnosis   Name Primary?    Concussion with loss of consciousness, subsequent encounter Yes     Physician: Krzysztof Mayorga MD    Physician Orders: Eval and Treat  Medical Diagnosis: Concussion with loss of consciousness, sequela  Convergence insufficiency    Visit # / Visits Authorized:  6 / 12  Date of Evaluation: 02/03/2025  Insurance Authorization Period: 1/6/2025 to 1/6/2026  Plan of Care Certification:  04/01/2025     PT/PTA:     Number of PTA visits since last PT visit:   Time In: 1347   Time Out: 1425  Total Time: 38   Total Billable Time: 38    FOTO:  Intake Score:  %  Survey Score 1:  %  Survey Score 2:  %         Subjective   She is feeling okay this afternoon but reports some pain in her L knee that she feels mostly when she is walking around.  Pain reported as 8/10. L knee    Objective            Treatment:  Balance/Neuromuscular Re-Education  NMR 1: Seated VOR x 1 with large "X" target: 3 x 30 sec horizontal and 3  x 30 sec vertical  NMR 2: Seated saccades with "X targets: 3 x 30 sec horizontal; 3 x 30 sec vertical  NMR 3: Seated pencil push ups with 14 pt "X": 3 x 5 reps with 5 sec hold  NMR 4: Airex foam: 2 x 30 sec, NBOS + up/down head nods; 2 x 30 sec, NBOS + L/R head nods  Therapeutic Activity  TA 1: Gym ambulation, 4 x 30 feet each condition: Walking + L/R head nods; Walking + up/down head nods; Walking + diagonal head nods (2 trials per direction)    Time Entry(in minutes):  Neuromuscular Re-Education Time Entry: 23  Therapeutic Activity Time Entry: 15    Assessment & Plan   Assessment: Lizabeth tolerated today's session fairly well. She continues to report increased symptom provocation with oculomotor exercises (more with head motion than oculomotor movement) and requires rest breaks between sets/interventions to " recover. She showed fair tolerance to walking habituation tasks and static balance tasks but reported increased dizziness and intolerance after prolonged standing and mobility. She requested to end her session early due to intolerance to standing balance tasks at the end of her session; plan to progress as able. She remains appropriate for skilled physical therapy services to maximize her tolerance to community mobility.  Evaluation/Treatment Tolerance: Patient limited by fatigue    Patient will continue to benefit from skilled outpatient physical therapy to address the deficits listed in the problem list box on initial evaluation, provide pt/family education and to maximize pt's level of independence in the home and community environment.     Patient's spiritual, cultural, and educational needs considered and patient agreeable to plan of care and goals.           Plan: Continue per established POC.    Goals:   Active       Long term goals        Patient to improve FOTO by atleast 5 % for improved functional mobility  (Progressing)       Start:  02/04/25    Expected End:  04/01/25            Patient to improve FGA to atleast 26/30 for improved dynamic balance and no fall risk (Progressing)       Start:  02/04/25    Expected End:  04/01/25            Patient to improve convergence to atleast 12 cm for improved reading (Progressing)       Start:  02/04/25    Expected End:  04/01/25       3/10/2025 - 29 cm         Patient able to perform at least 30 min of moderate aerobic activity with less than or equal to 1 point increase in symptoms for improved endurance (Progressing)       Start:  02/04/25    Expected End:  03/04/25               Short term goals       Patient to perform HEP Independent  (Progressing)       Start:  02/04/25    Expected End:  03/04/25       3/10/2025 - Reports partial compliance. Reports consistence with walks with head turns, VOR to lesser degree         Patient able to perform VOR x 1 at 90 bpm  with less than or equal to 1 point increase in symptoms for improved vestibular function  (Progressing)       Start:  02/04/25    Expected End:  03/04/25       3/10/2025 - unable to keep up with 90 bpm         Patient to score greater than or equal to 112/120 on mCTSIB for improved static balance (Progressing)       Start:  02/04/25    Expected End:  03/04/25                Brenda Parikh, PT

## 2025-03-17 ENCOUNTER — CLINICAL SUPPORT (OUTPATIENT)
Dept: REHABILITATION | Facility: HOSPITAL | Age: 55
End: 2025-03-17
Payer: COMMERCIAL

## 2025-03-17 ENCOUNTER — OUTPATIENT CASE MANAGEMENT (OUTPATIENT)
Dept: ADMINISTRATIVE | Facility: OTHER | Age: 55
End: 2025-03-17
Payer: COMMERCIAL

## 2025-03-17 DIAGNOSIS — S06.0X9D CONCUSSION WITH LOSS OF CONSCIOUSNESS, SUBSEQUENT ENCOUNTER: Primary | ICD-10-CM

## 2025-03-17 PROCEDURE — 97530 THERAPEUTIC ACTIVITIES: CPT | Mod: PO

## 2025-03-17 PROCEDURE — 97112 NEUROMUSCULAR REEDUCATION: CPT | Mod: PO

## 2025-03-17 NOTE — PROGRESS NOTES
Outpatient Care Management      Patient: Lizabeth Gomez  MRN:  [id number]  Date:  3/17/2025  Completed by:  Juliet Harris LCSW  Referral Date:     Reason for Visit   Patient presents with    Other     Task completion       Brief Summary:  SW assisted patient and OT with paperwork that needed to be sent to Dr. Mayorga for patient's job. Placed in Pt chart and messaged Pt MD via inbox pool.    Future Appointments   Date Time Provider Department Center   3/20/2025  3:00 PM Krzysztof Mayorga MD [facility]   3/28/2025 10:30 AM Brenda Parikh, PT [facility]   4/4/2025  1:00 PM Tammy Sinclair, JONNIE-SLP, CCC-SLP [facility]   4/4/2025  1:45 PM Nya Mistry, PT [facility]   4/8/2025  1:45 PM Raphael Doe MD [facility]     Juliet Harris LCSW  Neuro Therapy   [facility]  [phone]

## 2025-03-18 ENCOUNTER — TELEPHONE (OUTPATIENT)
Facility: CLINIC | Age: 55
End: 2025-03-18
Payer: COMMERCIAL

## 2025-03-18 NOTE — TELEPHONE ENCOUNTER
----- Message from Krzysztof Mayorga MD sent at 3/18/2025  3:41 PM CDT -----  Regarding: RE: paperwork for her job needed to be signed  Correct. Thanks!Krzysztof  ----- Message -----  From: Isidro Trevino LPN  Sent: 3/18/2025   3:29 PM CDT  To: Megan Song MA; Elda Vargas MA; Andr#  Subject: RE: paperwork for her job needed to be signed    Thank you.  She can return to work with restrictions.  Correct?Moody  ----- Message -----  From: Krzysztof Mayorga MD  Sent: 3/18/2025   2:26 PM CDT  To: Isidro Trevino LPN; Megan Song MA; F#  Subject: RE: paperwork for her job needed to be signed    For her entirety with me, I've had her at light duty with typical restrictions for her normal shifts. Thanks!  ----- Message -----  From: Isidro Trevino LPN  Sent: 3/18/2025   2:21 PM CDT  To: Megan Song MA; Elda Vargas MA; Andr#  Subject: RE: paperwork for her job needed to be signed    From what I find, she's been back to regular duty since 02/13.  Is that inaccurate or has there been a change?Moody  ----- Message -----  From: Elda Vargas MA  Sent: 3/17/2025   3:33 PM CDT  To: Isidro Trevino LPN; Megan Song MA; A#  Subject: FW: paperwork for her job needed to be signed      ----- Message -----  From: Juliet Harris, ROSI  Sent: 3/17/2025   3:32 PM CDT  To: Mukesh Blankenship Staff  Subject: paperwork for her job needed to be signed        Hello, I am attaching a packet in Media that the Pt needs to be signed for her work. A lot of it is the same form with different dates. All those dates correspond with days she had attended therapy at our clinic. Can you all print and have Dr Mayorga sign the [employer] forms? If you can email it or fax it to me, I can get it to her and I would greatly appreciate the assistance. Thank you so much!!Sincerely,Juliet Harris [facility]er[facility] [phone]

## 2025-03-20 ENCOUNTER — OFFICE VISIT (OUTPATIENT)
Facility: CLINIC | Age: 55
End: 2025-03-20
Payer: COMMERCIAL

## 2025-03-20 VITALS — DIASTOLIC BLOOD PRESSURE: 87 MMHG | SYSTOLIC BLOOD PRESSURE: 138 MMHG | HEART RATE: 93 BPM

## 2025-03-20 DIAGNOSIS — S06.0X9S CONCUSSION WITH LOSS OF CONSCIOUSNESS, SEQUELA: Primary | ICD-10-CM

## 2025-03-20 DIAGNOSIS — S13.4XXD WHIPLASH INJURY TO NECK, SUBSEQUENT ENCOUNTER: ICD-10-CM

## 2025-03-20 DIAGNOSIS — M54.2 CERVICALGIA OF OCCIPITO-ATLANTO-AXIAL REGION: ICD-10-CM

## 2025-03-20 DIAGNOSIS — G44.86 CERVICOGENIC HEADACHE: ICD-10-CM

## 2025-03-20 PROCEDURE — 99999 PR PBB SHADOW E&M-EST. PATIENT-LVL III: CPT | Mod: PBBFAC,,, | Performed by: PSYCHIATRY & NEUROLOGY
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
## 2025-03-20 NOTE — PATIENT INSTRUCTIONS
Referral for lower neck/upper back to mid back PT with dry needling. No soft tissue manipulation of suboccipital region if you start to feel worse. All joint manipulation is fine.  The patient was instructed to ice the occipital region for no more than 20 minutes at least once a day but may repeat this as many times as they would like.  Discussed ergonomic accommodations for occipital neuritis/neuralgia. Mainly perform all work at eye level to minimize continued neck flexion which will aggravate the nerve.  Patient was encouraged to do daily light cardio exercise but instructed to limit physical activities to walking, walking in water up to the waist only or riding a stationary bike, recumbent preferred. No weight lifting in upper body, no neck massage, no acupuncture of neck, and no dry needling of upper neck. No neck PT unless otherwise stated. If neck PT is recommended, the therapist may do joint manipulation at this time but no suboccipital soft tissue therapy. Any of your therapists may also do passive neck range of motion activities. No chiropractor work on neck. Lower body strengthening with resistant bands, leg machines, and strapping weights to legs okay. No core body workouts. No running or use of cardio equipment other than stationary bike. No swimming or body surfing. No amusement park rides. No lifting more than 5-10 lbs and bend at the knees, not the waist.  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 To be referred for consultation for occipital nerve release procedure if initially clinically responsive to injections but always with a return of symptoms.  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 Delays in recovery may cause or further worsen any underlying permanent injury that was caused by the result of the patient's injury. Delays in recovery may also cause the development of new medical conditions that will then need to be treated. The development of some forms of permanent injuries may result in nerve injuries that are refractory to initial treatment, which could result in further medical expenses as more expensive methods of treatment or prolonged treatment may be needed to treat the underlying injury. In my collective clinical experience, my care plan as documented from the initial note leads to significant recovery from injuries similar to the patient's in the majority of my other patients. Delays in recovery will prevent the patient from returning to work full time in a timely fashion. Delays in recovery caused by workers compensation for patients with similar injury have also lead to significant financial settlements for the patients should a legal dispute arise. I have counseled the patient on all of the above.  Please be advised that I do not determine MMI and will update in the assessment whether patient is improving or not, or has plateaued to the point of permanent symptoms despite successfully following recommended treatment by myself  Please be advised that the above work status is re-evaluated each visit and that the nature of the patient's injury described in the assessment and diagnoses do not have a standard or  expected timeline for recovery as demonstrated in multiple up to date peer-reviewed publications  Please be advised that I do not perform FCEs. I will also not answer or comment on any FCE questions.  Please be advised I will not fill out additional paperwork that asks me to restate diagnoses, plan of care, work status, and/or accommodations as these are all documented in my clinic note.  Please be advised I will not fill out paperwork asking me to agree with a workers compensation representative's interpretation of my plan of care and/or evaluation of the patient as I stand by what is documented in my clinic note.  Please be advised that any peer to peer requests made on the behalf of workers compensation will need to be scheduled based on my availability and will be completed by myself within 1 week of the request. My clinic is not capable of doing on demand peer to peer requests with less than 48 hours of notice.

## 2025-03-28 ENCOUNTER — CLINICAL SUPPORT (OUTPATIENT)
Dept: REHABILITATION | Facility: HOSPITAL | Age: 55
End: 2025-03-28
Payer: COMMERCIAL

## 2025-03-28 ENCOUNTER — CLINICAL SUPPORT (OUTPATIENT)
Dept: REHABILITATION | Facility: HOSPITAL | Age: 55
End: 2025-03-28
Attending: PSYCHIATRY & NEUROLOGY
Payer: COMMERCIAL

## 2025-03-28 DIAGNOSIS — M54.2 NECK PAIN: Primary | ICD-10-CM

## 2025-03-28 DIAGNOSIS — S06.0X9D CONCUSSION WITH LOSS OF CONSCIOUSNESS, SUBSEQUENT ENCOUNTER: Primary | ICD-10-CM

## 2025-03-28 PROCEDURE — 97112 NEUROMUSCULAR REEDUCATION: CPT | Mod: PO

## 2025-03-28 PROCEDURE — 97161 PT EVAL LOW COMPLEX 20 MIN: CPT | Mod: PO

## 2025-03-28 PROCEDURE — 97530 THERAPEUTIC ACTIVITIES: CPT | Mod: PO

## 2025-03-28 NOTE — PROGRESS NOTES
Outpatient Rehab    Physical Therapy Evaluation    Patient Name: Lizabeth Gomez  MRN: [id number]  YOB: 1970  Encounter Date: 3/28/2025    Therapy Diagnosis:   Encounter Diagnosis   Name Primary?    Neck pain Yes     Physician: Krzysztof Mayorga MD    Physician Orders: Eval and Treat  Medical Diagnosis: Whiplash injury to neck, subsequent encounter  Cervicalgia of bmsgbstm-wrlykbv-aanzj region  Cervicogenic headache    Visit # / Visits Authorized:  1 / 16  Insurance Authorization Period: 3/20/2025 to 3/20/2026  Date of Evaluation: 3/28/2025  Plan of Care Certification: 3/28/2025 to 5/28/2025     Time In: 1445   Time Out: 1530  Total Time: 45   Total Billable Time: 38    Intake Outcome Measure for FOTO Survey    Therapist reviewed FOTO scores for Lizabeth Gomez on 3/28/2025.   FOTO report - see Media section or FOTO account episode details.     Intake Score: 43%         Subjective   History of Present Illness  Lizabeth is a 54 y.o. female who reports to physical therapy with a chief concern of Neck pain and L shoulder pain.     The patient reports a medical diagnosis of Cervicalgia. The patient has experienced this issue since 03/20/25.   Diagnostic tests related to this condition: MRI studies.   MRI Studies Details: Traumatic rupture of supreaspinatus tendon of L shoudler, sequela, Biceps tendonitis on L , Fracture of humeral head, closed, left, sequela    History of Present Condition/Illness: Tripped fell 9/19/2024 injuring her neck and L shoulder and sustained a concussion. Pt c/o neck and L shoulder pain.     Activities of Daily Living  Previously independent with activities of daily living? Yes     Currently independent with activities of daily living? Yes     Pt reports that she is independent with all ADL.          Pain     Patient reports a current pain level of 7/10. Pain at best is reported as 6/10. Pain at worst is reported as 9/10.   Location: L side of her neck and  "shoulder  Clinical Progression (since onset): Improved  Pain Qualities: Aching, Sharp, Tightness  Pain-Relieving Factors: Medications - prescription, Other (Comment)  Other Pain-Relieving Factors: icy/hot  Stiff and sore first thing in the morning.  It gets better throughout the day and worse at night.      Treatment History  Treatments  Previously Received Treatments: Yes  Previous Treatments: Physical therapy, Medications - prescription  Currently Receiving Treatments: Yes  Current Treatments: Medications - prescription, Medications - over-the-counter, Physical therapy, Speech language therapy    Living Arrangements  Living Situation  Living Arrangements: Family members    Home Setup  Home Access: Stairs with rails  Entrance Stairs - Number of Steps: 4  Entrance Stairs - Rails: Both  Number of Levels in Home: One level        Employment  Employment Status: Employed full-time   Pt reports that she on light duty at work.  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  Restrictions from virtual appointment with Moreno Zafar PA-C: "Limited use of L hand and arm, No above the shoulder/overhead work, sit down work only (Allow To take breaks 10-15 minutes/hour)"      Past Medical History/Physical Systems Review:   Lizabeth Gomez  has a past medical history of Anxiety, Arthrosis of left acromioclavicular joint, Depression, Eczema, and Hypertension.    Lizabeth Gomez  has a past surgical history that includes Tonsillectomy () and  section ().    Lizabeth has a current medication list which includes the following prescription(s): amlodipine, hydrochlorothiazide, lisinopril, meloxicam, mupirocin, tizanidine, and topiramate.    Review of patient's allergies indicates:  No Known Allergies     Objective   Posture        Shoulders are Rounded. Left pelvis characteristics: Anterior Superior Iliac Spine Higher, Posterior Superior Iliac Spine Higher, and Ilium Higher          "     Cervical Thoracic Sensation  Right Cervical/Thoracic Sensation  Intact: Light Touch       Left Cervical/Thoracic Sensation  Intact: Light Touch                Subcranial Range of Motion   Active Restricted? Passive Restricted? Pain   Flexion         Protraction         Retraction           Cervical Range of Motion   Active (deg) Passive (deg) Pain   Flexion 10       Extension 25       Right Lateral Flexion 15       Right Rotation 50       Left Lateral Flexion 30       Left Rotation 45              Shoulder Range of Motion  Right Shoulder   Active (deg) Passive (deg) Pain   Flexion 175       Extension         Scaption         ABduction   122     ADduction         Horizontal ABduction         Horizontal ADduction         External Rotation (Shoulder ABducted 0 degrees)         External Rotation (Shoulder ABducted 45 degrees)         External Rotation (Shoulder ABducted 90 degrees)   100     Internal Rotation (Shoulder ABducted 0 degrees)         Internal Rotation (Shoulder ABducted 45 degrees)         Internal Rotation (Shoulder ABducted 90 degrees)   30       Left Shoulder   Active (deg) Passive (deg) Pain   Flexion   75 Yes   Extension         Scaption         ABduction   45 Yes   ADduction         Horizontal ABduction         Horizontal ADduction         External Rotation (Shoulder ABducted 0 degrees)         External Rotation (Shoulder ABducted 45 degrees)   22 Yes   External Rotation (Shoulder ABducted 90 degrees)         Internal Rotation (Shoulder ABducted 0 degrees)         Internal Rotation (Shoulder ABducted 45 degrees)   45     Internal Rotation (Shoulder ABducted 90 degrees)                       Shoulder Strength - Planes of Motion   Right Strength Right Pain Left Strength Left  Pain   Flexion 5   2- Yes   Extension           ABduction 5   2- Yes   ADduction           Horizontal ABduction           Horizontal ADduction           Internal Rotation 0° 5   4     Internal Rotation 90°     4     External  Rotation 0° 5   3- Yes   External Rotation 90°               Elbow Strength   Right Strength Right Pain Left Strength Left  Pain   Flexion (C6) 5   4+     Extension (C7) 5   4+ Yes                     Treatment:  Other Activities  Activity 1: Pt did not receive Physical Therapy treatment today.    Time Entry(in minutes):  PT Evaluation (Low) Time Entry: 38    Assessment & Plan   Assessment  Lizabeth presents with a condition of Low complexity.           Impairments: Abnormal or restricted range of motion, Activity intolerance, Impaired physical strength, Pain with functional activity    Patient Goal for Therapy (PT): None stated  Prognosis: Good  Assessment Details: Pt is a 53 y/o female referred to Physical Therapy with a Dx of Cervicalgia.  She reports pain in her neck and L shoulder secondary to a fall in September of 2024 in which she sustained a concussion.  She reports having Physical Therapy in the fall of 2024. Previous notes indicate that she was treated for neck and back pain as well as limited L shoulder ROM.    Plan  From a physical therapy perspective, the patient would benefit from: Skilled Rehab Services    Planned therapy interventions include: Therapeutic exercise, Therapeutic activities, Neuromuscular re-education, Manual therapy, and Other (Comment). Dry needling  Planned modalities to include: Electrical stimulation - passive/unattended.        Visit Frequency: 2 times Per Week for 6 Weeks.       This plan was discussed with Patient.   Discussion participants: Agreed Upon Plan of Care             Patient's spiritual, cultural, and educational needs considered and patient agreeable to plan of care and goals.           Goals:   Active       Long term goals       Pt will be independent in a HEP to assist in managing their neck and L shoulder Sx.         Start:  03/28/25    Expected End:  05/28/25            Improve Cx spine ROM by 20 degrees       Start:  03/28/25    Expected End:  05/28/25             Improve L shoulder ROM to 145 degrees of flexion, 110 degrees of abduction and 45 degrees of IR and ER at 90 degrees of abduction       Start:  03/28/25    Expected End:  05/28/25            Pt will report improved function through an improved score on the FOTO Neck survey       Start:  03/28/25    Expected End:  05/28/25            Pt will return to work full time/full duty       Start:  03/28/25    Expected End:  05/28/25               Short term goals       Pt will be instructed in an exercise program to address functional deficits related to  neck Sx       Start:  03/28/25    Expected End:  04/28/25            Improve Cx spine ROM by 10 degrees       Start:  03/28/25    Expected End:  04/28/25            Improve L shoulder ROM to 110 degrees of flexion, 90 degrees of abduction and 25 degrees of IR and ER at 90 degrees of abduction       Start:  03/28/25    Expected End:  04/28/25            Pt will be compliant with her Physical Therapy appointments       Start:  03/28/25    Expected End:  04/28/25                  José Manuel Ortega, PT

## 2025-03-28 NOTE — PROGRESS NOTES
"  Outpatient Rehab    Physical Therapy Visit    Patient Name: Lizabeth Gomez  MRN: [id number]  YOB: 1970  Encounter Date: 3/28/2025    Therapy Diagnosis:   Encounter Diagnosis   Name Primary?    Concussion with loss of consciousness, subsequent encounter Yes     Physician: Krzysztof Mayorga MD    Physician Orders: Eval and Treat  Medical Diagnosis: Concussion with loss of consciousness, sequela  Convergence insufficiency    Visit # / Visits Authorized:  7 / 12  Insurance Authorization Period: 1/6/2025 to 1/6/2026  Date of Evaluation: 2/3/2025  Plan of Care Certification:  04/01/2025     PT/PTA:     Number of PTA visits since last PT visit:   Time In: 1041   Time Out: 1119  Total Time: 38   Total Billable Time: 38    FOTO:  Intake Score:  %  Survey Score 1:  %  Survey Score 2:  %         Subjective   She is feeling okay this morning but states that she was dealing with some personal errands this morning and running late..  Pain reported as 0/10. no pain reported at the start of her session.    Objective            Treatment:  Balance/Neuromuscular Re-Education  NMR 1: Seated VOR x1 with "X" target: 3 x 30 sec horizontal at self-selected speed; 3 x 30 sec vertical at 60 bpm (reported increased dizziness/headache with increased speed of metronome)  NMR 2: Seated saccades with "X targets, self-selected speed: 3 x 30 sec horizontal; 3 x 30 sec vertical  NMR 3: Seated pencil push ups with 14 pt "X": 3 x 5 reps with 5 sec hold  NMR 4: Airex foam: 2 x 30 sec, NBOS + up/down head nods; 2 x 30 sec, NBOS + L/R head nods; 3 x 30 sec, NBOS + eyes closed  NMR 5: Firm ground: 2 x 30" B, Tandem stance, touchdown support as needed  Therapeutic Activity  TA 1: Gym ambulation, 4 x 30 feet each condition: Walking + L/R head nods; Walking + up/down head nods; Walking + diagonal head nods (2 trials per direction)    Time Entry(in minutes):  Neuromuscular Re-Education Time Entry: 29  Therapeutic Activity Time Entry: " 9    Assessment & Plan   Assessment: Lizabeth performed well during today's session with improved tolerance noted to habituation and balance tasks. She remains challenged the most by oculomotor tasks and reported increased dizziness and headache when PT added in the metronome to her VOR tasks; provided education on the typical response to vestibular interventions and to monitor for significant increases in her dizziness/headache symptoms. She remains appropriate for skilled physical therapy interventions to maximize her tolerance for community and functional mobility.  Evaluation/Treatment Tolerance: Patient tolerated treatment well    Patient will continue to benefit from skilled outpatient physical therapy to address the deficits listed in the problem list box on initial evaluation, provide pt/family education and to maximize pt's level of independence in the home and community environment.     Patient's spiritual, cultural, and educational needs considered and patient agreeable to plan of care and goals.           Plan: Continue per established POC.    Goals:   Active       Long term goals        Patient to improve FOTO by atleast 5 % for improved functional mobility  (Progressing)       Start:  02/04/25    Expected End:  04/01/25            Patient to improve FGA to atleast 26/30 for improved dynamic balance and no fall risk (Progressing)       Start:  02/04/25    Expected End:  04/01/25            Patient to improve convergence to atleast 12 cm for improved reading (Progressing)       Start:  02/04/25    Expected End:  04/01/25       3/10/2025 - 29 cm         Patient able to perform at least 30 min of moderate aerobic activity with less than or equal to 1 point increase in symptoms for improved endurance (Progressing)       Start:  02/04/25    Expected End:  03/04/25               Short term goals       Patient to perform HEP Independent  (Progressing)       Start:  02/04/25    Expected End:  03/04/25        3/10/2025 - Reports partial compliance. Reports consistence with walks with head turns, VOR to lesser degree         Patient able to perform VOR x 1 at 90 bpm with less than or equal to 1 point increase in symptoms for improved vestibular function  (Progressing)       Start:  02/04/25    Expected End:  03/04/25       3/10/2025 - unable to keep up with 90 bpm         Patient to score greater than or equal to 112/120 on mCTSIB for improved static balance (Progressing)       Start:  02/04/25    Expected End:  03/04/25                Brenda Parikh, PT

## 2025-04-01 ENCOUNTER — CLINICAL SUPPORT (OUTPATIENT)
Dept: REHABILITATION | Facility: HOSPITAL | Age: 55
End: 2025-04-01
Payer: COMMERCIAL

## 2025-04-01 DIAGNOSIS — M54.2 NECK PAIN: Primary | ICD-10-CM

## 2025-04-01 PROCEDURE — 97110 THERAPEUTIC EXERCISES: CPT | Mod: PO

## 2025-04-01 PROCEDURE — 97140 MANUAL THERAPY 1/> REGIONS: CPT | Mod: PO

## 2025-04-01 PROCEDURE — 97112 NEUROMUSCULAR REEDUCATION: CPT | Mod: PO

## 2025-04-01 NOTE — PROGRESS NOTES
Outpatient Rehab    Physical Therapy Visit    Patient Name: Lizabeth Gomez  MRN: [id number]  YOB: 1970  Encounter Date: 4/1/2025    Therapy Diagnosis:   Encounter Diagnosis   Name Primary?    Neck pain Yes     Physician: Krzysztof Mayorga MD    Physician Orders: Eval and Treat  Medical Diagnosis: Whiplash injury to neck, subsequent encounter  Cervicalgia of ysngtxiu-jqcajjx-wlglo region  Cervicogenic headache    Visit # / Visits Authorized:  2 / 16  Insurance Authorization Period: 3/20/2025 to 3/20/2026  Date of Evaluation: 3/28/2025   Plan of Care Certification: 3/28/2025 to 5/28/2025       PT/PTA: PT   Number of PTA visits since last PT visit:0  Time In: 1100   Time Out: 1140  Total Time: 40   Total Billable Time: 40    FOTO:  Intake Score:  %  Survey Score 1:  %  Survey Score 2:  %         Subjective   Pt reports contniued discomfort in her L upper quarter.  Pain reported as 5/10.      Objective            Treatment:  Therapeutic Exercise  TE 1: Seated Cx spine rotation 5 x 5 seconds to R & L  TE 2: Seated Cx spine side bending5 x 5 seconds to R & L  TE 3: Instructed in seated trunk rotation  TE 4: Table slides into flexion/extension as tolerated 10 x 3  TE 5: standing pulleys x 3 minutes  Manual Therapy  MT 1: Soft tissue mobilization to Cx spine paraspinals and upper traps  MT 2: Manual Cx traction  Balance/Neuromuscular Re-Education  NMR 6: Scapular retractions 10 x 2 - with retraction as tolerate  NMR 7: Shoulder shrugs 10 x 2    Time Entry(in minutes):  Manual Therapy Time Entry: 15  Neuromuscular Re-Education Time Entry: 15  Therapeutic Activity Time Entry: 10    Assessment & Plan   Assessment: Pt with c/o L upper quarter pain more so than neck discomfort.  She reported L lateral neck muscle tightness at end range of Cx spine R side bending and rotation.  Her R shoulder ROM is limited and she is tender to palpation in the posterior L upper 1/4.  Pt tolerated today's Tx with increasaed  discomfort, but she was willing to attempt all activities.       Patient will continue to benefit from skilled outpatient physical therapy to address the deficits listed in the problem list box on initial evaluation, provide pt/family education and to maximize pt's level of independence in the home and community environment.     Patient's spiritual, cultural, and educational needs considered and patient agreeable to plan of care and goals.           Plan: Continue per established POC. attempt seated thoracic mobilization wiht a towel roll, pendulum/arm hangs    Goals:   Active       Long term goals       Pt will be independent in a HEP to assist in managing their neck and L shoulder Sx.         Start:  03/28/25    Expected End:  05/28/25            Improve Cx spine ROM by 20 degrees       Start:  03/28/25    Expected End:  05/28/25            Improve L shoulder ROM to 145 degrees of flexion, 110 degrees of abduction and 45 degrees of IR and ER at 90 degrees of abduction       Start:  03/28/25    Expected End:  05/28/25            Pt will report improved function through an improved score on the FOTO Neck survey       Start:  03/28/25    Expected End:  05/28/25            Pt will return to work full time/full duty       Start:  03/28/25    Expected End:  05/28/25               Short term goals       Pt will be instructed in an exercise program to address functional deficits related to  neck Sx       Start:  03/28/25    Expected End:  04/28/25            Improve Cx spine ROM by 10 degrees       Start:  03/28/25    Expected End:  04/28/25            Improve L shoulder ROM to 110 degrees of flexion, 90 degrees of abduction and 25 degrees of IR and ER at 90 degrees of abduction       Start:  03/28/25    Expected End:  04/28/25            Pt will be compliant with her Physical Therapy appointments       Start:  03/28/25    Expected End:  04/28/25                José Manuel Ortega, PT

## 2025-04-02 NOTE — PROGRESS NOTES
Outpatient Rehab  Speech-Language Pathology Evaluation (only)    Patient Name: Lizabeth Gomez  MRN: [id number]  YOB: 1970  Encounter Date: 4/4/2025    Therapy Diagnosis:   Encounter Diagnosis   Name Primary?    Cognitive communication deficit Yes       Physician: Krzysztof Mayorga MD    Physician Orders: Eval and Treat  Medical Diagnosis: Concussion with loss of consciousness, sequela  Cognitive change    Visit # / Visits Authorized: 1 / 12    Insurance Authorization Period: 1/6/2025 to 1/6/2026  Date of Evaluation: 4/4/2025   Plan of Care Certification:    4/4/2025 to 5/30/25      Time In: 0105   Time Out: 0148  Total Time: 43   Test Interpretation Time: 15 minutes   Total Billable Time:   58 minutes     Intake Outcome Measure for FOTO Survey    Therapist reviewed FOTO scores for Lizabeth Gomez on 4/4/2025.   FOTO report - see Media section or FOTO account episode details.     Intake Score: 38.7%     Subjective   History of Present Illness    Date of Onset: September 2024   History of Current Condition:  Lizabeth Gomez is a 54 y.o. female who presents to [facility] for evaluation secondary to concussion. Patient was referred to therapy by Krzysztof Mayorga MD , which is the patient's neurologist. Patient reports word finding difficulty, attention and memory difficulty. Patient also endorses cognitive fatigue and that she is easily frustrated.  Patient attended evaluation alone.     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 Location: left shoulder/neck     Treatment History:     -No Previously Received Speech Therapy Treatments  -Not Currently Receiving Speech Therapy Treatments     Living Arrangements  Living Situation: Home independently. Patient lives with her daughter.    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     Past Medical History/Physical Systems Review:   Lizabeth Gomez  has a past medical history of Anxiety, Arthrosis of left acromioclavicular joint, Depression, Eczema, and Hypertension.    Lizabeth Gomez  has a past surgical history that includes Tonsillectomy () and  section ().    Lizabeth has a current medication list which includes the following prescription(s): amlodipine, hydrochlorothiazide, lisinopril, meloxicam, mupirocin, tizanidine, and topiramate.    Review of patient's allergies indicates:  No Known Allergies     Objective            Formal Assessment:    The Repeatable Battery for the Assessment of Neuropsychological Status (RBANS) Version B was administered to measure the patient's attention, [id number], visuospatial/constructional abilities, and immediate and delayed memory. The results are outlined below:    Domain Subtest Total Score Index Score   Immediate Memory List Learning 28   85    Story Memory 12    Visuospatial/  Constructional Figure Copy 20   121    Line Orientation 19    Language Picture Naming 10   102    Semantic Fluency 23    Attention Digit Span 16   135    Coding 61      Delayed Memory List Recall 0     78    List Recognition 18     Story Recall 4     Figure Recall 13        Total Scale   105     Percentile   63%     Descriptor   Average   Interpretation:  Index score: mean of 100, standard deviation of 15. Therefore, 130+ = Very Superior; 120-129 = Superior; 110-119 = High average;  = Average; 80-89 = Low Average; 70-79 = Borderline; 69 and below = Extremely Low. Apparently the most reliable score; factor in education level.    Immediate Memory Score: Recalling information following immediate presentation is assessed through the List Learning and Story Memory subtests. In the List Learning subtest, the patient is given 10 words to remember. This list is presented four times overall. In this subtest, the patient did  demonstrate learning over the 4 trials. The patient recalled 6 items on trial one, 6 items on trial two, 7 items on trial three, and 9 items in trial 4. In the Story Memory subtest, the patient recalled 5 details on the first presentation and 7 details on the second presentation. Results of this domain indicate Low average performance.  Visuospatial score: Perceiving spatial relations and constructing a spatially accurate copy of a drawing is assessed through the Figure Copy and Line Orientation subtests. The Figure Copy subtest asks the patient to copy a complex line drawing. The patient correctly marisela/placed 20/20 components of figure. The Line Orientation subtest the presents the patient with 12 line displays and asks the patient to match two given lines at the bottom to the display at the top.  The patient correctly identified 19/20. Results of this domain indicate Superior performance.  Language score: Naming common items and retrieving learned material is assessed through the Picture Naming and Semantic Fluency subtests. The Picture Naming subtest asks the patient to name 10 line drawings. The patient was able to accurately name 10/10 items. The Semantic Fluency subtest asks the patient to name as many animals as she can in 60 seconds. The patient was able to name 19 animals. These results indicate Average performance.   Attention score: Attending to, holding and manipulating information presented visually and orally in working memory is assessed with use of the Digit Span and Coding subtests. The Digit Span subtest asks the patient to repeat progressively lengthening strings of numbers. Patient able to repeat digit strings up to 9 digits without difficulty - patient endorses that she does well with rhythmic recall/numeric patterns. The Coding subtest asks the patient to alternate attention between a key the given work and then to decode symbols to numbers. Patient accurately decoded 61/89 symbols within 90  seconds. The patients results on these subtest indicate Very Superior performance.  Delayed Memory score: Anterograde memory capacity is assessed through the List Recall, List Recognition, Story Recall, and Figure Recall subtests. The List Recall subtest asks the patient to recall items from the list presented at the beginning of the test. The patient was able to recall 0/10 items. The List Recognition subtest has the patient recall whether a word was or was not in the original list. The patient accurately identified whether a word was or was not on the list in 18 of 20 items. On the Story Recall subtest, the patient was able to recall 4/12 details. Finally, on the Figure Recall, the patient recalled 13/20 figure details. Results of this domain indicate Borderline  performance.    Time Entry(in minutes):  Standardized Cog Perf Testing w/ Interp Time Entry: 58    Assessment & Plan   Assessment/Diagnosis and Impressions: Lizabeth presents to [facility] status post medical diagnosis of Concussion with loss of consciousness, sequela and Cognitive change. Overall, despite the average scores on this assessment, the patient reports functional cognitive changes from her baseline. Patient's limitations are interfering with work responsibilities, placing the patient at risk for a decline in quality of life. Taking all of these concerns into account and using the patient as an element of evidence-based practice, the patient presents with a mild cognitive communication disorder charaterized by deficits in attention/working memory, immediate/delayed memory, and information processing in addition to word finding difficulty. These difficulties are resulting in changes in overall executive functions.    Personal Factors Affecting Prognosis: Motivation    Evaluation/Treatment Response: Patient responded to treatment well     Patient Goal for Therapy (SLP): Improve overall cognitive skills and cognitive  fatigue.  Prognosis: Good       Education: Education was done with Patient. The patient's learning style includes Listening. The patient Verbalizes understanding.  Education provided regarding role of Speech Therapy, goals/plan of care, scheduling/cancellations, insurance limitations with patient    Plan: From a speech language pathology perspective, the patient would benefit from: Skilled Rehab Services  Planned therapy interventions and modalities include: Cognitive therapy.     Visit Frequency: 1 times Per Week for 8 Weeks. This plan was discussed with Patient.   Patient Agreed Upon Plan of Care  Recommended Treatment Plan:  Patient will participate in the [facility] program for speech therapy 1 times per week for 8 weeks to address her Cognition deficits, to educate patient and their family, and to participate in a home exercise program.    Patient's spiritual, cultural, and educational needs considered and patient agreeable to plan of care and goals.     Goals:   Active       Long Term Goals        1 Patient will improve  attention skills to effectively attend to and communicate in complex daily living tasks in functional living environment.          Start:  04/04/25    Expected End:  05/30/25            2 Patient will use appropriate memory strategies to schedule and recall weekly activities, express needs and recall names to maintain safety and participate socially in functional living environment.          Start:  04/04/25    Expected End:  05/30/25            3 Patient will demonstrate use of self awareness,  goal setting, planning,  initiation, and  self-monitoring during daily living activities to improve safety and awareness in functional living environment         Start:  04/04/25    Expected End:  05/30/25            4 Patient will develop functional cognitive-linguistic based skills/utilize compensatory strategies to communicate wants/needs effectively to different conversational partners,  maintain safety, participate socially in functional living environment.           Start:  04/04/25    Expected End:  05/30/25               Short Term Goals       1 Patient will complete tasks requiring alternating attention with 90% accuracy given min cues to improve attention skills.       Start:  04/04/25    Expected End:  05/02/25            2 Patient will immediately recall specific details from information recently seen or heard using memory retrieval strategies with 90% accuracy given min cues to improve auditory and visual recall.          Start:  04/04/25    Expected End:  05/02/25            3 Patient will complete 3-4 unit mental manipulation tasks with 90% accuracy given min cues to improve working memory/mental flexibility.          Start:  04/04/25    Expected End:  05/02/25            4 Patient will complete simple - moderately complex planning/organization/reasoning tasks with 90% accuracy given min cues to improve executive functioning skills.        Start:  04/04/25    Expected End:  05/02/25            5 Patient will complete word finding tasks with semantic relationships (I.e. Similarities and differences, synonyms, antonyms, semantic category) with 90% acc independently to improve word finding.        Start:  04/04/25    Expected End:  05/02/25              Short Term Goals      1 Patient will complete tasks requiring alternating attention with 90% accuracy given min cues to improve attention skills.       2 Patient will immediately recall specific details from information recently seen or heard using memory retrieval strategies with 90% accuracy given min cues to improve auditory and visual recall.        3 Patient will complete 3-4 unit mental manipulation tasks with 90% accuracy given min cues to improve working memory/mental flexibility.        4 Patient will complete simple - moderately complex planning/organization/reasoning tasks with 90% accuracy given min cues to improve executive  functioning skills.      5 Patient will complete word finding tasks with semantic relationships (I.e. Similarities and differences, synonyms, antonyms, semantic category) with 90% acc independently to improve word finding.           GLEN Stubbs, L-SLP, CCC-SLP  Speech Language Pathologist   4/4/2025
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
## 2025-04-04 ENCOUNTER — CLINICAL SUPPORT (OUTPATIENT)
Dept: REHABILITATION | Facility: HOSPITAL | Age: 55
End: 2025-04-04
Payer: COMMERCIAL

## 2025-04-04 DIAGNOSIS — M54.2 NECK PAIN: Primary | ICD-10-CM

## 2025-04-04 DIAGNOSIS — S06.0X9D CONCUSSION WITH LOSS OF CONSCIOUSNESS, SUBSEQUENT ENCOUNTER: Primary | ICD-10-CM

## 2025-04-04 DIAGNOSIS — R41.841 COGNITIVE COMMUNICATION DEFICIT: Primary | ICD-10-CM

## 2025-04-04 PROCEDURE — 97110 THERAPEUTIC EXERCISES: CPT | Mod: PO,CQ

## 2025-04-04 PROCEDURE — 97112 NEUROMUSCULAR REEDUCATION: CPT | Mod: PO,CQ

## 2025-04-04 PROCEDURE — 97530 THERAPEUTIC ACTIVITIES: CPT | Mod: PO

## 2025-04-04 PROCEDURE — 97112 NEUROMUSCULAR REEDUCATION: CPT | Mod: PO

## 2025-04-04 PROCEDURE — 96125 COGNITIVE TEST BY HC PRO: CPT | Mod: PO | Performed by: SPEECH-LANGUAGE PATHOLOGIST

## 2025-04-04 PROCEDURE — 97140 MANUAL THERAPY 1/> REGIONS: CPT | Mod: PO,CQ

## 2025-04-04 NOTE — PATIENT INSTRUCTIONS
Memory Strategies:   Write: make a list or utilize a calendar   Repeat:  Repeat information out loud or internally   Associate:  Connect desired information with something you already recall. For example, when in the grocery, group items by meal or taste (sweet vs salty) or by category (vegetables, cold items, etc).  Picture: try to mentally picture what you are trying to remember  Mental Rehearsal: think through how you're going to complete a task before completing it.   ROEL Carter., IMELDA Perez, & NAINA Bowie (2012). Cognitive rehabilitation manual: Translating evidence-based recommendations into practice. [place], [place]: [facility].           Attention: Remember that inattention and lack of focus are major culprits to forgetting information so be sure and practice paying attention for adequate learning of information. If you rely on passive attention to remembering something (e.g., yeah, uh-huh approach), youll find you cannot recall it later. I recommend the following to improve attention, which may aid in later recall:   Reduce distractions in the area as much as possible.  Look at the person as they are speaking to you.   Paraphrase as they are speaking  Write down important pieces of information   Ask them to repeat if you zone out.   Have them simplify and reduce information that you need to attend to during conversation.   Have visual cues to remind you if you need to do something later.  Processing Speed:   Using multiple modalities (e.g., listening, writing notes, asking questions, recording) to learn new information is likely to allow additional time for processing, thus improving memory for the material.   Allowing sufficient time to complete tasks will reduce frustration and help to ensure completion.  Executive Functioning:  Dont attempt to multi-task.  Separate tasks so that each can be completed one at a time.  Consider using a calendar/day planner, as that may be effective to help  you plan and stay on track.  Color-coding specific tasks by importance may add additional benefit to your planner.  Break down large projects into smaller tasks and write down the steps to completing the task.  Taking notes while reading can help with recall.  Storing Information: Use the below strategies to help you further enhance how information is stored.  Rehearse - Immediately after seeing/hearing something, try to recall it.  Wait a few minutes, then check again.  Gradually lengthen the intervals between rehearsals.  Repetition of learned material is critical to ensure storage of information to be learned. Self-test at home to ensure learning.  Write down important information to improve your attention and focus and to have something to look back on when you need to recall it.  Make sure the person doesnt rattle off, but presents in a clear, logical, and unhurried manner.   Recalling Information:  Jog your memory - Lose something?  Think back to when you last had it.  What did you do next?  And after that?  Mentally walk yourself through each activity that followed.  Prodding your memory this way may enable you to recall the location of the missing item.  Use a cue - Symbolic reminders (the proverbial string around the finger) are helpful.  So too are memos, timers, calendar notes, etc.--keep them in visible, appropriate places.  Get organized - Have fixed locations for all important papers, key phone numbers, medications, keys, wallet, glasses, tools, etc.  Develop routines - Routines can anchor memories so they do not drift away.    6. Practice good cognitive/brain health hygiene:  Continue engaging in regular exercise, which increases alertness and arousal and can improve attention and focus.    Get a good nights sleep, as this can enhance alertness and cognition.  Eat healthy foods and balanced meals. It is notable that research indicates certain nutrients may aid in brain function, such as B vitamins  "(especially B6, B12, and folic acid), antioxidants (such as vitamins C and E, and beta carotene), and Omega-3 fatty acids. Talk with your physician or nutritionist about whats right for you.   Keep your brain active. Find activities to stay mentally active, such as reading, games (cards, checkers), puzzles (crosswords, Sudoku, jig saw), crafts (models, woodworking), gardening, or participating in activities in the community.  Stay socially engaged. Continue staying active with your family and friends.   Resources: Consider resources for support through the Governors Office of Elderly Affairs (http://goea.[place].gov/), [place] Chapter of the Alzheimers Association (www.alz.org/[place] [facility]/), the Family Caregiver [place] (www.caregiver.org), and the American Psychological Association (http://www.apa.org/pi/about/publications/caregivers/consumers/index.aspxconsumers/index.aspx). See the following website for information about potential clinical trials: https://www.theaftd.org/research-clinical-trials/caez-bx-hiunpxeaefx/clinical-trials/    WRAP Write, Repeat, Associate, Picture - group of strategies for recall   Mental Rehearsal For "thinking through" your plan for the next day   Lists Create a list each night for the next day   Sticky Notes Bright and with specific instructions   Alarms  For things you need to remember regularly (i.e. Meals)   Mental Review How did I do with my strategies today?   Goal  Plan  Action  Review Goal - what is my goal?  Plan - how will I do it?  Action - try to complete goal  Review- how did it go?        Word Retrieval Strategies:   Visualization: try to see the thing in your head. Concentrate on the details of the picture and sometimes the word will come  Association:  Think of things that go with the word. For example, bread goes with butter, so if you are trying to think of the word "butter", you may think of the word "bread".  Gesture: use the hand motion you would use with the " "thing you are thinking of. For example, if you are thinking of the word "wash", you might make a motion as if you are washing your hands.   Description:  Describe the thing to the other person you are talking to. Even if the word does not come to you, the other person may be able to guess what you are trying to say.   First Letter or Sound:  Try to think of the first letter of the word. Sometimes you can think of the first letter even if you cannot think of the word. The letter may "lead" you to the word. You might even try going down the alphabet to find the first letter.     Goal Plan Action Review Strategy for Planning  Goal: what is your goal? What are you trying to accomplish?  Plan: what are the specific steps to get there?  Action: try to follow your plan  Review: How did it work?     ROEL Carter., IMELDA Perez, & NAINA Bowie (2012). Cognitive rehabilitation manual: Translating evidence-based recommendations into practice. [place], [place]: [facility].       Listening Strategies:  Repetition:  Tell the speaker "please repeat that" if you need to hear it again.   Processing Rate: tell the speaker "slow down" or "wait a minute" to allow you more processing time.  Explanation: tell the speaker "please explain that" If you do not understand the idea of the message.   Spelling:  Tell the speaker "please spell that for me" if you are writing the information down.   Back-channeling: tell the speaker "let me see if I understand you correctly" or "let's see if I got the sense of that" then summarize what he or she has told you.                                               "

## 2025-04-04 NOTE — PROGRESS NOTES
"  Outpatient Rehab    Physical Therapy Visit    Patient Name: Lizabeth Gomez  MRN: [id number]  YOB: 1970  Encounter Date: 4/4/2025    Therapy Diagnosis:   Encounter Diagnosis   Name Primary?    Concussion with loss of consciousness, subsequent encounter Yes       Physician: Krzysztof Mayorga MD    Physician Orders: Eval and Treat  Medical Diagnosis: Concussion with loss of consciousness, sequela  Convergence insufficiency    Visit # / Visits Authorized:  8 / 12  Insurance Authorization Period: 1/6/2025 to 1/6/2026  Date of Evaluation: 2/3/2025  Plan of Care Certification:  04/01/2025     PT/PTA: PT   Number of PTA visits since last PT visit:0  Time In: 1345   Time Out: 1424  Total Time: 39   Total Billable Time: 39    FOTO:  Intake Score:  %  Survey Score 1:  %  Survey Score 2:  %         Subjective   that she is having 8/10 dizziness to start session. 2 prior therapy sessions today did increase symptoms.  Pain reported as 9/10. left neck/shoulder region    Objective    No objective measures completed this date.         Treatment:     Patient participated in neuromuscular re-education activities to improve: Balance, Coordination, and Sense for 31 minutes. The following activities were included:     Seated VOR x1 with "X" target:   3 x 30 sec horizontal at self-selected speed for 1st trial and 50 bpm 2nd/3rd trials  3 x 30 sec vertical at self-selected speed for 1st trial and 50 bpm 2nd/3rd trials    Seated saccades with playing card targets, self-selected speed:   3 x 30 sec horizontal    Seated pencil push ups with 14 pt "X"   3 x 5 reps with 5 sec hold    Foam pad:  3 x 30" stance with eyes closed, CGA, no upper extremity support    Fitter board:  2 x 30" each lower extremity single leg stance with alternate lower extremity on fitter board, eyes closed, CGA, no upper extremity support    Patient participated in dynamic functional therapeutic activities to improve functional performance for 08 " minutes. Including:   Functional motion tolerance:  Ambulation in hallway:  2 x 100 feet ambulation with right<>left head movements, SBA  2 x 100 feet ambulation with up <> down head movements, SBA    Time Entry(in minutes):   See above    Assessment & Plan   Assessment: Lizabeth tolerated vestibular therapy session fairly well this afternoon despite high rating of dizziness at baseline. Good quality of movement noted with oculomotor exercises. Added metronome to VOR 1 with plan to progress as tolerated. Good quality of movement noted with ambulation with head movements, but motion tolerance somewhat limited by patient's request to stop motion tolerance activities due to elevated symptoms from prior 2 therapy sessions. Remainder of session focused on vision eliminated balance with good performance throughout. Continue POC to further progress ongoing vestibular deficits.       Patient will continue to benefit from skilled outpatient physical therapy to address the deficits listed in the problem list box on initial evaluation, provide pt/family education and to maximize pt's level of independence in the home and community environment.     Patient's spiritual, cultural, and educational needs considered and patient agreeable to plan of care and goals.           Plan: Progress oculomotor, motion tolerance, and balance activities as tolerated.    Goals:   Active       Long term goals        Patient to improve FOTO by atleast 5 % for improved functional mobility  (Progressing)       Start:  02/04/25    Expected End:  04/01/25            Patient to improve FGA to atleast 26/30 for improved dynamic balance and no fall risk (Progressing)       Start:  02/04/25    Expected End:  04/01/25            Patient to improve convergence to atleast 12 cm for improved reading (Progressing)       Start:  02/04/25    Expected End:  04/01/25       3/10/2025 - 29 cm         Patient able to perform at least 30 min of moderate aerobic activity  with less than or equal to 1 point increase in symptoms for improved endurance (Progressing)       Start:  02/04/25    Expected End:  03/04/25               Short term goals       Patient to perform HEP Independent  (Progressing)       Start:  02/04/25    Expected End:  03/04/25       3/10/2025 - Reports partial compliance. Reports consistence with walks with head turns, VOR to lesser degree         Patient able to perform VOR x 1 at 90 bpm with less than or equal to 1 point increase in symptoms for improved vestibular function  (Progressing)       Start:  02/04/25    Expected End:  03/04/25       3/10/2025 - unable to keep up with 90 bpm         Patient to score greater than or equal to 112/120 on mCTSIB for improved static balance (Progressing)       Start:  02/04/25    Expected End:  03/04/25                Nya Mistry, PT

## 2025-04-08 ENCOUNTER — OFFICE VISIT (OUTPATIENT)
Dept: SPORTS MEDICINE | Facility: CLINIC | Age: 55
End: 2025-04-08
Payer: COMMERCIAL

## 2025-04-08 VITALS
HEART RATE: 73 BPM | BODY MASS INDEX: 37.7 KG/M2 | WEIGHT: 212.75 LBS | DIASTOLIC BLOOD PRESSURE: 81 MMHG | SYSTOLIC BLOOD PRESSURE: 128 MMHG | HEIGHT: 63 IN

## 2025-04-08 DIAGNOSIS — Z02.6 ENCOUNTER RELATED TO WORKER'S COMPENSATION CLAIM: ICD-10-CM

## 2025-04-08 DIAGNOSIS — G89.11 ACUTE PAIN OF LEFT SHOULDER DUE TO TRAUMA: Primary | ICD-10-CM

## 2025-04-08 DIAGNOSIS — M25.512 ACUTE PAIN OF LEFT SHOULDER DUE TO TRAUMA: Primary | ICD-10-CM

## 2025-04-08 PROCEDURE — 99999 PR PBB SHADOW E&M-EST. PATIENT-LVL III: CPT | Mod: PBBFAC,,, | Performed by: ORTHOPAEDIC SURGERY

## 2025-04-08 NOTE — PROGRESS NOTES
CC: LEFT shoulder pain    Patient is here for follow up. She has discontinued PT as she was doing PT for tinnitus. Patient reports improvement with PT and is progressing. She is currently being treated for concussion by neurology.      54 y.o. Female with a  history of left shoulder traumatic pain while at work in 2024. Patient reports no pain before this injury. She reports a fall while going up stairs to the left shoulder and cervical spine.  Patient works for [employer]. Patient reports she has not been seen for cervical spine.   She has attended PT with minimal relief. PT was discontinued due to authorization. Of note patient is still recovering from concussion symptoms.     She reports that the pain and weakness is worse with overhead activity. It also bothers her at night.    Is affecting ADLs.  Pain is 8/10 at it's worst.      Past Medical History:   Diagnosis Date    Anxiety     Arthrosis of left acromioclavicular joint 2025    Depression     Eczema     Hypertension        Past Surgical History:   Procedure Laterality Date     SECTION          TONSILLECTOMY         Family History   Problem Relation Name Age of Onset    Hypertension Mother      Kidney failure Mother      Heart disease Father      Pacemaker/defibrilator Father      Multiple sclerosis Father      No Known Problems Brother      No Known Problems Brother      No Known Problems Daughter           Current Outpatient Medications:     hydroCHLOROthiazide (HYDRODIURIL) 25 MG tablet, Take 25 mg by mouth., Disp: , Rfl:     meloxicam (MOBIC) 15 MG tablet, Take 1 tablet (15 mg total) by mouth once daily., Disp: 30 tablet, Rfl: 0    mupirocin (BACTROBAN) 2 % ointment, Apply to affected area 3 times daily, Disp: 22 g, Rfl: 1    tiZANidine (ZANAFLEX) 4 MG tablet, Take 1 tablet (4 mg total) by mouth 3 (three) times daily., Disp: 20 tablet, Rfl: 0    amLODIPine (NORVASC) 10 MG tablet, Take 5 mg by mouth., Disp: , Rfl:  "    lisinopriL 10 MG tablet, Take 4 tablets by mouth once daily., Disp: , Rfl:     topiramate (TOPAMAX) 25 MG tablet, Take 1 tablet (25 mg total) by mouth 2 (two) times daily. (Patient not taking: Reported on 1/6/2025), Disp: 60 tablet, Rfl: 3    Review of patient's allergies indicates:  No Known Allergies       REVIEW OF SYSTEMS:  Constitution: Negative. Negative for chills, fever and night sweats.   HENT: Negative for congestion and headaches.    Eyes: Negative for blurred vision, left vision loss and right vision loss.   Cardiovascular: Negative for chest pain and syncope.   Respiratory: Negative for cough and shortness of breath.    Endocrine: Negative for polydipsia, polyphagia and polyuria.   Hematologic/Lymphatic: Negative for bleeding problem. Does not bruise/bleed easily.   Skin: Negative for dry skin, itching and rash.   Musculoskeletal: Negative for falls.  Positive for left shoulder pain and muscle weakness.   Gastrointestinal: Negative for abdominal pain and bowel incontinence.   Genitourinary: Negative for bladder incontinence and nocturia.   Neurological: Negative for disturbances in coordination, loss of balance and seizures.   Psychiatric/Behavioral: Negative for depression. The patient does not have insomnia.    Allergic/Immunologic: Negative for hives and persistent infections.      PHYSICAL EXAMINATION:  Vitals:  /81 (Patient Position: Sitting)   Pulse 73   Ht 5' 3" (1.6 m)   Wt 96.5 kg (212 lb 11.9 oz)   BMI 37.69 kg/m²    General: The patient is alert and oriented x 3.  Mood is pleasant.  Observation of ears, eyes and nose reveal no gross abnormalities.  No labored breathing observed.  Gait is coordinated. Patient can toe walk and heel walk without difficulty.      LEFT Shoulder / Upper Extremity Exam    OBSERVATION:     Swelling  none  Deformity  none   Discoloration  none   Scapular winging none   Scars   none  Atrophy  none    TENDERNESS / CREPITUS (T/C):      "     T/C      T/C   Clavicle   -/-  SUPRAspinatus    -/-     AC Jt.    +/-  INFRAspinatus  -/-    SC Jt.    -/-  Deltoid    -/-      G. Tuberosity  -/-  LH BICEP groove  +/-   Acromion:  -/-  Midline Neck   -/-     Scapular Spine -/-  Trapezium   -/-   SMA Scapula  -/-  GH jt. line - post  -/-     Scapulothoracic  -/-         ROM: (* = with pain)  Right shoulder   Left shoulder        AROM (PROM)   AROM (PROM)   FE    170° (175°)     100°* (115°*)     ER at 0°    60°  (65°)    40°*  (45°*)   ER at 90° ABD  90°  (90°)    90°  (90°)   IR at 90°  ABD   NA  (40°)     NA  (40°)      IR (spine level)   T10     T10    STRENGTH: (* = with pain) Right shoulder   Left shoulder    SCAPTION   5/5    4/5    IR    5/5    4/5   ER    5/5    4/5   BICEPS   5/5    4/5   Deltoid    5/5    4/5     SIGNS:  Painful side       NEER   +   OCARLOSS  +    XAVIER   +    SPEEDS  neg     DROP ARM   -   BELLY PRESS neg   Superior escape none    LIFT-OFF  neg   X-Body ADD    neg    MOVING VALGUS neg        STABILITY TESTING    Right shoulder   Left shoulder    Translation     Anterior  up face     up face    Posterior  up face    up face    Sulcus   < 10mm    < 10 mm     Signs   Apprehension   neg      neg       Relocation   no change     no change      Jerk test  neg     neg    EXTREMITY NEURO-VASCULAR EXAM:    Sensation grossly intact to light touch all dermatomal regions.    DTR 2+ Biceps, Triceps, BR and Negative Tushars sign   Grossly intact motor function at Elbow, Wrist and Hand   Distal pulses radial and ulnar 2+, brisk cap refill, symmetric.      NECK:  Painless FROM and spinous processes non-tender. Negative Spurlings sign.      OTHER FINDINGS:      IMAGING:    Left shoulder MRI -   IMPRESSION   1. Acromioclavicular osteoarthrosis with findings of subacromial impingement with subacromial subdeltoid bursitis.   2. Supraspinatus tendinosis with acute full-thickness full width tear mid fibers with tendon retraction.   Infraspinatus tendinosis with acute partial thickness partial width moderate grade articular surface tear anterior fibers.    3. Glenohumeral osteoarthrosis with joint effusion.   4. Biceps tenosynovitis.   5. Superior glenoid labral tear.   6. Linear stellate nondisplaced fracture/microfracture pattern of bone marrow edema and contusion, bone bruise in the humeral head and neck.       Cervical spine MRI -  IMPRESSION  1.  Straightening of the cervical spine with degenerative disc changes seen at C4-C5 and C5-C6.    2.  Mild cervical stenosis C4-C5 and C5-C6 with findings as discussed above.    3.  No significant foraminal restriction evident in the cervical region.       ASSESSMENT:   Left shoulder pain, possible:  1. Acute pain of left shoulder due to trauma    2. Encounter related to worker's compensation claim          Plan:  Continue PT for shoulder as she is seeing improvement.   2. F/u in 8 weeks   3. Return to work light duty no lifting more than 10 pounds.

## 2025-04-10 ENCOUNTER — CLINICAL SUPPORT (OUTPATIENT)
Dept: REHABILITATION | Facility: HOSPITAL | Age: 55
End: 2025-04-10
Payer: COMMERCIAL

## 2025-04-10 DIAGNOSIS — M54.2 NECK PAIN: Primary | ICD-10-CM

## 2025-04-10 PROCEDURE — 97110 THERAPEUTIC EXERCISES: CPT | Mod: PO,CQ

## 2025-04-10 PROCEDURE — 97112 NEUROMUSCULAR REEDUCATION: CPT | Mod: PO,CQ

## 2025-04-10 PROCEDURE — 97140 MANUAL THERAPY 1/> REGIONS: CPT | Mod: PO,CQ
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
## 2025-04-15 ENCOUNTER — CLINICAL SUPPORT (OUTPATIENT)
Dept: REHABILITATION | Facility: HOSPITAL | Age: 55
End: 2025-04-15
Payer: COMMERCIAL

## 2025-04-15 DIAGNOSIS — M54.2 NECK PAIN: Primary | ICD-10-CM

## 2025-04-15 PROCEDURE — 97140 MANUAL THERAPY 1/> REGIONS: CPT | Mod: PO

## 2025-04-15 PROCEDURE — 97110 THERAPEUTIC EXERCISES: CPT | Mod: PO

## 2025-04-15 NOTE — PROGRESS NOTES
"    Outpatient Rehab    Physical Therapy Visit    Patient Name: Lizabeth Gomez  MRN: [id number]  YOB: 1970  Encounter Date: 4/15/2025    Therapy Diagnosis:   Encounter Diagnosis   Name Primary?    Neck pain Yes     Physician: Krzysztof Mayorga MD    Physician Orders: Eval and Treat  Medical Diagnosis: Whiplash injury to neck, subsequent encounter  Cervicalgia of rhlpebha-vxtcpih-qwxoo region  Cervicogenic headache    Visit # / Visits Authorized:  5 / 16  Insurance Authorization Period: 3/20/2025 to 3/20/2026  Date of Evaluation: 3/28/2025   Plan of Care Certification: 3/28/2025 to 5/28/2025      PT/PTA:     Number of PTA visits since last PT visit:   Time In: 1612   Time Out: 1658  Total Time: 46   Total Billable Time: 30    FOTO:  Intake Score:  %  Survey Score 1:  %  Survey Score 2:  %         Subjective   Pt reported that her neck is sore on the L and posteriorly today..         Objective            Treatment:  Therapeutic Exercise  TE 1: Seated Cx spine rotation 5 x 5 seconds to R & L  TE 2: Seated Cx spine side bending 5 x 5 seconds to R & L  TE 3: seated trunk rotation 10x5"  TE 4: Table slides into flexion/extension and scaption as tolerated 10 x 3 each  Manual Therapy  MT 1: Soft tissue mobilization to L Cx spine paraspinals, upper trap and L thoracic paraspinals  MT 2: Manual Cx traction  MT 3: Seated L thoracic spine and rib cage passive mobilization  MT 4: Passive mobilization L GH joint  Balance/Neuromuscular Re-Education  NMR 6: Scapular retractions 10 x 2  NMR 7: Shoulder shrugs 10 x 2  NMR 8: Seated rows 10 x 2 with green TB - decrease resistance next Tx    Time Entry(in minutes):  Manual Therapy Time Entry: 15  Therapeutic Activity Time Entry: 15  Therapeutic Exercise Time Entry: 10    Assessment & Plan   Assessment: Lizabeth presents to Tx today reporting tightness in the L side of her neck.  she reported improvement following manual Cx spine traction and soft tissue mobilization to " the L latral Cx spine musculature.  She was able to tolerate all exercises, but reported increased L shoulder discomfort with seated rows with green TB - she may be able to tolerate less resistance next visit.  She also reported some discomfort with seated L thoracic spine adn rib cage and L GH joint passive mobilization.       Patient will continue to benefit from skilled outpatient physical therapy to address the deficits listed in the problem list box on initial evaluation, provide pt/family education and to maximize pt's level of independence in the home and community environment.     Patient's spiritual, cultural, and educational needs considered and patient agreeable to plan of care and goals.           Plan: continue to progress per PT POC    Goals:   concussion Problems       concussion Problems (Active)       Long term goals        Patient to improve FOTO by atleast 5 % for improved functional mobility  (Progressing)       Start:  02/04/25    Expected End:  04/01/25            Patient to improve FGA to atleast 26/30 for improved dynamic balance and no fall risk (Progressing)       Start:  02/04/25    Expected End:  04/01/25            Patient to improve convergence to atleast 12 cm for improved reading (Progressing)       Start:  02/04/25    Expected End:  04/01/25       3/10/2025 - 29 cm         Patient able to perform at least 30 min of moderate aerobic activity with less than or equal to 1 point increase in symptoms for improved endurance (Progressing)       Start:  02/04/25    Expected End:  03/04/25               Short term goals       Patient to perform HEP Independent  (Progressing)       Start:  02/04/25    Expected End:  03/04/25       3/10/2025 - Reports partial compliance. Reports consistence with walks with head turns, VOR to lesser degree         Patient able to perform VOR x 1 at 90 bpm with less than or equal to 1 point increase in symptoms for improved vestibular function  (Progressing)        Start:  02/04/25    Expected End:  03/04/25       3/10/2025 - unable to keep up with 90 bpm         Patient to score greater than or equal to 112/120 on mCTSIB for improved static balance (Progressing)       Start:  02/04/25    Expected End:  03/04/25                 Outpatient Rehab - Rehab - Physical Therapy - March 2025 Problems       Outpatient Rehab - Rehab - Physical Therapy - March 2025 Problems (Active)       Long term goals       Pt will be independent in a HEP to assist in managing their Neck and shoulder Sx.         Start:  03/28/25    Expected End:  05/28/25            Improve Cx spine ROM by 20 degrees       Start:  03/28/25    Expected End:  05/28/25            Improve L shoulder ROM to 145 degrees of flexion, 110 degrees of abduction, 45 degrees of IR and ER at 90 degrees of abduction       Start:  03/28/25    Expected End:  05/28/25            Pt will be compliant with her Home exercise program       Start:  03/28/25    Expected End:  05/28/25            Pt will report improved function through an improved score on the FOTO Neck survey       Start:  03/28/25    Expected End:  05/28/25               Short term goals       Pt will be instructed in an exercise program to address functional deficits related to her neck Sx       Start:  03/28/25    Expected End:  04/28/25            Improve Cx spine ROM by 10 degrees       Start:  03/28/25    Expected End:  04/28/25            Improve L shoulder ROM to 110 degrees of flexion, 90 degrees of abduction, 25 degrees of IR and ER at 90 degrees of abduction       Start:  03/28/25    Expected End:  04/28/25            Pt will be compliant with her Physical Therapy appointments       Start:  03/28/25    Expected End:  04/28/25                  Cervicalgia Problems        Cervicalgia Problems (Active)       Long term goals       Pt will be independent in a HEP to assist in managing their neck and L shoulder Sx.   (Progressing)       Start:  03/28/25     Expected End:  05/28/25            Improve Cx spine ROM by 20 degrees (Progressing)       Start:  03/28/25    Expected End:  05/28/25            Improve L shoulder ROM to 145 degrees of flexion, 110 degrees of abduction and 45 degrees of IR and ER at 90 degrees of abduction (Progressing)       Start:  03/28/25    Expected End:  05/28/25            Pt will report improved function through an improved score on the FOTO Neck survey (Progressing)       Start:  03/28/25    Expected End:  05/28/25            Pt will return to work full time/full duty (Progressing)       Start:  03/28/25    Expected End:  05/28/25               Short term goals       Pt will be instructed in an exercise program to address functional deficits related to  neck Sx (Progressing)       Start:  03/28/25    Expected End:  04/28/25            Improve Cx spine ROM by 10 degrees (Progressing)       Start:  03/28/25    Expected End:  04/28/25            Improve L shoulder ROM to 110 degrees of flexion, 90 degrees of abduction and 25 degrees of IR and ER at 90 degrees of abduction (Progressing)       Start:  03/28/25    Expected End:  04/28/25            Pt will be compliant with her Physical Therapy appointments (Progressing)       Start:  03/28/25    Expected End:  04/28/25                   José Manuel Ortega, PT

## 2025-04-21 ENCOUNTER — CLINICAL SUPPORT (OUTPATIENT)
Dept: REHABILITATION | Facility: HOSPITAL | Age: 55
End: 2025-04-21
Payer: COMMERCIAL

## 2025-04-21 DIAGNOSIS — R41.841 COGNITIVE COMMUNICATION DEFICIT: Primary | ICD-10-CM

## 2025-04-21 PROCEDURE — 97129 THER IVNTJ 1ST 15 MIN: CPT | Mod: PO

## 2025-04-21 PROCEDURE — 97130 THER IVNTJ EA ADDL 15 MIN: CPT | Mod: PO

## 2025-04-21 NOTE — PROGRESS NOTES
Outpatient Rehab    Speech-Language Pathology Visit    Patient Name: Lizabeth Gomez  MRN: [id number]  YOB: 1970  Encounter Date: 4/21/2025    Therapy Diagnosis:   Encounter Diagnosis   Name Primary?    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  Pain reported as 0/10.  Concussion occurred in September 2024.      Objective            Treatment       Time Entry(in minutes):  Cognitive Skill Development Time Entry: 15  Cognitive Skill Development (Medicare) Time Entry: 30    Assessment & Plan   Assessment  Patient displayed excellent performance on tasks today when cognitive load was 2 to 3/7. Mental manipulation tasks completed with good performance once she understood the instructions.  Evaluation/Treatment Tolerance: Patient tolerated treatment well    Patient will continue to benefit from skilled outpatient speech therapy to address the deficits listed in the problem list box on initial evaluation, provide pt/family education and to maximize pt's level of independence in the home and community environment.     Patient's spiritual, cultural, and educational needs considered and patient agreeable to plan of care and goals.     Education  Education was done with Patient. The patient's learning style includes Listening. The patient Demonstrates understanding and Verbalizes understanding.         Discussed mindfulness and taking  cognitive/sensory rest breaks when feeling overwhelmed and overstimulated.         Plan  continue POC 1 time per week for 8 weeks          Goals:   Active       Long Term Goals        1 Patient will improve  attention skills to effectively attend to and communicate in complex daily living tasks in functional living environment.          Start:  04/04/25    Expected End:  05/30/25            2 Patient will use appropriate memory strategies to schedule and recall weekly activities, express needs and recall names to maintain safety and participate socially in functional living environment.          Start:  04/04/25    Expected End:  05/30/25            3 Patient will demonstrate use of self awareness,  goal setting, planning,  initiation, and  self-monitoring during daily living activities to improve safety and awareness in functional living environment         Start:  04/04/25    Expected End:  05/30/25            4 Patient will develop functional cognitive-linguistic based skills/utilize compensatory strategies to communicate wants/needs effectively to different conversational partners, maintain safety, participate socially in functional living environment.           Start:  04/04/25    Expected End:  05/30/25               Short Term Goals       1 Patient will complete tasks requiring alternating attention with 90% accuracy given min cues to improve attention skills.    Constant Therapy - (alternate uppercase/lowercase words in alphabetical order) L1- 93% accuracy independently      Patient reported having difficulty alternating her attention at work when given several things at the same time. She becomes overwhelmed and overstimulated.    Start:  04/04/25    Expected End:  05/02/25            2 Patient will immediately recall specific details from information recently seen or heard using memory retrieval strategies with 90% accuracy given min cues to improve auditory and visual recall.       Introduced memory  strategies - WRAP (write it, repeat it, associate it, picture it) Patient reported that she writes down information to help her remember.    Start:  04/04/25    Expected End:  05/02/25            3 Patient will complete 3-4 unit mental manipulation tasks with 90% accuracy given min cues to improve working memory/mental flexibility.       Cognitive load 3/7, patient repeated words in alphabetical order : 3 words and 4 words -100% accuracy independently. Patient unscrambled sentences verbally with 4 words- 90% accuracy independently, 5 words - 100% accuracy independently     Constant Therapy - (alternate uppercase/lowercase words in alphabetical order) L1- 93% accuracy independently Cognitive load 2/7    Start:  04/04/25    Expected End:  05/02/25            4 Patient will complete simple - moderately complex planning/organization/reasoning tasks with 90% accuracy given min cues to improve executive functioning skills.     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    Start:  04/04/25    Expected End:  05/02/25            5 Patient will complete word finding tasks with semantic relationships (I.e. Similarities and differences, synonyms, antonyms, semantic category) with 90% acc independently to improve word finding.     No word finding difficulty noted in conversation.    Start:  04/04/25    Expected End:  05/02/25                FRANNIE Goodson., L-SLP,CCC-SLP, CBIS  Speech-Language Pathologist  Certified Brain Injury Specialist

## 2025-04-21 NOTE — PATIENT INSTRUCTIONS
What is mindfulness?    Mindfulness is the basic human ability to be fully present, aware of where we are and what we're doing, and not overly reactive or overwhelmed by what's going on around us.     While mindfulness is something we all naturally possess, it's more readily available to us when we practice on a daily basis.     Whenever you bring awareness to what you're directly experiencing via your senses, or to your state of mind via your thoughts and emotions, you're being mindful. There is growing research showing that when you train your brain to be mindful, you're actually remodeling the physical structure of your brain.    When we meditate we venture into the workings of our minds: our sensations (air blowing on our skin or a harsh smell in the room), our emotions (love this, hate that, crave this, loathe that) and our thoughts. Mindfulness meditation asks us to get rid of judgement and open up to the workings of our minds with kindness to ourselves and others.    What you need to know before practicing mindfulness:    1. You don't need to buy anything. You can practice anywhere, there's no need to go out and buy a special cushion or bench - all you need to do is devote a little time and space to accessing your mindfulness skills every day.  2. There's no way to quiet your mind. That's not the goal here. All you're trying to do is pay attention to the present moment, without judgement.  3. Your mind will wander. As you practice paying attention to what's going on in your body and mind at the present moment, you'll find that many thoughts arise. Your mind might drift to something that happened yesterday or last week - your mind will try to be anywhere but where you are. But the wandering mind isn't something to fear, it's part of human nature and it provides the magic moment for the essential piece of mindfulness practice - the piece that researchers believe leads to healthier, more agile brains: the moment  "when you recognize that your mind has wandered. Because if you can notice that your mind has wandered, then you can consciously bring it back to the present moment. The more you do this, the more likely you are to be able to do it again and again.   4. Your brain will try to take over. The second part of the puzzle is the "without judgment" part. We're all guilty of listening to the critic in our heads a little more than we should. But, when we practice investigating our judgements and diffusing them, we can learn to choose how we look at things and react to them. When you practice mindfulness, try not to  yourself for whatever thoughts pop up. Notice judgements arise, make a mental note of them, and let them pass, recognizing the sensations they might leave in your body, and letting those pass as well.  5. It's all about returning your attention again and again to the present moment. It seems like our minds are wired to get carried away in thought. That's why mindfulness is the practice of returning, again and again, to the breath. We use the sensation of the breath as an anchor to the present moment. Every time we return to the breath, we reinforce our ability to do it again.    How do I practice mindfulness and meditation?    Mindfulness is available to us in every moment, whether through meditations and body scans, or mindful moment practices like taking time to pause and breathe when the phone rings instead of rushing to answer it. Mindfulness helps us put some space between ourselves and our reactions, breaking down our conditioned responses. Here's how to tune into mindfulness throughout the day:     1. Take a seat. Find a place to sit that feels calm and quiet to you.  2. Set a time limit. If you're just breathing, it can help to choose a short time, such as 5 or 10 minutes.  3. Notice your body. You can sit in a chair with your feet on the floor, you can sit loosely cross-legged, you can kneel - all " are fine. Just make sure you are stable and in a position you can stay in for awhile.  4. Feel your breath. Follow the sensation of your breath as it goes out and as it goes in.  5. Notice when your mind has wandered. Inevitably, your attention will leave the sensations of the breath and wander to other places. When you get around to noticing this - in a few seconds, a minute, five minutes - simply return your attention to the breath.   6. Be kind to your wandering mind. Don't  yourself or obsess over the content of the thoughts you find yourself lost in. Just come back.               Mindfulness & Relaxation Resources   Phone applications:    Insight timer: Free to download, free content or paid option $59.99/year   Smiling mind: Free to download, free content    Breathe: Free to download, free content    Calm: Free to download, free content/version or 7- day free trial for premium and then paid option $69.99/year   Headspace: Free year trial if unemployed or 2- week free trial and then $69.99/year   University Based Websites/Links:   [facility]: Free online resource with guided audio and video mindfulness/meditation recordings as well as yoga videos. Follow the link below to access:    [email or web address]   [facility]: Free online resource with guided meditations and scripts; [facility] mindfulness phone ruben also available to download. Follow the link below to access:    [email or web address]   JAYLE[employer]: Free online resource with guided meditations, autogenics, and visualizations. Follow the link below to access:   [email or web address]   Other Web-based Resources:    The Free Mindfulness Project: created to develop a collection of free to download mindfulness-based exercises in a centralized location. Follow the link below to access:    http://www.freemindfulness.org/   NetotiateTube: A great tool to search  for free videos of whichever mindfulness techniques suit you best. Follow the below link to access the [employer] YouTube page with guided meditations, breathing techniques, yoga flows and more:   [email or web address]   Guided mindfulness meditation with [employer]: mp3 series and books available to purchase, as well as daily meditations/dialogue. Follow the link below to access:   [email or web address]

## 2025-04-25 ENCOUNTER — DOCUMENTATION ONLY (OUTPATIENT)
Dept: REHABILITATION | Facility: HOSPITAL | Age: 55
End: 2025-04-25
Payer: COMMERCIAL

## 2025-04-25 NOTE — PROGRESS NOTES
Missed Visit/Cancellation      Date: 4/25/25        Canceled Number: 0  No Show Number: 1                                                                                                                Pt initially had visit scheduled for 8:45 am on 4/25/25 for physical therapy. She did not call to cancel or reschedule and was contacted by the . Informed us that she did not have this appointment marked on her calendar. This is her first no show visit.     Reason for cancellation: N/A.     Pt's next scheduled physical therapy visit is 5/6/2025.

## 2025-04-29 ENCOUNTER — OFFICE VISIT (OUTPATIENT)
Dept: URGENT CARE | Facility: CLINIC | Age: 55
End: 2025-04-29
Payer: COMMERCIAL

## 2025-04-29 ENCOUNTER — CLINICAL SUPPORT (OUTPATIENT)
Dept: REHABILITATION | Facility: HOSPITAL | Age: 55
End: 2025-04-29
Payer: COMMERCIAL

## 2025-04-29 VITALS
RESPIRATION RATE: 20 BRPM | TEMPERATURE: 98 F | HEIGHT: 63 IN | BODY MASS INDEX: 37.5 KG/M2 | SYSTOLIC BLOOD PRESSURE: 147 MMHG | DIASTOLIC BLOOD PRESSURE: 87 MMHG | OXYGEN SATURATION: 98 % | WEIGHT: 211.63 LBS | HEART RATE: 72 BPM

## 2025-04-29 DIAGNOSIS — S16.1XXD CERVICAL MUSCLE STRAIN, SUBSEQUENT ENCOUNTER: ICD-10-CM

## 2025-04-29 DIAGNOSIS — S46.812S TRAUMATIC RUPTURE OF SUPRASPINATUS TENDON OF LEFT SHOULDER, SEQUELA: ICD-10-CM

## 2025-04-29 DIAGNOSIS — M75.22 BICEPS TENDONITIS ON LEFT: ICD-10-CM

## 2025-04-29 DIAGNOSIS — S06.0X0S CONCUSSION WITHOUT LOSS OF CONSCIOUSNESS, SEQUELA: Primary | ICD-10-CM
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
## 2025-04-29 DIAGNOSIS — M50.922: ICD-10-CM

## 2025-04-29 DIAGNOSIS — S42.292S: ICD-10-CM

## 2025-04-29 DIAGNOSIS — M54.2 CERVICALGIA: ICD-10-CM

## 2025-04-29 DIAGNOSIS — R41.841 COGNITIVE COMMUNICATION DEFICIT: Primary | ICD-10-CM

## 2025-04-29 DIAGNOSIS — M50.821 OTHER CERVICAL DISC DISORDERS AT C4-C5 LEVEL: ICD-10-CM

## 2025-04-29 DIAGNOSIS — Z02.6 ENCOUNTER RELATED TO WORKER'S COMPENSATION CLAIM: ICD-10-CM

## 2025-04-29 PROCEDURE — 97129 THER IVNTJ 1ST 15 MIN: CPT | Mod: PO

## 2025-04-29 PROCEDURE — 97130 THER IVNTJ EA ADDL 15 MIN: CPT | Mod: PO

## 2025-04-29 RX ORDER — TIZANIDINE 4 MG/1
4 TABLET ORAL NIGHTLY PRN
Qty: 20 TABLET | Refills: 0 | Status: SHIPPED | OUTPATIENT
Start: 2025-04-29

## 2025-04-29 NOTE — LETTER
[facility] [address] [address]  Phone: [phone]  Fax: [phone]  [facility]: [phone]    Pt Name: Lizabeth Gomez  Injury Date:     Employee ID: [id number] Date of Treatment: 04/29/2025   Company: [employer]      Appointment Time: 10:20 AM Arrived: 10:45 AM   Provider: Moreno Zafar PA-C Time Out: 12:12 PM     Office Treatment:   1. Concussion without loss of consciousness, sequela    2. Encounter related to worker's compensation claim    3. Cervicalgia    4. Traumatic rupture of supraspinatus tendon of left shoulder, sequela    5. Biceps tendonitis on left    6. Fracture of humeral head, closed, left, sequela    7. Work related injury    8. Other cervical disc disorders at C4-C5 level    9. Cervical muscle strain, subsequent encounter    10. Disorder of intervertebral disc at C5-C6 level      Medications Ordered This Encounter   Medications    tiZANidine (ZANAFLEX) 4 MG tablet      Patient Instructions: Attention not to aggravate affected area, Daily home exercises/warm soaks, Continue Physical Therapy (Continue speech therapy.  Follow-up with orthopedics and neurology as scheduled.)      Restrictions: No lifting/pushing/pulling more than 10 lbs     Return Appointment: 7/1/2025 at 10:30 AM    TERE

## 2025-04-29 NOTE — PROGRESS NOTES
Subjective:      Patient ID: Lizabeth Gomez is a 54 y.o. female.    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  She reports improvement since last office visit.  Patient is being followed by orthopedics for her shoulder and neurology for concussion.  She is currently in physical therapy and speech therapy.  Patient states speech therapy is helping with memory and cognition.  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  She continues to have intermittent headaches, dizziness and balance issues.  She is doing physical therapy for vestibular training as well as cervical spine and left shoulder.  Patient states neurologist and orthopedist are coordinating care.  Says she will likely need surgery for the left shoulder however her orthopedist does not want to proceed until concussive symptoms have resolved. MEB      Headache   Associated symptoms include back pain, dizziness, a loss of balance, neck pain, numbness and photophobia. Pertinent negatives include no blurred vision, nausea or vomiting. There is no history of migraine headaches.       Constitution: Positive for activity change.   HENT:  Positive for facial trauma.         Phonophobia   Neck: Positive for neck pain and neck stiffness.   Cardiovascular:  Negative for chest trauma.   Eyes:  Positive for photophobia. Negative for vision loss, double vision and blurred vision.   Respiratory:  Negative for shortness of breath.    Gastrointestinal:  Negative for nausea and vomiting.   Musculoskeletal:  Positive for pain, trauma, joint pain, abnormal  ROM of joint and back pain.   Skin:  Negative for wound.   Neurological:  Positive for dizziness, loss of balance, headaches, numbness and tingling. Negative for history of migraines.   Psychiatric/Behavioral:  Positive for sleep disturbance. Negative for history of mental illness.      Objective:     Physical Exam  Vitals and nursing note reviewed.   Constitutional:       General: She is not in acute distress.     Appearance: Normal appearance. She is well-developed. She is not ill-appearing, toxic-appearing or diaphoretic.   HENT:      Head: Normocephalic and atraumatic.      Jaw: No trismus or pain on movement.      Right Ear: Hearing, tympanic membrane, ear canal and external ear normal.      Left Ear: Hearing, tympanic membrane, ear canal and external ear normal.      Nose: Nose normal. No nasal deformity, mucosal edema or rhinorrhea.      Right Sinus: No maxillary sinus tenderness or frontal sinus tenderness.      Left Sinus: No maxillary sinus tenderness or frontal sinus tenderness.      Mouth/Throat:      Dentition: Normal dentition.      Pharynx: Uvula midline. No posterior oropharyngeal erythema or uvula swelling.   Eyes:      General: Lids are normal. Vision grossly intact. No scleral icterus.     Extraocular Movements: Extraocular movements intact.      Conjunctiva/sclera: Conjunctivae normal.      Pupils: Pupils are equal, round, and reactive to light.      Comments: Sclera clear bilat   Neck:      Trachea: Trachea normal.   Cardiovascular:      Rate and Rhythm: Normal rate and regular rhythm.      Pulses: Normal pulses.      Heart sounds: Normal heart sounds.   Pulmonary:      Effort: Pulmonary effort is normal. No respiratory distress.      Breath sounds: Normal breath sounds.   Musculoskeletal:         General: No deformity.      Right shoulder: Normal pulse.      Left shoulder: Tenderness present. No deformity. Decreased range of motion. Decreased strength. Normal pulse.        Arms:       Cervical  back: Neck supple. Spasms and tenderness present. No swelling, deformity or rigidity. Pain with movement present. Decreased range of motion.      Thoracic back: No tenderness.      Lumbar back: Tenderness present. Decreased range of motion. Negative right straight leg raise test and negative left straight leg raise test.        Back:       Comments: Left shoulder demonstrates markedly reduced range of motion.  Active range of motion: Flexion 90°, abduction 60°, unable to determine internal rotation as patient can not put her hand behind her back.       Skin:     General: Skin is warm and dry.      Coloration: Skin is not pale.   Neurological:      General: No focal deficit present.      Mental Status: She is alert and oriented to person, place, and time. She is not disoriented.      Cranial Nerves: Cranial nerves 2-12 are intact. No cranial nerve deficit.      Sensory: Sensation is intact.      Motor: Motor function is intact. No weakness or abnormal muscle tone.      Coordination: Coordination is intact. Romberg sign negative. Coordination normal. Finger-Nose-Finger Test normal. Rapid alternating movements normal.      Gait: Gait is intact. Gait and tandem walk normal.   Psychiatric:         Attention and Perception: Attention normal.         Mood and Affect: Mood normal.         Speech: Speech normal.         Behavior: Behavior normal. Behavior is cooperative.        Assessment:      1. Concussion without loss of consciousness, sequela    2. Encounter related to worker's compensation claim    3. Cervicalgia    4. Traumatic rupture of supraspinatus tendon of left shoulder, sequela    5. Biceps tendonitis on left    6. Fracture of humeral head, closed, left, sequela    7. Work related injury    8. Other cervical disc disorders at C4-C5 level    9. Cervical muscle strain, subsequent encounter    10. Disorder of intervertebral disc at C5-C6 level      Plan:     Patient is being managed by Neurology for concussion and  occipital neuritis.  She is also being followed by Orthopedics for left shoulder injury.  Patient to continue physical therapy for shoulder, vestibular and neck.  She is also conducting speech therapy for cognition.  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  Overall patient is making progress.  Continue light duty.  Return to clinic in 2 months or sooner as needed.  Patient verbalized understanding and agreement.    Medications Ordered This Encounter   Medications    tiZANidine (ZANAFLEX) 4 MG tablet     Sig: Take 1 tablet (4 mg total) by mouth nightly as needed (muscle spasm).     Dispense:  20 tablet     Refill:  0     Patient Instructions: Attention not to aggravate affected area, Daily home exercises/warm soaks, Continue Physical Therapy (Continue speech therapy.  Follow-up with orthopedics and neurology as scheduled.)   Restrictions: No lifting/pushing/pulling more than 10 lbs  Follow up in about 9 weeks (around 7/1/2025) for Reassessment.

## 2025-04-29 NOTE — PROGRESS NOTES
Outpatient Rehab    Speech-Language Pathology Visit    Patient Name: Lizabeth Gomez  MRN: [id number]  YOB: 1970  Encounter Date: 4/29/2025    Therapy Diagnosis:   Encounter Diagnosis   Name Primary?    Cognitive communication deficit Yes     Physician: Krzysztof Mayorga MD    Physician Orders: Eval and Treat  Medical Diagnosis: Concussion with loss of consciousness, sequela  Cognitive change    Visit # / Visits Authorized: 2/ 12 (plus evaluation)  Insurance Authorization Period: 1/6/2025 to 1/6/2026  Date of Evaluation: 4/4/2025   Plan of Care Certification: 4/4/25 to 5/30/25     Time In: 0845   Time Out: 0940  Total Time: 55   Total Billable Time: 55    FOTO:  Intake Score: 38.7 %  Survey Score 1:  %  Survey Score 2:  %         Subjective   Patient is noticing improvements.  Pain reported as 6/10. left shoulderConcussion occurred in September 2024.      Objective            Treatment       Time Entry(in minutes):  Cognitive Skill Development Time Entry: 25  Cognitive Skill Development (Medicare) Time Entry: 30    Assessment & Plan   Assessment  Patient completed alternating attention task by completing a deduction puzzle and alternating symbols. She initially needed verbal cues to demonstrate and understand the directions for both tasks. Her performance on the deduction puzzle was 82% accuracy independently and 100% accuracy given minimum cues. Alternating symbols task was better once she understood the task and she completed independently.  Evaluation/Treatment Tolerance: Patient tolerated treatment well    Patient will continue to benefit from skilled outpatient speech therapy to address the deficits listed in the problem list box on initial evaluation, provide pt/family education and to maximize pt's level of independence in the home and community environment.     Patient's spiritual, cultural, and educational needs considered and patient agreeable to plan of care and goals.             Plan  continue POC 1 time per week          Goals:   Active       Long Term Goals        1 Patient will improve  attention skills to effectively attend to and communicate in complex daily living tasks in functional living environment.          Start:  04/04/25    Expected End:  05/30/25            2 Patient will use appropriate memory strategies to schedule and recall weekly activities, express needs and recall names to maintain safety and participate socially in functional living environment.          Start:  04/04/25    Expected End:  05/30/25            3 Patient will demonstrate use of self awareness,  goal setting, planning,  initiation, and  self-monitoring during daily living activities to improve safety and awareness in functional living environment         Start:  04/04/25    Expected End:  05/30/25            4 Patient will develop functional cognitive-linguistic based skills/utilize compensatory strategies to communicate wants/needs effectively to different conversational partners, maintain safety, participate socially in functional living environment.           Start:  04/04/25    Expected End:  05/30/25               Short Term Goals       1 Patient will complete tasks requiring alternating attention with 90% accuracy given min cues to improve attention skills.    Patient alternated between 2 tasks every 2 minutes:  Task 1: Patient completed deduction puzzle task with 82% accuracy independently and 100% accuracy given minimum cues  Task 2: Constant Therapy - (alternate symbols) L6- 85 % accuracy given minimum cues for understanding directions.      Cognitive load: 4/7 for deduction puzzle; 6/7 for alternating symbols     Start:  04/04/25    Expected End:  05/02/25   extend to 5/30/25         2 Patient will immediately recall specific details from information recently seen or heard using memory retrieval strategies with 90% accuracy given min cues to improve auditory and visual recall.        Introduced memory strategies - WRAP (write it, repeat it, associate it, picture it) Patient reported that she writes down information to help her remember.      Start:  04/04/25    Expected End:  05/02/25   extend to 5/30/25         3 Patient will complete 3-4 unit mental manipulation tasks with 90% accuracy given min cues to improve working memory/mental flexibility.       Patient alternated between 2 tasks every 2 minutes:  Task 1: Patient completed deduction puzzle task  with82% accuracy independently and 100% accuracy given minimum cues. She talked aloud as a strategy  Task 2: Constant Therapy - (alternate symbols) L6- 85 % accuracy given minimum cues for understanding directions.      Cognitive load: 4/7 for deduction puzzle; 6/7 for alternating and the end 6/7 for deduction puzzle and 2/7 for alternating symbols    Start:  04/04/25    Expected End:  05/02/25   extend to 5/30/25         4 Patient will complete simple - moderately complex planning/organization/reasoning tasks with 90% accuracy given min cues to improve executive functioning skills.    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  Patient completed deduction puzzle task  with 82% accuracy independently and 100% accuracy given minimum cues     No difficulty in this area. Goal met/discontinue 4/42/25     Start:  04/04/25    Expected End:  05/02/25 Goal met/discontinue 4/42/25           5 Patient will complete word finding tasks with semantic relationships (I.e. Similarities and differences, synonyms, antonyms, semantic category) with 90% acc independently to improve word finding.     No word finding difficulty noted in conversation. When given time she was able to find her words yesterday in conversation.     Start:  04/04/25    Expected End:  05/02/25                FRANNIE Goodson., L-SLP,CCC-SLP, CBIS  Speech-Language Pathologist  Certified Brain Injury Specialist

## 2025-05-06 NOTE — PROGRESS NOTES
Outpatient Rehabilitation   Speech-Language Pathology Visit    Patient Name: Lizabeth Gomez  MRN: [id number]  YOB: 1970  Encounter Date: 5/7/2025    Therapy Diagnosis:   Encounter Diagnosis   Name Primary?    Cognitive communication deficit Yes     Physician: Krzysztof Mayorga MD    Physician Orders: Eval and Treat  Medical Diagnosis: Concussion with loss of consciousness, sequela  Cognitive change    Visit # / Visits Authorized: 2/12 (plus evaluation)  Insurance Authorization Period: 1/6/2025 to 1/6/2026  Date of Evaluation: 4/4/2025   Plan of Care Certification: 4/4/25 to 5/30/25     Time In: 1513   Time Out: 1556  Total Time (in minutes): 43   Total Billable Time (in minutes): 43 minutes     FOTO:  Intake Score: 38.7%   Survey Score 2:  %  Survey Score 3:  %    Precautions          Standard    Subjective   Patient arrived to scheduled speech therapy session on time in pleasant spirits with no complaints or concerns stated. Patient motivated to participate in skilled speech therapy services.   No changes or updates..  Pain reported as 0/10.      Objective        Treatment     1.  Patient will complete tasks requiring alternating attention with 90% accuracy given min cues to improve attention skills.   - Constant Therapy find alternating words level 2 completed with 94% accuracy independently METx2    2. Patient will immediately recall specific details from information recently seen or heard using memory retrieval strategies with 90% accuracy given min cues to improve auditory and visual recall.   - Not formally addressed      3. Patient will complete 3-4 unit mental manipulation tasks with 90% accuracy given min cues to improve working memory/mental flexibility.   - Constant Therapy remember a pattern (4 unit visual mental manipulation task) completed with 100% accuracy independently  - Constant Therapy remember a pattern (5 unit visual mental manipulation task) completed with 89% accuracy  independently    4. Patient will complete simple - moderately complex planning/organization/reasoning tasks with 90% accuracy given min cues to improve executive functioning skills.   - Patient completed a problem-solving and reasoning task focused on assigning groups for a children's soccer program based on multiple constraints. This activity involved organizing participants into four teams based on their age, experience level, and familial relationships, utilizing a set of clues to determine appropriate assignments. The task was designed to target executive functioning, including skills such as deductive reasoning, cognitive flexibility, working memory, and attention to detail. The patient was required to analyze and integrate complex information, while simultaneously managing multiple variables to arrive at a logical solution. Patient completed task with 90% accuracy independently; 100% accuracy given min cues/chance to self review. Patient rated the task a 5 on The Relative Scale of Cognitive Load. METx1    5. Patient will complete word finding tasks with semantic relationships (I.e. Similarities and differences, synonyms, antonyms, semantic category) with 90% acc independently to improve word finding.   - No apparent word finding difficulty noted at conversation level       Time Entry(in minutes):  Cognitive Skill Development Time Entry: 28  Cognitive Skill Development (Medicare) Time Entry: 15 Minutes     Assessment & Plan   Assessment  Lizabeth participated well today in today's session. Good performance across entirety of structured tasks today. Patient endorses that she feels like she continues to improve. Cognitive, Physical, and Emotional fatigue was not believed to have been a barrier to the session. To date, Lizabeth has partially met 2 goals.  Evaluation/Treatment Tolerance: Patient tolerated treatment well    Patient will continue to benefit from skilled outpatient speech therapy to address the deficits  listed in the problem list box on initial evaluation, provide pt/family education and to maximize pt's level of independence in the home and community environment.     Patient's spiritual, cultural, and educational needs considered and patient agreeable to plan of care and goals.     Education  Education was done with Patient. The patient's learning style includes Listening. The patient Verbalizes understanding.              Plan: continue POC 1 time per week             Goals:   Active       Long Term Goals        1 Patient will improve  attention skills to effectively attend to and communicate in complex daily living tasks in functional living environment.    (Progressing)       Start:  04/04/25    Expected End:  05/30/25            2 Patient will use appropriate memory strategies to schedule and recall weekly activities, express needs and recall names to maintain safety and participate socially in functional living environment.    (Progressing)       Start:  04/04/25    Expected End:  05/30/25            3 Patient will demonstrate use of self awareness,  goal setting, planning,  initiation, and  self-monitoring during daily living activities to improve safety and awareness in functional living environment   (Progressing)       Start:  04/04/25    Expected End:  05/30/25            4 Patient will develop functional cognitive-linguistic based skills/utilize compensatory strategies to communicate wants/needs effectively to different conversational partners, maintain safety, participate socially in functional living environment.     (Progressing)       Start:  04/04/25    Expected End:  05/30/25               Short Term Goals       1 Patient will complete tasks requiring alternating attention with 90% accuracy given min cues to improve attention skills. (Progressing)       Start:  04/04/25    Expected End:  05/02/25            2 Patient will immediately recall specific details from information recently seen or heard  using memory retrieval strategies with 90% accuracy given min cues to improve auditory and visual recall.    (Progressing)       Start:  04/04/25    Expected End:  05/02/25            3 Patient will complete 3-4 unit mental manipulation tasks with 90% accuracy given min cues to improve working memory/mental flexibility.    (Progressing)       Start:  04/04/25    Expected End:  05/02/25            4 Patient will complete simple - moderately complex planning/organization/reasoning tasks with 90% accuracy given min cues to improve executive functioning skills.  (Progressing)       Start:  04/04/25    Expected End:  05/02/25            5 Patient will complete word finding tasks with semantic relationships (I.e. Similarities and differences, synonyms, antonyms, semantic category) with 90% acc independently to improve word finding.  (Progressing)       Start:  04/04/25    Expected End:  05/02/25                GLEN Stubbs, L-SLP, CCC-SLP  Speech Language Pathologist   5/7/2025

## 2025-05-07 ENCOUNTER — CLINICAL SUPPORT (OUTPATIENT)
Dept: REHABILITATION | Facility: HOSPITAL | Age: 55
End: 2025-05-07
Payer: COMMERCIAL

## 2025-05-07 DIAGNOSIS — R41.841 COGNITIVE COMMUNICATION DEFICIT: Primary | ICD-10-CM

## 2025-05-07 DIAGNOSIS — M54.2 NECK PAIN: Primary | ICD-10-CM

## 2025-05-07 PROCEDURE — 97110 THERAPEUTIC EXERCISES: CPT | Mod: PO

## 2025-05-07 PROCEDURE — 97129 THER IVNTJ 1ST 15 MIN: CPT | Mod: PO | Performed by: SPEECH-LANGUAGE PATHOLOGIST

## 2025-05-07 PROCEDURE — 97140 MANUAL THERAPY 1/> REGIONS: CPT | Mod: PO

## 2025-05-07 PROCEDURE — 97112 NEUROMUSCULAR REEDUCATION: CPT | Mod: PO

## 2025-05-07 PROCEDURE — 97130 THER IVNTJ EA ADDL 15 MIN: CPT | Mod: PO | Performed by: SPEECH-LANGUAGE PATHOLOGIST

## 2025-05-07 NOTE — PROGRESS NOTES
Outpatient Rehab    Physical Therapy Progress Note    Patient Name: Lizabeth Gomez  MRN: [id number]  YOB: 1970  Encounter Date: 5/7/2025    Therapy Diagnosis:   Encounter Diagnosis   Name Primary?    Neck pain Yes     Physician: Krzysztof Mayorga MD    Physician Orders: Eval and Treat  Medical Diagnosis: Whiplash injury to neck, subsequent encounter  Cervicalgia of [id number] region  Cervicogenic headache    Visit # / Visits Authorized:  6 / 16  Insurance Authorization Period: 3/20/2025 to 3/20/2026  Date of Evaluation: 3/28/2025  Plan of Care Certification: 3/28/2025 to 5/28/2025      PT/PTA: PT   Number of PTA visits since last PT visit:0  Time In: 1630   Time Out: 1725  Total Time (in minutes): 55   Total Billable Time (in minutes): 55    FOTO:  Intake Score:  %  Survey Score 2:  %  Survey Score 3:  %         Subjective   Pt continues to c/o L side neck pain..         Objective            Treatment:  Therapeutic Exercise  TE 1: Seated Cx spine rotation 5 x 5 seconds to the R & L  TE 2: Seated Cx spine side bending 5 x 5 seconds to the R & L  TE 3: Seated trunk rotation 10 x 5 seconds to the R & L  TE 4: Table slides into flexion and extension 10 x 3  Manual Therapy  MT 1: Soft tissue mobilzation to the Cx spine paraspinals, upper trap and L thoracic paraspinals  MT 2: Manual Cx spine traction  MT 3: Seated L thoracic spine adn rib cage passive mobilization  MT 4: Passisve mobilizatoin L GH joint  Balance/Neuromuscular Re-Education  NMR 6: Scapular retractions 10 x 2  NMR 7: Shoudler shrugs 10 x 2  NMR 8: Seated rows 10 x 2 with gree TB - Decrease resistance next visit    Time Entry(in minutes):       Assessment & Plan   Assessment: Pt continues to c/o L side neck pain.  She was able to tolerate the Tx activities with reported L upper quarter discomfort.       Patient will continue to benefit from skilled outpatient physical therapy to address the deficits listed in the problem list  box on initial evaluation, provide pt/family education and to maximize pt's level of independence in the home and community environment.     Patient's spiritual, cultural, and educational needs considered and patient agreeable to plan of care and goals.           Plan: Continue to progress per PT POC    Goals:   Active       Long term goals       Pt will be independent in a HEP to assist in managing their neck and L shoulder Sx.   (Progressing)       Start:  03/28/25    Expected End:  05/28/25            Improve Cx spine ROM by 20 degrees (Progressing)       Start:  03/28/25    Expected End:  05/28/25            Improve L shoulder ROM to 145 degrees of flexion, 110 degrees of abduction and 45 degrees of IR and ER at 90 degrees of abduction (Progressing)       Start:  03/28/25    Expected End:  05/28/25            Pt will report improved function through an improved score on the FOTO Neck survey (Progressing)       Start:  03/28/25    Expected End:  05/28/25            Pt will return to work full time/full duty (Progressing)       Start:  03/28/25    Expected End:  05/28/25               Short term goals       Pt will be instructed in an exercise program to address functional deficits related to  neck Sx (Progressing)       Start:  03/28/25    Expected End:  04/28/25            Improve Cx spine ROM by 10 degrees (Progressing)       Start:  03/28/25    Expected End:  04/28/25            Improve L shoulder ROM to 110 degrees of flexion, 90 degrees of abduction and 25 degrees of IR and ER at 90 degrees of abduction (Progressing)       Start:  03/28/25    Expected End:  04/28/25            Pt will be compliant with her Physical Therapy appointments (Progressing)       Start:  03/28/25    Expected End:  04/28/25                José Manuel Ortega, PT

## 2025-05-13 ENCOUNTER — CLINICAL SUPPORT (OUTPATIENT)
Dept: REHABILITATION | Facility: HOSPITAL | Age: 55
End: 2025-05-13
Payer: COMMERCIAL

## 2025-05-13 DIAGNOSIS — S06.0X9D CONCUSSION WITH LOSS OF CONSCIOUSNESS, SUBSEQUENT ENCOUNTER: Primary | ICD-10-CM

## 2025-05-13 PROCEDURE — 97112 NEUROMUSCULAR REEDUCATION: CPT | Mod: PO

## 2025-05-13 PROCEDURE — 97530 THERAPEUTIC ACTIVITIES: CPT | Mod: PO

## 2025-05-13 NOTE — PROGRESS NOTES
Outpatient Rehab    Physical Therapy Progress Note : Updated Plan of Care    Patient Name: Lizabeth Gomez  MRN: [id number]  YOB: 1970  Encounter Date: 5/13/2025    Therapy Diagnosis:   Encounter Diagnosis   Name Primary?    Concussion with loss of consciousness, subsequent encounter Yes     Physician: Krzysztof Mayorga MD    Physician Orders: Eval and Treat  Medical Diagnosis: Concussion with loss of consciousness, sequela  Convergence insufficiency    Visit # / Visits Authorized:  9 / 12  Insurance Authorization Period: 1/6/2025 to 1/6/2026  Date of Evaluation: 02/03/25  Plan of Care Certification: 05/13/25 to 06/13/25     PT/PTA: PT   Number of PTA visits since last PT visit:0  Time In: 1622   Time Out: 1700  Total Time (in minutes): 38   Total Billable Time (in minutes): 38    FOTO:  Intake Score:  %  Survey Score 2:  %  Survey Score 3:  %    Precautions:     Standard      Subjective    She feels like she has improved with therapy. She is not having the headaches as much. She feels like she is sharper with her responses since working with speech therapy. Nothing significant has stood out in regards to her balance as noticing loss of balance episodes in daily routine. Does notice that occasionally will still need to use her sun glasses to reduce bright light stimulation, but this has also improved.          Objective      Visual/Oculomotor Screen:   Ocular range of motion: WFL  Spontaneous nystagmus (fixation present): none present  Gaze-evoked nystagmus (fixation present): none present  Tracking/Smooth Pursuits:Impaired: no visual slippage with tracking pen throughout, but patient endorses effortful motion to complete activity  Saccades: Impaired: no visual slippage but decreased endurance, and effortful motion  Convergence: impaired, 24 cm  VOR cancellation: NT    Acuity:wears glasses if working on computer all day  Visual field: Intact  VCR: NT    VOR 1: Impaired: horizontal at 70 bpm  Head  Thrust Test: NT  DVA: NT    MCTSIB:  1. Eyes Open/feet together/Firm: 30 seconds  2. Eyes Closed/feet together/Firm: 30 seconds, min sway  3. Eyes Open/feet together/Foam: 30 seconds, occ slight sway  4. Eyes Closed/feet together/Foam: 30 seconds, min sway  Total: 120/120  Evaluation : 102/120    Functional Gait Assessment:   1. Gait on level surface =  3              (3) Normal: less than 5.5 sec, no A.D., no imbalance, normal gait pattern, deviates< 6in              (2) Mild impairment: 7-5.6 sec, uses A.D., mild gait deviations, or deviates 6-10 in              (1) Moderate impairment: > 7 sec, slow speed, imbalance, deviates 10-15 in.              (0) Severe impairment: needs assist, deviates >15 in, reach/touch wall  2. Change in Gait Speed = 3              (3) Normal: smooth change w/o loss of balance or gait deviation, deviates < 6 in, significant difference between speeds              (2) Mild impairment: changes speed, but demonstrates mild gait deviations, deviates 6-10 in, OR no deviations but unable to significantly speed, OR uses A.D.              (1) Moderate impairment: minor changes to speed, OR changes speed w/ significant deviations, deviates 10-15 in, OR  Changes speed , but loses balance & recovers              (0) Severe impairment: cannot change speed, deviates >15 in, or loses balance & needs assist  3. Gait with horizontal head turns  = 3, dizziness              (3) Normal: no change in gait, deviates <6 in              (2) Mild impairment: slight change in speed, deviates 6-10 in, OR uses A.D.              (1) Moderate impairment: moderate change in speed, deviates 10-15 in              (0) Severe impairment: severe disruption of gait, deviates >15in  4. Gait with vertical head turns = 3              (3) Normal: no change in gait, deviates <6 in              (2) Mild impairment: slight change in speed, deviates 6-10 in OR uses A.D.              (1) Moderate impairment: moderate change in  speed, deviates 10-15 in              (0) Severe impairment: severe disruption of gait, deviates >15 in  5. Gait with pivot turns = 2              (3) Normal: performs safely in 3 sec, no LOB              (2) Mild impairment: performs in >3 sec & no LOB, OR turns safely & requires several steps to regain LOB              (1) Moderate impairment: turns slow, OR requires several small steps for balance following turn & stop              (0) Severe impairment: cannot turn safely, needs assist  6. Step over obstacle = 3              (3) Normal: steps over 2 stacked boxes w/o change in speed or LOB              (2) Mild impairment: able to step over 1 box w/o change in speed or LOB              (1) Moderate impairment: steps over 1 box but must slow down, may require VC              (0) Severe impairment: cannot perform w/o assist  7. Gait with Narrow MELLISSA = 2              (3) Normal: 10 steps no staggering              (2) Mild impairment: 7-9 steps              (1) Moderate impairment: 4-7 steps              (0) Severe impairment: < 4 steps or cannot perform w/o assist  8. Gait with eyes closed = 2, dizziness              (3) Normal: < 7 sec, no A.D., no LOB, normal gait pattern, deviates <6 in              (2) Mild impairment: 7.1-9 sec, mild gait deviations, deviates 6-10 in              (1) Moderate impairment: > 9 sec, abnormal pattern, LOB, deviates 10-15 in              (0) Severe impairment: cannot perform w/o assist, LOB, deviates >15in  9. Ambulating Backwards = 2              (3) Normal: no A.D., no LOB, normal gait pattern, deviates <6in              (2) Mild impairment: uses A.D., slower speed, mild gait deviations, deviates 6-10 in              (1) Moderate impairment: slow speed, abnormal gait pattern, LOB, deviates 10-15 in              (0) Severe impairment: severe gait deviations or LOB, deviates >15in  10. Steps = 2              (3) Normal: alternating feet, no rail              (2) Mild Impairment:  alternating feet, uses rail              (1) Moderate impairment: step-to, uses rail              (0) Severe impairment: cannot perform safely     Score: 25/30   03/10/25: 18/30      Score:   <22/30 fall risk   <20/30 fall risk in older adults   <18/30 fall risk in Parkinsons             Treatment:     Patient participated in neuromuscular re-education activities to improve: Balance, Coordination, Sense, and vestibular system function for 28 minutes. The following activities were included:   Time above includes time spent on objective measures. See above.       Patient participated in dynamic functional therapeutic activities to improve functional performance for 10 minutes. Including:   Time above includes time spent on objective measures and plan of care (POC) discussion.     Time Entry(in minutes):   See above    Assessment & Plan   Plan  From a physical therapy perspective, the patient would benefit from: Skilled Rehab Services    Planned therapy interventions include: Therapeutic exercise, Therapeutic activities, Neuromuscular re-education, ADLs/IADLs, and Canalith repositioning.            Visit Frequency: 1 times Per Week for 4 Weeks.  Other/tapered frequency details: To complete 3 remaining authorized visits    This plan was discussed with Patient.   Discussion participants: Agreed Upon Plan of Care  Plan details: Focus on oculomotor exercises: smooth pursuits, saccades, VOR 1, convergence; vestibular biased balance; motion tolerance activities      Patient reassessed for plan of care (POC) update. Reassessment period: 03/10/25 to 05/13/25. Patient endorses improvement in overall concussion symptoms over past plan of care (POC) with most improvements likely attributed to improved headaches and improved mental sharpness. Greatest improvements with vestibular plan of care (POC) most noted with balance activities. During MCTSIB, patient able to pass all conditions, but increased sway still noted on vision  eliminated conditions. Functional Gait Assessment score improved 7 points with current score placing patient out of elevated fall risk category; mild motion intolerance still noted with vestibular biased conditions like ambulation with head movements and vision eliminated ambulation. During oculomotor testing, patient demonstrates good quality of movement, but poor endurance and activity tolerance. Quick fatigue noted with smooth pursuits, saccades, and VOR 1 with patient endorsing activities as feeling effortful. Convergence point remains elevated above norm. PT discussed patient's interests in plan of care (POC) moving forward. Patient agreeable to complete 3 remaining authorized visits and then complete another reassessment to determine further needs at that point. PT to extend plan of care (POC) x 4 weeks.     Patient will continue to benefit from skilled outpatient physical therapy to address the deficits listed in the problem list box on initial evaluation, provide pt/family education and to maximize pt's level of independence in the home and community environment.     Patient's spiritual, cultural, and educational needs considered and patient agreeable to plan of care and goals.           Goals:   Active       Long term goals        Patient to improve FOTO by atleast 5 % for improved functional mobility  (Ongoing)       Start:  02/04/25    Expected End:  04/01/25            Patient to improve FGA to atleast 26/30 for improved dynamic balance and no fall risk (Progressing)       Start:  02/04/25    Expected End:  04/01/25            Patient to improve convergence to atleast 12 cm for improved reading (Ongoing)       Start:  02/04/25    Expected End:  04/01/25       3/10/2025 - 29 cm         Patient able to perform at least 30 min of moderate aerobic activity with less than or equal to 1 point increase in symptoms for improved endurance (Ongoing)       Start:  02/04/25    Expected End:  03/04/25                Short term goals       Patient to perform HEP Independent  (Progressing)       Start:  02/04/25    Expected End:  03/04/25       3/10/2025 - Reports partial compliance. Reports consistence with walks with head turns, VOR to lesser degree         Patient able to perform VOR x 1 at 90 bpm with less than or equal to 1 point increase in symptoms for improved vestibular function  (Ongoing)       Start:  02/04/25    Expected End:  03/04/25       3/10/2025 - unable to keep up with 90 bpm         Patient to score greater than or equal to 112/120 on mCTSIB for improved static balance (Met)       Start:  02/04/25    Expected End:  03/04/25    Resolved:  05/13/25             Nya Mistry, PT

## 2025-05-20 ENCOUNTER — CLINICAL SUPPORT (OUTPATIENT)
Dept: REHABILITATION | Facility: HOSPITAL | Age: 55
End: 2025-05-20
Payer: COMMERCIAL

## 2025-05-20 DIAGNOSIS — R41.841 COGNITIVE COMMUNICATION DEFICIT: Primary | ICD-10-CM

## 2025-05-20 PROCEDURE — 97130 THER IVNTJ EA ADDL 15 MIN: CPT | Mod: PO

## 2025-05-20 PROCEDURE — 97129 THER IVNTJ 1ST 15 MIN: CPT | Mod: PO

## 2025-05-20 NOTE — PROGRESS NOTES
Outpatient Rehab  Speech-Language Pathology Visit    Patient Name: Lizabeth Gomez  MRN: [id number]  YOB: 1970  Encounter Date: 5/20/2025    Therapy Diagnosis:   Encounter Diagnosis   Name Primary?    Cognitive communication deficit Yes       Physician: Krzysztof Mayorga MD    Physician Orders: Eval and Treat  Medical Diagnosis: Concussion with loss of consciousness, sequela  Cognitive change    Visit # / Visits Authorized: 0 / 12  3 plus evaluation    Insurance Authorization Period: 1/6/2025 to 1/6/2026  Date of Evaluation: 4/4/2025   Plan of Care Certification: 4/4/2025 to 5/30/2025      Time In: 1620   Time Out:    Total Time (in minutes):     Total Billable Time (in minutes):      FOTO:  Intake Score:  % 38.7%  Survey Score 2:  %  Survey Score 3:  %    Precautions:       Subjective   seeing improvements and close to baseline. She went to [employer] last week and did well working multiple cards..  Pain reported as 0/10.    Patient arrived to scheduled speech therapy session on time in pleasant spirits with no complaints or concerns stated. Patient appeared motivated to participate in skilled speech therapy services.       Objective            Treatment     1.  Patient will complete tasks requiring alternating attention with 90% accuracy given min cues to improve attention skills.   - Patient alternated between 2 tasks - deduction puzzle and recall spoken words or repeating the pattern - This more was challenging due to the her needing to remember the spoken word or pattern in the middle of the cycle when the timer buzzed after 2 minutes.  However when completing the tasks individually she did well.   METx3/discontinue     2. Patient will immediately recall specific details from information recently seen or heard using memory retrieval strategies with 90% accuracy given min cues to improve auditory and visual recall.   - Constant Therapy - recall spoken words L1 (1 word back)- 85% accuracy  independently        Met x 2     3. Patient will complete 3-4 unit mental manipulation tasks with 90% accuracy given min cues to improve working memory/mental flexibility.   - Constant Therapy remember a pattern (6 unit visual mental manipulation task) completed with 71 % accuracy independently    Met x 1     4. Patient will complete simple - moderately complex planning/organization/reasoning tasks with 90% accuracy given min cues to improve executive functioning skills.   - Deduction puzzle #2 - 100% accuracy   - Answered complex logic questions for reasoning - 50% accuracy       METx2     5. Patient will complete word finding tasks with semantic relationships (I.e. Similarities and differences, synonyms, antonyms, semantic category) with 90% acc independently to improve word finding.   - No apparent word finding difficulty noted at conversation level   Goal met    Time Entry(in minutes):       Assessment & Plan   Assessment      Patient is close to baseline and making improvements. Good performance on tasks today. Discharge next session.   To date, Lizabeth has met 2 goals. And meeting all  other goals.     Patient will continue to benefit from skilled outpatient speech therapy to address the deficits listed in the problem list box on initial evaluation, provide pt/family education and to maximize pt's level of independence in the home and community environment.     Patient's spiritual, cultural, and educational needs considered and patient agreeable to plan of care and goals.          Plan             Goals:   Active       Long Term Goals        1 Patient will improve  attention skills to effectively attend to and communicate in complex daily living tasks in functional living environment.    (Progressing)       Start:  04/04/25    Expected End:  05/30/25            2 Patient will use appropriate memory strategies to schedule and recall weekly activities, express needs and recall names to maintain safety and  participate socially in functional living environment.    (Progressing)       Start:  04/04/25    Expected End:  05/30/25            3 Patient will demonstrate use of self awareness,  goal setting, planning,  initiation, and  self-monitoring during daily living activities to improve safety and awareness in functional living environment   (Progressing)       Start:  04/04/25    Expected End:  05/30/25            4 Patient will develop functional cognitive-linguistic based skills/utilize compensatory strategies to communicate wants/needs effectively to different conversational partners, maintain safety, participate socially in functional living environment.     (Progressing)       Start:  04/04/25    Expected End:  05/30/25               Short Term Goals       1 Patient will complete tasks requiring alternating attention with 90% accuracy given min cues to improve attention skills. (Progressing)       Start:  04/04/25    Expected End:  05/02/25            2 Patient will immediately recall specific details from information recently seen or heard using memory retrieval strategies with 90% accuracy given min cues to improve auditory and visual recall.    (Progressing)       Start:  04/04/25    Expected End:  05/02/25            3 Patient will complete 3-4 unit mental manipulation tasks with 90% accuracy given min cues to improve working memory/mental flexibility.    (Progressing)       Start:  04/04/25    Expected End:  05/02/25            4 Patient will complete simple - moderately complex planning/organization/reasoning tasks with 90% accuracy given min cues to improve executive functioning skills.  (Progressing)       Start:  04/04/25    Expected End:  05/02/25            5 Patient will complete word finding tasks with semantic relationships (I.e. Similarities and differences, synonyms, antonyms, semantic category) with 90% acc independently to improve word finding.  (Progressing)       Start:  04/04/25    Expected  End:  05/02/25                FRANNIE Goodson., L-SLP,CCC-SLP, CBIS  Speech-Language Pathologist  Certified Brain Injury Specialist     5/20/2025

## 2025-05-22 ENCOUNTER — OFFICE VISIT (OUTPATIENT)
Facility: CLINIC | Age: 55
End: 2025-05-22
Payer: COMMERCIAL

## 2025-05-22 VITALS — HEART RATE: 73 BPM | DIASTOLIC BLOOD PRESSURE: 86 MMHG | SYSTOLIC BLOOD PRESSURE: 126 MMHG

## 2025-05-22 DIAGNOSIS — H51.11 CONVERGENCE INSUFFICIENCY: ICD-10-CM

## 2025-05-22 DIAGNOSIS — S13.4XXD WHIPLASH INJURY TO NECK, SUBSEQUENT ENCOUNTER: ICD-10-CM

## 2025-05-22 DIAGNOSIS — S06.0X9S CONCUSSION WITH LOSS OF CONSCIOUSNESS, SEQUELA: Primary | ICD-10-CM

## 2025-05-22 PROCEDURE — 99999 PR PBB SHADOW E&M-EST. PATIENT-LVL III: CPT | Mod: PBBFAC,,, | Performed by: PSYCHIATRY & NEUROLOGY
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
## 2025-05-22 NOTE — PATIENT INSTRUCTIONS
Finish up with lower neck/upper back to mid back PT with dry needling. No soft tissue manipulation of suboccipital region if you start to feel worse. All joint manipulation is fine.  May progressively increase physical activity as tolerated  Agree with following up with a shoulder specialist  Finish up with vestibular PT for eye movements  Finish up with ST for cognition placed previously  It is with a high degree of medical certainty that this patient's current signs and symptoms were caused or exacerbated by the work related injury mentioned in the note above  The patient can do full duty work without restrictions  Any delay in treatment that I am recommending for the patient's work related injury as the result of things like IMEs, FCEs, and denials in the care plan may delay the patient's recovery. Delays in recovery may cause or further worsen any underlying permanent injury that was caused by the result of the patient's injury. Delays in recovery may also cause the development of new medical conditions that will then need to be treated. The development of some forms of permanent injuries may result in nerve injuries that are refractory to initial treatment, which could result in further medical expenses as more expensive methods of treatment or prolonged treatment may be needed to treat the underlying injury. In my collective clinical experience, my care plan as documented from the initial note leads to significant recovery from injuries similar to the patient's in the majority of my other patients. Delays in recovery will prevent the patient from returning to work full time in a timely fashion. Delays in recovery caused by workers compensation for patients with similar injury have also lead to significant financial settlements for the patients should a legal dispute arise. I have counseled the patient on all of the above.  Please be advised that I do not determine MMI and will update in the assessment whether  patient is improving or not, or has plateaued to the point of permanent symptoms despite successfully following recommended treatment by myself  Please be advised that the above work status is re-evaluated each visit and that the nature of the patient's injury described in the assessment and diagnoses do not have a standard or expected timeline for recovery as demonstrated in multiple up to date peer-reviewed publications  Please be advised that I do not perform FCEs. I will also not answer or comment on any FCE questions.  Please be advised I will not fill out additional paperwork that asks me to restate diagnoses, plan of care, work status, and/or accommodations as these are all documented in my clinic note.  Please be advised I will not fill out paperwork asking me to agree with a workers compensation representative's interpretation of my plan of care and/or evaluation of the patient as I stand by what is documented in my clinic note.  Please be advised that any peer to peer requests made on the behalf of workers compensation will need to be scheduled based on my availability and will be completed by myself within 1 week of the request. My clinic is not capable of doing on demand peer to peer requests with less than 48 hours of notice.

## 2025-05-22 NOTE — LETTER
May 22, 2025      [facility] Neurology 7th Floor  [address]  [facility] [address]  Phone: [phone]  Fax: [phone]       Patient: Lizabeth Gomez   YOB: 1970  Date of Visit: 05/22/2025    To Whom It May Concern:    Salma Gomez  was at [facility] on 05/22/2025. The patient may return to work on 05/23/2025. If you have any questions or concerns, or if I can be of further assistance, please do not hesitate to contact me.    Sincerely,      Krzysztof Mayorga MD

## 2025-05-23 ENCOUNTER — CLINICAL SUPPORT (OUTPATIENT)
Dept: REHABILITATION | Facility: HOSPITAL | Age: 55
End: 2025-05-23
Payer: COMMERCIAL

## 2025-05-23 DIAGNOSIS — S06.0X9D CONCUSSION WITH LOSS OF CONSCIOUSNESS, SUBSEQUENT ENCOUNTER: Primary | ICD-10-CM

## 2025-05-23 PROCEDURE — 97112 NEUROMUSCULAR REEDUCATION: CPT | Mod: PO

## 2025-05-23 PROCEDURE — 97530 THERAPEUTIC ACTIVITIES: CPT | Mod: PO

## 2025-05-27 ENCOUNTER — CLINICAL SUPPORT (OUTPATIENT)
Dept: REHABILITATION | Facility: HOSPITAL | Age: 55
End: 2025-05-27
Payer: COMMERCIAL

## 2025-05-27 DIAGNOSIS — S06.0X9D CONCUSSION WITH LOSS OF CONSCIOUSNESS, SUBSEQUENT ENCOUNTER: Primary | ICD-10-CM

## 2025-05-27 PROCEDURE — 97112 NEUROMUSCULAR REEDUCATION: CPT | Mod: PO

## 2025-05-27 PROCEDURE — 97530 THERAPEUTIC ACTIVITIES: CPT | Mod: PO

## 2025-05-27 NOTE — PROGRESS NOTES
Outpatient Rehab    Physical Therapy Visit- Discharge Summary    Patient Name: Lizabeth Gomez  MRN: [id number]  YOB: 1970  Encounter Date: 5/27/2025    Therapy Diagnosis:   Encounter Diagnosis   Name Primary?    Concussion with loss of consciousness, subsequent encounter Yes     Physician: Krzysztof Mayorga MD    Physician Orders: Eval and Treat  Medical Diagnosis: Concussion with loss of consciousness, sequela  Convergence insufficiency    Visit # / Visits Authorized:  11 / 12  Insurance Authorization Period: 1/6/2025 to 1/6/2026  Date of Evaluation: 2/3/2025  Plan of Care Certification: 5/13/2025 to 6/13/2025      PT/PTA: PT   Number of PTA visits since last PT visit:0  Time In: 1702   Time Out: 1730  Total Time (in minutes): 28   Total Billable Time (in minutes): 28    FOTO:  Intake Score:  %  Survey Score 2:  %  Survey Score 3:  %    Precautions:     Standard      Subjective   that she is ready to discharge!. She has been feeling a lot better recently and has not had any recent issues with dizziness or imbalance. She has noticed a big improvement over the past few weeks as her symptoms have improved.   Pain reported as 0/10.      Objective   Visual/Oculomotor Screen:   Ocular range of motion: WFL  Spontaneous nystagmus (fixation present): none present  Gaze-evoked nystagmus (fixation present): none present  Tracking/Smooth Pursuits:WFL: no visual slippage with tracking pen throughout, no symptoms  Saccades: WFL: no visual slippage, no symptoms  Convergence: impaired, 18 cm (24 cm at prior assessment)-- improved  VOR cancellation: NT     Acuity:wears glasses if working on computer all day  Visual field: Intact  VCR: NT     VOR 1: WFL horizontal at 90 bpm  Head Thrust Test: NT  DVA: NT     MCTSIB:  1. Eyes Open/feet together/Firm: 30 seconds  2. Eyes Closed/feet together/Firm: 30 seconds  3. Eyes Open/feet together/Foam: 30 seconds  4. Eyes Closed/feet together/Foam: 30 seconds  Total:  120/120  05/13/25: 120/120 c/ sway on conditions 2, 3,4  Evaluation: 102/120     Functional Gait Assessment:   1. Gait on level surface =  3              (3) Normal: less than 5.5 sec, no A.D., no imbalance, normal gait pattern, deviates< 6in              (2) Mild impairment: 7-5.6 sec, uses A.D., mild gait deviations, or deviates 6-10 in              (1) Moderate impairment: > 7 sec, slow speed, imbalance, deviates 10-15 in.              (0) Severe impairment: needs assist, deviates >15 in, reach/touch wall  2. Change in Gait Speed = 3              (3) Normal: smooth change w/o loss of balance or gait deviation, deviates < 6 in, significant difference between speeds              (2) Mild impairment: changes speed, but demonstrates mild gait deviations, deviates 6-10 in, OR no deviations but unable to significantly speed, OR uses A.D.              (1) Moderate impairment: minor changes to speed, OR changes speed w/ significant deviations, deviates 10-15 in, OR  Changes speed , but loses balance & recovers              (0) Severe impairment: cannot change speed, deviates >15 in, or loses balance & needs assist  3. Gait with horizontal head turns  = 3              (3) Normal: no change in gait, deviates <6 in              (2) Mild impairment: slight change in speed, deviates 6-10 in, OR uses A.D.              (1) Moderate impairment: moderate change in speed, deviates 10-15 in              (0) Severe impairment: severe disruption of gait, deviates >15in  4. Gait with vertical head turns = 3              (3) Normal: no change in gait, deviates <6 in              (2) Mild impairment: slight change in speed, deviates 6-10 in OR uses A.D.              (1) Moderate impairment: moderate change in speed, deviates 10-15 in              (0) Severe impairment: severe disruption of gait, deviates >15 in  5. Gait with pivot turns = 3              (3) Normal: performs safely in 3 sec, no LOB              (2) Mild impairment:  performs in >3 sec & no LOB, OR turns safely & requires several steps to regain LOB              (1) Moderate impairment: turns slow, OR requires several small steps for balance following turn & stop              (0) Severe impairment: cannot turn safely, needs assist  6. Step over obstacle = 3              (3) Normal: steps over 2 stacked boxes w/o change in speed or LOB              (2) Mild impairment: able to step over 1 box w/o change in speed or LOB              (1) Moderate impairment: steps over 1 box but must slow down, may require VC              (0) Severe impairment: cannot perform w/o assist  7. Gait with Narrow MELLISSA = 3              (3) Normal: 10 steps no staggering              (2) Mild impairment: 7-9 steps              (1) Moderate impairment: 4-7 steps              (0) Severe impairment: < 4 steps or cannot perform w/o assist  8. Gait with eyes closed = 3              (3) Normal: < 7 sec, no A.D., no LOB, normal gait pattern, deviates <6 in              (2) Mild impairment: 7.1-9 sec, mild gait deviations, deviates 6-10 in              (1) Moderate impairment: > 9 sec, abnormal pattern, LOB, deviates 10-15 in              (0) Severe impairment: cannot perform w/o assist, LOB, deviates >15in  9. Ambulating Backwards = 3              (3) Normal: no A.D., no LOB, normal gait pattern, deviates <6in              (2) Mild impairment: uses A.D., slower speed, mild gait deviations, deviates 6-10 in              (1) Moderate impairment: slow speed, abnormal gait pattern, LOB, deviates 10-15 in              (0) Severe impairment: severe gait deviations or LOB, deviates >15in  10. Steps = 3              (3) Normal: alternating feet, no rail              (2) Mild Impairment: alternating feet, uses rail              (1) Moderate impairment: step-to, uses rail              (0) Severe impairment: cannot perform safely     Score: 30/30 05/13/25: 25/30   03/10/25: 18/30      Score:   <22/30 fall risk   <20/30 fall  risk in older adults   <18/30 fall risk in Parkinsons          Treatment:    Lizabeth received therapeutic exercises to develop strength, endurance, ROM, flexibility, posture, and core stabilization for 00 minutes including:     Patient participated in neuromuscular re-education activities to improve: Balance, Coordination, Sense, and vestibular system function for 20 minutes. The following activities were included:   Time above includes time spent on objective testing.         Patient participated in dynamic functional therapeutic activities to improve functional performance for 08 minutes. Including:   Time above includes time spent on objective testing.     Time Entry(in minutes):   See above    Assessment & Plan   PHYSICAL THERAPY DISCHARGE SUMMARY   Total Visits:11    Status Towards Goals Met: Lizabeth demonstrates excellent progress with vestibular PT with subjective reports of significant improvement in dizziness and imbalance in daily routine. Improvements also noted with oculomotor testing, motion tolerance, and overall balance. During oculomotor testing, smooth pursuits, saccades, and VOR 1 WFL at current intensities with no symptoms elicited. Convergence point remains elevated above norm, but significantly improved. Home exercise program provided for patient to maintain gains with VOR 1 and to continue to work on convergence deficits. Patient able to pass all conditions of MCTSIB with no additional sway throughout today. Greatest improvement noted on vision eliminated conditions. Patient also scored 30/30 on Functional Gait Assessment today, with significant improvement throughout course of therapy episode. Current objective measures place patient out of fall risk category. Patient endorses readiness for discharge and current performance in vestibular therapy along with resolution of vestibular symptoms supports appropriateness of d/c at this time.        Discharge reason : Met goals    PLAN   This patient  is discharged from Outpatient Physical Therapy Services.     Nya Mistry, PT  05/27/2025     Goals:   Active       Long term goals        Patient to improve FOTO by atleast 5 % for improved functional mobility  (Met)       Start:  02/04/25    Expected End:  04/01/25    Resolved:  05/27/25         Patient to improve FGA to atleast 26/30 for improved dynamic balance and no fall risk (Met)       Start:  02/04/25    Expected End:  04/01/25    Resolved:  05/27/25         Patient to improve convergence to atleast 12 cm for improved reading (Unable to Meet)       Start:  02/04/25    Expected End:  04/01/25       3/10/2025 - 29 cm         Patient able to perform at least 30 min of moderate aerobic activity with less than or equal to 1 point increase in symptoms for improved endurance (Met)       Start:  02/04/25    Expected End:  03/04/25    Resolved:  05/27/25           Resolved       Short term goals       Patient to perform HEP Independent  (Met)       Start:  02/04/25    Expected End:  03/04/25    Resolved:  05/27/25    3/10/2025 - Reports partial compliance. Reports consistence with walks with head turns, VOR to lesser degree         Patient able to perform VOR x 1 at 90 bpm with less than or equal to 1 point increase in symptoms for improved vestibular function  (Met)       Start:  02/04/25    Expected End:  03/04/25    Resolved:  05/27/25    3/10/2025 - unable to keep up with 90 bpm         Patient to score greater than or equal to 112/120 on mCTSIB for improved static balance (Met)       Start:  02/04/25    Expected End:  03/04/25    Resolved:  05/13/25             Nay Mistry, PT

## 2025-06-06 ENCOUNTER — DOCUMENTATION ONLY (OUTPATIENT)
Dept: REHABILITATION | Facility: HOSPITAL | Age: 55
End: 2025-06-06
Payer: COMMERCIAL

## 2025-06-06 DIAGNOSIS — R41.841 COGNITIVE COMMUNICATION DEFICIT: Primary | ICD-10-CM

## 2025-06-09 ENCOUNTER — PATIENT MESSAGE (OUTPATIENT)
Dept: SPORTS MEDICINE | Facility: CLINIC | Age: 55
End: 2025-06-09
Payer: COMMERCIAL

## 2025-06-13 ENCOUNTER — OFFICE VISIT (OUTPATIENT)
Dept: SPORTS MEDICINE | Facility: CLINIC | Age: 55
End: 2025-06-13
Payer: COMMERCIAL

## 2025-06-13 VITALS
HEART RATE: 76 BPM | HEIGHT: 63 IN | DIASTOLIC BLOOD PRESSURE: 95 MMHG | WEIGHT: 217.81 LBS | BODY MASS INDEX: 38.59 KG/M2 | SYSTOLIC BLOOD PRESSURE: 148 MMHG

## 2025-06-13 DIAGNOSIS — Z02.6 ENCOUNTER RELATED TO WORKER'S COMPENSATION CLAIM: ICD-10-CM

## 2025-06-13 DIAGNOSIS — M67.912 ROTATOR CUFF DISORDER, LEFT: ICD-10-CM

## 2025-06-13 DIAGNOSIS — M75.112 INCOMPLETE TEAR OF LEFT ROTATOR CUFF, UNSPECIFIED WHETHER TRAUMATIC: Primary | ICD-10-CM

## 2025-06-13 DIAGNOSIS — S43.432D TEAR OF LEFT GLENOID LABRUM, SUBSEQUENT ENCOUNTER: ICD-10-CM

## 2025-06-13 DIAGNOSIS — M75.22 BICEPS TENDINITIS OF LEFT UPPER EXTREMITY: ICD-10-CM
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
## 2025-06-13 PROCEDURE — 99999 PR PBB SHADOW E&M-EST. PATIENT-LVL III: CPT | Mod: PBBFAC,,, | Performed by: ORTHOPAEDIC SURGERY

## 2025-06-13 NOTE — PROGRESS NOTES
CC: LEFT shoulder pain    25: Patient is here for FU left shoulder pain. She has been doing PT [facility] [address] and feels she is making good progress. She is happy with her progress so far and is not interested in surgery. She has on average 3/10 pain in the mornings. She has been able to modify her activities so that her shoulder does not bother her much during the day. She wishes to continue with physical therapy.        25:Patient is here for follow up. She has discontinued PT as she was doing PT for tinnitus. Patient reports improvement with PT and is progressing. She is currently being treated for concussion by neurology.      55 y.o. Female with a  history of left shoulder traumatic pain while at work in 2024. Patient reports no pain before this injury. She reports a fall while going up stairs to the left shoulder and cervical spine.  Patient works for [employer]. Patient reports she has not been seen for cervical spine.   She has attended PT with minimal relief. PT was discontinued due to authorization. Of note patient is still recovering from concussion symptoms.     She reports that the pain and weakness is worse with overhead activity. It also bothers her at night.    Is affecting ADLs.  Pain is 8/10 at it's worst.      Past Medical History:   Diagnosis Date    Anxiety     Arthrosis of left acromioclavicular joint 2025    Depression     Eczema     Hypertension        Past Surgical History:   Procedure Laterality Date     SECTION          TONSILLECTOMY  1992       Family History   Problem Relation Name Age of Onset    Hypertension Mother      Kidney failure Mother      Heart disease Father      Pacemaker/defibrilator Father      Multiple sclerosis Father      No Known Problems Brother      No Known Problems Brother      No Known Problems Daughter           Current Outpatient Medications:     hydroCHLOROthiazide (HYDRODIURIL) 25 MG tablet, Take 25 mg by  "mouth., Disp: , Rfl:     meloxicam (MOBIC) 15 MG tablet, Take 1 tablet (15 mg total) by mouth once daily., Disp: 30 tablet, Rfl: 0    mupirocin (BACTROBAN) 2 % ointment, Apply to affected area 3 times daily, Disp: 22 g, Rfl: 1    tiZANidine (ZANAFLEX) 4 MG tablet, Take 1 tablet (4 mg total) by mouth nightly as needed (muscle spasm)., Disp: 20 tablet, Rfl: 0    amLODIPine (NORVASC) 10 MG tablet, Take 5 mg by mouth., Disp: , Rfl:     lisinopriL 10 MG tablet, Take 4 tablets by mouth once daily., Disp: , Rfl:     topiramate (TOPAMAX) 25 MG tablet, Take 1 tablet (25 mg total) by mouth 2 (two) times daily. (Patient not taking: Reported on 1/6/2025), Disp: 60 tablet, Rfl: 3    Review of patient's allergies indicates:  No Known Allergies       REVIEW OF SYSTEMS:  Constitution: Negative. Negative for chills, fever and night sweats.   HENT: Negative for congestion and headaches.    Eyes: Negative for blurred vision, left vision loss and right vision loss.   Cardiovascular: Negative for chest pain and syncope.   Respiratory: Negative for cough and shortness of breath.    Endocrine: Negative for polydipsia, polyphagia and polyuria.   Hematologic/Lymphatic: Negative for bleeding problem. Does not bruise/bleed easily.   Skin: Negative for dry skin, itching and rash.   Musculoskeletal: Negative for falls.  Positive for left shoulder pain and muscle weakness.   Gastrointestinal: Negative for abdominal pain and bowel incontinence.   Genitourinary: Negative for bladder incontinence and nocturia.   Neurological: Negative for disturbances in coordination, loss of balance and seizures.   Psychiatric/Behavioral: Negative for depression. The patient does not have insomnia.    Allergic/Immunologic: Negative for hives and persistent infections.      PHYSICAL EXAMINATION:  Vitals:  BP (!) 148/95   Pulse 76   Ht 5' 3" (1.6 m)   Wt 98.8 kg (217 lb 13 oz)   BMI 38.58 kg/m²    General: The patient is alert and oriented x 3.  Mood is " pleasant.  Observation of ears, eyes and nose reveal no gross abnormalities.  No labored breathing observed.  Gait is coordinated. Patient can toe walk and heel walk without difficulty.      LEFT Shoulder / Upper Extremity Exam    OBSERVATION:     Swelling  none  Deformity  none   Discoloration  none   Scapular winging none   Scars   none  Atrophy  none    TENDERNESS / CREPITUS (T/C):          T/C      T/C   Clavicle   -/-  SUPRAspinatus    -/-     AC Jt.    -/-  INFRAspinatus  -/-    SC Jt.    -/-  Deltoid    -/-      G. Tuberosity  -/-  LH BICEP groove  +/-   Acromion:  -/-  Midline Neck   -/-     Scapular Spine -/-  Trapezium   -/-   SMA Scapula  -/-  GH jt. line - post  -/-     Scapulothoracic  -/-         ROM: (* = with pain)  Right shoulder   Left shoulder        AROM (PROM)   AROM (PROM)   FE    170° (175°)     160° (170°*)     ER at 0°    60°  (65°)    60°  (60°)    IR (spine level)   T10     L2 *    STRENGTH: (* = with pain) Right shoulder   Left shoulder    SCAPTION   5/5    5/5    IR    5/5    5/5   ER    5/5    5/5   BICEPS   5/5    5/5   Deltoid    5/5    5/5     SIGNS:  Painful side       NEER   +   OCARLOSS  +    XAVIER   +    SPEEDS  +     DROP ARM   -   BELLY PRESS neg   Superior escape none    LIFT-OFF  neg   X-Body ADD    neg    MOVING VALGUS neg        STABILITY TESTING    Right shoulder   Left shoulder    Translation     Anterior  up face     up face    Posterior  up face    up face    Sulcus   < 10mm    < 10 mm     Signs   Apprehension   neg      neg       Relocation   no change     no change      Jerk test  neg     neg    EXTREMITY NEURO-VASCULAR EXAM:    Sensation grossly intact to light touch all dermatomal regions.    DTR 2+ Biceps, Triceps, BR and Negative Tushars sign   Grossly intact motor function at Elbow, Wrist and Hand   Distal pulses radial and ulnar 2+, brisk cap refill, symmetric.      NECK:  Painless FROM and spinous processes non-tender. Negative Spurlings sign.       OTHER FINDINGS:      IMAGING:    Left shoulder MRI from 12/17/2024 -   IMPRESSION   1. Acromioclavicular osteoarthrosis with findings of subacromial impingement with subacromial subdeltoid bursitis.   2. Supraspinatus tendinosis with acute full-thickness full width tear mid fibers with tendon retraction.  Infraspinatus tendinosis with acute partial thickness partial width moderate grade articular surface tear anterior fibers.    3. Glenohumeral osteoarthrosis with joint effusion.   4. Biceps tenosynovitis.   5. Superior glenoid labral tear.   6. Linear stellate nondisplaced fracture/microfracture pattern of bone marrow edema and contusion, bone bruise in the humeral head and neck.       Cervical spine MRI -  IMPRESSION  1.  Straightening of the cervical spine with degenerative disc changes seen at C4-C5 and C5-C6.    2.  Mild cervical stenosis C4-C5 and C5-C6 with findings as discussed above.    3.  No significant foraminal restriction evident in the cervical region.       ASSESSMENT:   Left shoulder pain due to work related injury. Continues to improve with physical therapy. Not interested in surgery.   1. Incomplete tear of left rotator cuff, unspecified whether traumatic    2. Tear of left glenoid labrum, subsequent encounter    3. Rotator cuff disorder, left    4. Biceps tendinitis of left upper extremity    5. Work related injury    6. Encounter related to worker's compensation claim            Plan:  Continue PT for shoulder as she is seeing improvement. New referral placed.   2.  Refer back to occupational med for continued conservative management and work status restrictions  3.  Follow up as needed  /4. Patient cleared to return to work on 7/25/21 with no restrictions.

## 2025-06-26 ENCOUNTER — CLINICAL SUPPORT (OUTPATIENT)
Dept: REHABILITATION | Facility: HOSPITAL | Age: 55
End: 2025-06-26
Payer: COMMERCIAL

## 2025-06-26 DIAGNOSIS — M54.2 NECK PAIN: Primary | ICD-10-CM

## 2025-06-26 PROCEDURE — 97110 THERAPEUTIC EXERCISES: CPT | Mod: PO

## 2025-06-26 PROCEDURE — 97112 NEUROMUSCULAR REEDUCATION: CPT | Mod: PO

## 2025-06-26 PROCEDURE — 97530 THERAPEUTIC ACTIVITIES: CPT | Mod: PO

## 2025-06-26 PROCEDURE — 97140 MANUAL THERAPY 1/> REGIONS: CPT | Mod: PO

## 2025-06-26 NOTE — PROGRESS NOTES
Outpatient Rehab    Physical Therapy Progress Note    Patient Name: Lizabeth Gomez  MRN: [id number]  YOB: 1970  Encounter Date: 6/26/2025    Therapy Diagnosis:   Encounter Diagnosis   Name Primary?    Neck pain Yes     Physician: Krzysztof Mayorga MD    Physician Orders: Eval and Treat  Medical Diagnosis: Whiplash injury to neck, subsequent encounter  Cervicalgia of lbnazksa-kounmub-inoln region  Cervicogenic headache  Surgical Diagnosis: Not applicable for this Episode   Surgical Date: Not applicable for this Episode  Days Since Last Surgery: Not applicable for this Episode    Visit # / Visits Authorized:  7 / 16  Insurance Authorization Period: 3/20/2025 to 3/20/2026  Date of Evaluation: 3/20/2025  3/28/2025  Plan of Care Certification:       PT/PTA: PT   Number of PTA visits since last PT visit:0  Time In: 1405   Time Out: 1500  Total Time (in minutes): 55   Total Billable Time (in minutes): 55    FOTO:  Intake Score:  %  Survey Score 2:  %  Survey Score 3:  %    Precautions:       Subjective   Pt continues to c/o L side neck pain..  Pain reported as 2/10. L shoulder and neck    Objective      Subcranial Range of Motion   Active Restricted? Passive Restricted? Pain   Flexion         Protraction         Retraction           Cervical Range of Motion   Active (deg) Passive (deg) Pain   Flexion 35       Extension 35       Right Lateral Flexion 20       Right Rotation         Left Lateral Flexion 35       Left Rotation                Shoulder Range of Motion  Right Shoulder   Active (deg) Passive (deg) Pain   Flexion   170     Extension         Scaption         ABduction   155     ADduction         Horizontal ABduction         Horizontal ADduction         External Rotation (Shoulder ABducted 0 degrees)         External Rotation (Shoulder ABducted 45 degrees)         External Rotation (Shoulder ABducted 90 degrees)   100     Internal Rotation (Shoulder ABducted 0 degrees)         Internal Rotation  (Shoulder ABducted 45 degrees)         Internal Rotation (Shoulder ABducted 90 degrees)   15       Left Shoulder   Active (deg) Passive (deg) Pain   Flexion   110     Extension         Scaption         ABduction   90     ADduction         Horizontal ABduction         Horizontal ADduction         External Rotation (Shoulder ABducted 0 degrees)         External Rotation (Shoulder ABducted 45 degrees)         External Rotation (Shoulder ABducted 90 degrees)   20     Internal Rotation (Shoulder ABducted 0 degrees)         Internal Rotation (Shoulder ABducted 45 degrees)   10     Internal Rotation (Shoulder ABducted 90 degrees)   10                   Shoulder Strength - Planes of Motion   Right Strength Right Pain Left Strength Left  Pain   Flexion 5   2- Yes   Extension     2- Yes   ABduction 5         ADduction           Horizontal ABduction           Horizontal ADduction           Internal Rotation 0° 5   3 Yes   Internal Rotation 90°           External Rotation 0° 5   3 Yes   External Rotation 90°                            Treatment:  Therapeutic Exercise  TE 1: Seated Cx spine rotation 5 x 5 seconds to the R & L  TE 2: Seated Cx spine side bending 5 x 5 seconds to the R & L  TE 3: Seated trunk rotation 10 x 5 seconds to the R & L  TE 4: Table slides into flexion and extension 10 x 3  Manual Therapy  MT 1: Soft tissue mobilzation to the Cx spine paraspinals, upper trap and L thoracic paraspinals  MT 2: Manual Cx spine traction  MT 3: Seated L thoracic spine and rib cage passive mobilization  MT 4: Passisve mobilizatoin L GH joint  Balance/Neuromuscular Re-Education  NMR 6: Scapular retractions 10 x 2  NMR 7: Shoudler shrugs 10 x 2  NMR 8: Seated rows 10 x 2 with gree TB - Decrease resistance next visit    Time Entry(in minutes):  Manual Therapy Time Entry: 10  Neuromuscular Re-Education Time Entry: 15  Therapeutic Activity Time Entry: 15  Therapeutic Exercise Time Entry: 15    Assessment & Plan   Assessment: Pt  returns to Orthopedic Physical Therapy after a 7 week break while she concentrated on vestibular therapy.  She remains sore in the neck and L upper quarter with greater discomfort on the L side of her neck than the R.  Her Cx spine ROM has improve into flexion and extension and decreased in B lateral flexion as compared to her initial evaluation on March 28. 2025.  She was able to tolerate ROM exercises and manual interventions geared towards improving ROM and muscular support of her Cx spine with reported discomfort today.        The patient will continue to benefit from skilled outpatient physical therapy in order to address the deficits listed in the problem list on the initial evaluation, provide patient and family education, and maximize the patients level of independence in the home and community environments.     The patient's spiritual, cultural, and educational needs were considered, and the patient is agreeable to the plan of care and goals.           Plan: Progress Physical Therapy program to assist Pt with managing her Cx spine Sx and return to full duty at work.    Goals:   Outpatient Rehab - Rehab - Physical Therapy - March 2025 Problems       Outpatient Rehab - Rehab - Physical Therapy - March 2025 Problems (Active)       Long term goals       Pt will be independent in a HEP to assist in managing their Neck and shoulder Sx.         Start:  03/28/25    Expected End:  05/28/25            Improve Cx spine ROM by 20 degrees       Start:  03/28/25    Expected End:  05/28/25            Improve L shoulder ROM to 145 degrees of flexion, 110 degrees of abduction, 45 degrees of IR and ER at 90 degrees of abduction       Start:  03/28/25    Expected End:  05/28/25            Pt will be compliant with her Home exercise program       Start:  03/28/25    Expected End:  05/28/25            Pt will report improved function through an improved score on the FOTO Neck survey       Start:  03/28/25    Expected End:   05/28/25               Short term goals       Pt will be instructed in an exercise program to address functional deficits related to her neck Sx       Start:  03/28/25    Expected End:  04/28/25            Improve Cx spine ROM by 10 degrees       Start:  03/28/25    Expected End:  04/28/25            Improve L shoulder ROM to 110 degrees of flexion, 90 degrees of abduction, 25 degrees of IR and ER at 90 degrees of abduction       Start:  03/28/25    Expected End:  04/28/25            Pt will be compliant with her Physical Therapy appointments       Start:  03/28/25    Expected End:  04/28/25                  Cervicalgia Problems        Cervicalgia Problems (Active)       Long term goals       Pt will be independent in a HEP to assist in managing their neck and L shoulder Sx.   (Progressing)       Start:  03/28/25    Expected End:  05/28/25            Improve Cx spine ROM by 20 degrees (Progressing)       Start:  03/28/25    Expected End:  05/28/25            Improve L shoulder ROM to 145 degrees of flexion, 110 degrees of abduction and 45 degrees of IR and ER at 90 degrees of abduction (Progressing)       Start:  03/28/25    Expected End:  05/28/25            Pt will report improved function through an improved score on the FOTO Neck survey (Progressing)       Start:  03/28/25    Expected End:  05/28/25            Pt will return to work full time/full duty (Progressing)       Start:  03/28/25    Expected End:  05/28/25               Short term goals       Pt will be instructed in an exercise program to address functional deficits related to  neck Sx (Progressing)       Start:  03/28/25    Expected End:  04/28/25            Improve Cx spine ROM by 10 degrees (Progressing)       Start:  03/28/25    Expected End:  04/28/25            Improve L shoulder ROM to 110 degrees of flexion, 90 degrees of abduction and 25 degrees of IR and ER at 90 degrees of abduction (Progressing)       Start:  03/28/25    Expected End:   04/28/25            Pt will be compliant with her Physical Therapy appointments (Progressing)       Start:  03/28/25    Expected End:  04/28/25                   José Manuel Ortega, PT

## 2025-07-01 ENCOUNTER — OFFICE VISIT (OUTPATIENT)
Dept: URGENT CARE | Facility: CLINIC | Age: 55
End: 2025-07-01
Payer: COMMERCIAL

## 2025-07-01 VITALS
BODY MASS INDEX: 38.45 KG/M2 | OXYGEN SATURATION: 98 % | DIASTOLIC BLOOD PRESSURE: 89 MMHG | HEIGHT: 63 IN | RESPIRATION RATE: 18 BRPM | TEMPERATURE: 98 F | SYSTOLIC BLOOD PRESSURE: 150 MMHG | WEIGHT: 217 LBS | HEART RATE: 68 BPM

## 2025-07-01 DIAGNOSIS — G89.29 CHRONIC LEFT SHOULDER PAIN: ICD-10-CM

## 2025-07-01 DIAGNOSIS — Z02.6 ENCOUNTER RELATED TO WORKER'S COMPENSATION CLAIM: ICD-10-CM

## 2025-07-01 DIAGNOSIS — S16.1XXD CERVICAL MUSCLE STRAIN, SUBSEQUENT ENCOUNTER: ICD-10-CM

## 2025-07-01 DIAGNOSIS — M75.02 ADHESIVE CAPSULITIS OF LEFT SHOULDER: ICD-10-CM

## 2025-07-01 DIAGNOSIS — S06.0X0S CONCUSSION WITHOUT LOSS OF CONSCIOUSNESS, SEQUELA: Primary | ICD-10-CM

## 2025-07-01 DIAGNOSIS — M25.512 CHRONIC LEFT SHOULDER PAIN: ICD-10-CM

## 2025-07-01 DIAGNOSIS — M50.821 OTHER CERVICAL DISC DISORDERS AT C4-C5 LEVEL: ICD-10-CM

## 2025-07-01 DIAGNOSIS — M54.2 CERVICALGIA: ICD-10-CM
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
## 2025-07-01 DIAGNOSIS — M75.22 BICEPS TENDONITIS ON LEFT: ICD-10-CM

## 2025-07-01 DIAGNOSIS — S42.292S: ICD-10-CM

## 2025-07-01 DIAGNOSIS — S46.812S TRAUMATIC RUPTURE OF SUPRASPINATUS TENDON OF LEFT SHOULDER, SEQUELA: ICD-10-CM

## 2025-07-01 NOTE — LETTER
[facility] [address] [address]  Phone: [phone]  Fax: [phone]  [facility]: [phone]    Pt Name: Lizabeth Gomez  Injury Date: 09/19/2024   Employee ID:  Date of  Treatment: 07/01/2025   Company: [employer]      Appointment Time: 10:15 AM Arrived: 10:20 am   Provider: Moreno Zafar PA-C Time Out:10:50 am     Office Treatment:   1. Concussion without loss of consciousness, sequela    2. Encounter related to worker's compensation claim    3. Cervicalgia    4. Traumatic rupture of supraspinatus tendon of left shoulder, sequela    5. Biceps tendonitis on left    6. Fracture of humeral head, closed, left, sequela    7. Other cervical disc disorders at C4-C5 level    8. Cervical muscle strain, subsequent encounter    9. Chronic left shoulder pain    10. Adhesive capsulitis of left shoulder    11. Work related injury          Patient Instructions: Attention not to aggravate affected area, Daily home exercises/warm soaks, Continue Physical Therapy    Restrictions: Limited use of left hand and arm, No lifting/pushing/pulling more than 10 lbs, No above the shoulder/overhead work     Return Appointment: 8/5/2025 at 10:00 am     ZORAN

## 2025-07-01 NOTE — PROGRESS NOTES
Subjective:      Patient ID: Lizabeth Gomez is a 55 y.o. female.    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  She reports improvement since last office visit.  Patient states she completed speech therapy and vestibular PT. says her cognition is back to normal.  Says her neurologic tests indicate she is exceeding normal.  She continues to have intermittent headaches, better with over-the-counter NSAIDs or acetaminophen.  Says her neck pain and shoulder pain are much better.  She still has some limited range of motion of the left shoulder, however that is improving with physical therapy.  Says she has about 8 more visits with PT over the next month to address her neck and shoulder.  She was seen by ortho recently.  Patient declined any surgical intervention, therefore she was referred back here.  She was told to follow up with Orthopedics as needed.  Patient is currently working light duty.    Head Injury   Associated symptoms include headaches.   Injury  Associated symptoms include arthralgias, headaches and neck pain.     Constitution: Positive for activity change.   Neck: Positive for neck pain and neck stiffness.   Musculoskeletal:  Positive for pain, joint pain and abnormal ROM of joint.   Neurological:  Positive for headaches. Negative for dizziness and loss of balance.     Objective:     Physical Exam  Vitals and nursing note reviewed.   Constitutional:       General: She is not in acute distress.     Appearance: Normal appearance. She is well-developed. She is not ill-appearing, toxic-appearing or  diaphoretic.   HENT:      Head: Normocephalic and atraumatic.      Jaw: No trismus or pain on movement.      Right Ear: Hearing, tympanic membrane, ear canal and external ear normal.      Left Ear: Hearing, tympanic membrane, ear canal and external ear normal.      Nose: Nose normal. No nasal deformity, mucosal edema or rhinorrhea.      Right Sinus: No maxillary sinus tenderness or frontal sinus tenderness.      Left Sinus: No maxillary sinus tenderness or frontal sinus tenderness.      Mouth/Throat:      Dentition: Normal dentition.      Pharynx: Uvula midline. No posterior oropharyngeal erythema or uvula swelling.   Eyes:      General: Lids are normal. Vision grossly intact. No scleral icterus.     Extraocular Movements: Extraocular movements intact.      Conjunctiva/sclera: Conjunctivae normal.      Pupils: Pupils are equal, round, and reactive to light.      Comments: Sclera clear bilat   Neck:      Trachea: Trachea normal.   Cardiovascular:      Rate and Rhythm: Normal rate and regular rhythm.      Pulses: Normal pulses.      Heart sounds: Normal heart sounds.   Pulmonary:      Effort: Pulmonary effort is normal. No respiratory distress.      Breath sounds: Normal breath sounds.   Musculoskeletal:         General: No deformity.      Right shoulder: Normal pulse.      Left shoulder: No deformity or tenderness. Decreased range of motion (Flexion 120°, abduction 100°). Normal pulse.      Cervical back: Neck supple. Spasms and tenderness present. No swelling, deformity or rigidity. Pain with movement and muscular tenderness present. No spinous process tenderness. Normal range of motion.      Thoracic back: No tenderness.      Lumbar back: Tenderness present. Decreased range of motion. Negative right straight leg raise test and negative left straight leg raise test.        Back:       Comments: Left shoulder demonstrates reduced range of motion, improved since last office visit.     Skin:     General: Skin is warm  and dry.      Coloration: Skin is not pale.   Neurological:      General: No focal deficit present.      Mental Status: She is alert and oriented to person, place, and time. She is not disoriented.      Cranial Nerves: Cranial nerves 2-12 are intact. No cranial nerve deficit.      Sensory: Sensation is intact.      Motor: Motor function is intact. No weakness or abnormal muscle tone.      Coordination: Coordination is intact. Romberg sign negative. Coordination normal. Finger-Nose-Finger Test normal. Rapid alternating movements normal.      Gait: Gait is intact. Gait and tandem walk normal.   Psychiatric:         Attention and Perception: Attention normal.         Mood and Affect: Mood normal.         Speech: Speech normal.         Behavior: Behavior normal. Behavior is cooperative.        Assessment:      1. Concussion without loss of consciousness, sequela    2. Encounter related to worker's compensation claim    3. Cervicalgia    4. Traumatic rupture of supraspinatus tendon of left shoulder, sequela    5. Biceps tendonitis on left    6. Fracture of humeral head, closed, left, sequela    7. Other cervical disc disorders at C4-C5 level    8. Cervical muscle strain, subsequent encounter    9. Chronic left shoulder pain    10. Adhesive capsulitis of left shoulder    11. Work related injury      Plan:     Patient has made marked improvement since last office visit.  She has completed vestibular PT and speech therapy.  Says her cognition is back to normal.  Patient continues with physical therapy for the next month working on her neck and left shoulder.  Patient was seen by Orthopedic specialist who advised to continue physical therapy and referred her back to me.  Patient is not interested in surgical procedure for her shoulder.  Patient had been following with Neurology, however Dr. Mayorga has left [facility].  Concussive symptoms have improved greatly.  She gets occasional headaches which are better with  over-the-counter medication.  Says she has some photophobia with bright light.  Balance problems and dizziness have resolved.     Continue home exercises and physical therapy.  Return to clinic in 5 weeks or sooner as needed.  Anticipate MMI and discharge at next office visit.  Patient verbalized understanding and agreement.       Patient Instructions: Attention not to aggravate affected area, Daily home exercises/warm soaks, Continue Physical Therapy   Restrictions: Limited use of left hand and arm, No lifting/pushing/pulling more than 10 lbs, No above the shoulder/overhead work  Follow up in about 5 weeks (around 8/5/2025) for Reassessment.

## 2025-07-15 ENCOUNTER — CLINICAL SUPPORT (OUTPATIENT)
Dept: REHABILITATION | Facility: HOSPITAL | Age: 55
End: 2025-07-15
Payer: COMMERCIAL

## 2025-07-15 DIAGNOSIS — M54.2 NECK PAIN: Primary | ICD-10-CM

## 2025-07-15 PROCEDURE — 97140 MANUAL THERAPY 1/> REGIONS: CPT | Mod: PO

## 2025-07-15 PROCEDURE — 97110 THERAPEUTIC EXERCISES: CPT | Mod: PO

## 2025-07-15 PROCEDURE — 97112 NEUROMUSCULAR REEDUCATION: CPT | Mod: PO

## 2025-07-15 NOTE — PROGRESS NOTES
Outpatient Rehab    Physical Therapy Visit    Patient Name: Lizabeth Gomez  MRN: [id number]  YOB: 1970  Encounter Date: 7/15/2025    Therapy Diagnosis:   Encounter Diagnosis   Name Primary?    Neck pain Yes     Physician: Krzysztof Mayorga MD    Physician Orders: Eval and Treat  Medical Diagnosis: Whiplash injury to neck, subsequent encounter  Cervicalgia of wewxpbof-lpizcoi-frazy region  Cervicogenic headache  Surgical Diagnosis: Not applicable for this Episode   Surgical Date: Not applicable for this Episode  Days Since Last Surgery: Not applicable for this Episode    Visit # / Visits Authorized:  8 / 16  Insurance Authorization Period: 3/20/2025 to 3/20/2026  Date of Evaluation: 3/20/2025  3/21/2025  3/28/2025  6/13/2025  Plan of Care Certification: 6/13/2025 to 9/13/2025      PT/PTA: PT   Number of PTA visits since last PT visit:0  Time In: 1403   Time Out: 1458  Total Time (in minutes): 55   Total Billable Time (in minutes): 55    FOTO:  Intake Score: 43%  Survey Score 2: Not applicable for this Episode%  Survey Score 3: Not applicable for this Episode%    Precautions:         Subjective   Pt is a little sore today.  She reportes that everyone else has cleared her to return to full duty..  Pain reported as 2/10. L upper trap    Objective            Treatment:  Therapeutic Exercise  TE 1: Seated Cx spine rotation 5 x 5 seconds to the R  TE 2: Seated Cx spine side bending 5 x 5 seconds to the R  Manual Therapy  MT 1: Soft tissue mobilzation to the Cx spine paraspinals, upper trap and L thoracic paraspinals  MT 2: Manual Cx spine traction  MT 3: Seated L thoracic spine and rib cage passive mobilization  Balance/Neuromuscular Re-Education  NMR 6: Scapular retractions 10 x 2  NMR 7: Shoudler shrugs 10 x 2  NMR 8: Standing rows 10 x 2 with gree TB  NMR 9: Standing B shoulder extension 10 x 2 wiht green TB    Time Entry(in minutes):  Manual Therapy Time Entry: 25  Neuromuscular Re-Education Time  "Entry: 15  Therapeutic Exercise Time Entry: 15    Assessment & Plan   Assessment: Pt presents reporting that she is feeling better overall and that she continues to have "tightness" in her L upper trapezius.  She remains tender to palpation along the L transverse processes in the lower CX spine and at the L costovertebral joints of T 1 - 4.  She tolerated soft tissue and joint mobilization with reported discomfort and exercises well today.       The patient will continue to benefit from skilled outpatient physical therapy in order to address the deficits listed in the problem list on the initial evaluation, provide patient and family education, and maximize the patients level of independence in the home and community environments.     The patient's spiritual, cultural, and educational needs were considered, and the patient is agreeable to the plan of care and goals.           Plan: Progress Physical Therapy program to assist Pt with managing her Cx spine Sx and return to full duty at work.    Goals:   Outpatient Rehab - Rehab - Physical Therapy - March 2025 Problems       Outpatient Rehab - Rehab - Physical Therapy - March 2025 Problems (Active)       Long term goals       Pt will be independent in a HEP to assist in managing their Neck and shoulder Sx.         Start:  03/28/25    Expected End:  05/28/25            Improve Cx spine ROM by 20 degrees       Start:  03/28/25    Expected End:  05/28/25            Improve L shoulder ROM to 145 degrees of flexion, 110 degrees of abduction, 45 degrees of IR and ER at 90 degrees of abduction       Start:  03/28/25    Expected End:  05/28/25            Pt will be compliant with her Home exercise program       Start:  03/28/25    Expected End:  05/28/25            Pt will report improved function through an improved score on the FOTO Neck survey       Start:  03/28/25    Expected End:  05/28/25               Short term goals       Pt will be instructed in an exercise program " to address functional deficits related to her neck Sx       Start:  03/28/25    Expected End:  04/28/25            Improve Cx spine ROM by 10 degrees       Start:  03/28/25    Expected End:  04/28/25            Improve L shoulder ROM to 110 degrees of flexion, 90 degrees of abduction, 25 degrees of IR and ER at 90 degrees of abduction       Start:  03/28/25    Expected End:  04/28/25            Pt will be compliant with her Physical Therapy appointments       Start:  03/28/25    Expected End:  04/28/25                  Cervicalgia Problems        Cervicalgia Problems (Active)       Long term goals       Pt will be independent in a HEP to assist in managing their neck and L shoulder Sx.   (Progressing)       Start:  03/28/25    Expected End:  05/28/25            Improve Cx spine ROM by 20 degrees (Progressing)       Start:  03/28/25    Expected End:  05/28/25            Improve L shoulder ROM to 145 degrees of flexion, 110 degrees of abduction and 45 degrees of IR and ER at 90 degrees of abduction (Progressing)       Start:  03/28/25    Expected End:  05/28/25            Pt will report improved function through an improved score on the FOTO Neck survey (Progressing)       Start:  03/28/25    Expected End:  05/28/25            Pt will return to work full time/full duty (Progressing)       Start:  03/28/25    Expected End:  05/28/25               Short term goals       Pt will be instructed in an exercise program to address functional deficits related to  neck Sx (Progressing)       Start:  03/28/25    Expected End:  04/28/25            Improve Cx spine ROM by 10 degrees (Progressing)       Start:  03/28/25    Expected End:  04/28/25            Improve L shoulder ROM to 110 degrees of flexion, 90 degrees of abduction and 25 degrees of IR and ER at 90 degrees of abduction (Progressing)       Start:  03/28/25    Expected End:  04/28/25            Pt will be compliant with her Physical Therapy appointments (Progressing)        Start:  03/28/25    Expected End:  04/28/25                   José Manuel Ortega, PT

## 2025-08-05 ENCOUNTER — OFFICE VISIT (OUTPATIENT)
Dept: URGENT CARE | Facility: CLINIC | Age: 55
End: 2025-08-05
Payer: COMMERCIAL

## 2025-08-05 VITALS
WEIGHT: 216.06 LBS | HEIGHT: 63 IN | RESPIRATION RATE: 18 BRPM | DIASTOLIC BLOOD PRESSURE: 76 MMHG | OXYGEN SATURATION: 98 % | SYSTOLIC BLOOD PRESSURE: 116 MMHG | HEART RATE: 92 BPM | TEMPERATURE: 98 F | BODY MASS INDEX: 38.28 KG/M2

## 2025-08-05 DIAGNOSIS — S46.812S TRAUMATIC RUPTURE OF SUPRASPINATUS TENDON OF LEFT SHOULDER, SEQUELA: ICD-10-CM

## 2025-08-05 DIAGNOSIS — Z02.6 ENCOUNTER RELATED TO WORKER'S COMPENSATION CLAIM: Primary | ICD-10-CM

## 2025-08-05 DIAGNOSIS — M54.2 CERVICALGIA: ICD-10-CM

## 2025-08-05 DIAGNOSIS — M75.22 BICEPS TENDONITIS ON LEFT: ICD-10-CM

## 2025-08-05 DIAGNOSIS — S06.0X0S CONCUSSION WITHOUT LOSS OF CONSCIOUSNESS, SEQUELA: ICD-10-CM

## 2025-08-05 PROCEDURE — 99214 OFFICE O/P EST MOD 30 MIN: CPT | Mod: S$GLB,,, | Performed by: EMERGENCY MEDICINE

## 2025-08-05 NOTE — PROGRESS NOTES
Subjective:      Patient ID: Lizabeth Gomez is a 55 y.o. female.    Chief Complaint: Neck Pain (HEAD, NECK, LT SHOULDER)    Patient's place of employment - [employer]  Patient's job title -   Date of Injury - 09/2024  Body part injured - HEAD, NECK, LT SHOULDER  Current work status per last visit - [employer]  Improved, same, or worse - IMPROVED  Pain Scale right now (1-10) -  3/10    KSD    Patient is and has been doing very well subjectively and objectively and has completed vestibular PT, speech PT, shoulder and neck PT.  She has made all over appointments with Orthopedics and has been cleared by Orthopedics with nonsurgical intervention and doing very much improved with normal ranges of motion of the neck back and shoulder.  Her symptoms resolved in his looking to get back to work regular duty without any restrictions.  She rarely takes any prescription medication however does get some relief from the anti-inflammatory at times.  Her mentation is normal and exam significantly improved and at baseline.  Will be discharged to work regular duty without restrictions knowing that she may return p.r.n..    Neck Pain   Pertinent negatives include no chest pain or fever.   ros  Constitution: Negative for chills, fatigue and fever.   HENT:  Negative for ear pain, sinus pain and sore throat.    Neck: Positive for neck pain and neck stiffness.   Cardiovascular:  Negative for chest pain, palpitations and sob on exertion.   Eyes:  Negative for eye pain and vision loss.   Respiratory:  Negative for cough, shortness of breath and asthma.    Gastrointestinal:  Negative for abdominal pain, nausea, vomiting and diarrhea.   Genitourinary:  Negative for dysuria, frequency and hematuria.   Musculoskeletal:  Positive for pain. Negative for abnormal ROM of joint and back pain.   Skin:  Negative for rash and wound.   Allergic/Immunologic: Negative for seasonal allergies and asthma.   Neurological:  Negative for dizziness,  light-headedness and altered mental status.   Psychiatric/Behavioral:  Negative for altered mental status and confusion.      Objective:     Physical Exam  Vitals and nursing note reviewed.   Constitutional:       General: She is not in acute distress.     Appearance: Normal appearance. She is well-developed. She is not ill-appearing, toxic-appearing or diaphoretic.   HENT:      Head: Normocephalic and atraumatic.      Jaw: No trismus or pain on movement.      Right Ear: Hearing, tympanic membrane, ear canal and external ear normal.      Left Ear: Hearing, tympanic membrane, ear canal and external ear normal.      Nose: Nose normal. No nasal deformity, mucosal edema or rhinorrhea.      Right Sinus: No maxillary sinus tenderness or frontal sinus tenderness.      Left Sinus: No maxillary sinus tenderness or frontal sinus tenderness.      Mouth/Throat:      Dentition: Normal dentition.      Pharynx: Uvula midline. No posterior oropharyngeal erythema or uvula swelling.   Eyes:      General: Lids are normal. Vision grossly intact. No scleral icterus.     Extraocular Movements: Extraocular movements intact.      Conjunctiva/sclera: Conjunctivae normal.      Pupils: Pupils are equal, round, and reactive to light.      Comments: Sclera clear bilat   Neck:      Trachea: Trachea normal.   Cardiovascular:      Rate and Rhythm: Normal rate and regular rhythm.      Pulses: Normal pulses.      Heart sounds: Normal heart sounds.   Pulmonary:      Effort: Pulmonary effort is normal. No respiratory distress.      Breath sounds: Normal breath sounds.   Musculoskeletal:         General: No deformity.      Right shoulder: Normal pulse.      Left shoulder: No deformity or tenderness. Normal range of motion (Flexion 120°, abduction 100°). Normal pulse.      Cervical back: Neck supple. No swelling, deformity, rigidity, spasms, tenderness or bony tenderness. Muscular tenderness present. No pain with movement or spinous process tenderness.  Normal range of motion.      Thoracic back: No tenderness.      Lumbar back: No tenderness. Normal range of motion. Negative right straight leg raise test and negative left straight leg raise test.      Comments: Marked improvement in range of motion of the left shoulder.  No pain on palpation or range of motion.   Skin:     General: Skin is warm and dry.      Coloration: Skin is not pale.   Neurological:      General: No focal deficit present.      Mental Status: She is alert and oriented to person, place, and time. She is not disoriented.      Cranial Nerves: Cranial nerves 2-12 are intact. No cranial nerve deficit.      Sensory: Sensation is intact.      Motor: Motor function is intact. No weakness or abnormal muscle tone.      Coordination: Coordination is intact. Romberg sign negative. Coordination normal. Finger-Nose-Finger Test normal. Rapid alternating movements normal.      Gait: Gait is intact. Gait and tandem walk normal.   Psychiatric:         Attention and Perception: Attention normal.         Mood and Affect: Mood normal.         Speech: Speech normal.         Behavior: Behavior normal. Behavior is cooperative.            Assessment:      1. Encounter related to worker's compensation claim    2. Concussion without loss of consciousness, sequela    3. Biceps tendonitis on left    4. Cervicalgia    5. Traumatic rupture of supraspinatus tendon of left shoulder, sequela      Plan:     Patient is and has been doing very well subjectively and objectively and has completed vestibular PT, speech PT, shoulder and neck PT. She has made all over appointments with Orthopedics and has been cleared by Orthopedics with nonsurgical intervention and doing very much improved with normal ranges of motion of the neck back and shoulder. Her symptoms resolved in his looking to get back to work regular duty without any restrictions. She rarely takes any prescription medication however does get some relief from the  anti-inflammatory at times. Her mentation is normal and exam significantly improved and at baseline. Will be discharged to work regular duty without restrictions knowing that she may return p.r.n..      Patient Instructions: Attention not to aggravate affected area   Work Modifications: Return to Regular Duty Next Scheduled Shift, Discharged from Occupational Health  Follow up if symptoms worsen or fail to improve.

## 2025-08-05 NOTE — LETTER
[facility] [address] [address]  Phone: [phone]  Fax: [phone]  [facility]: [email or web address]    Pt Name: Lizabeth Gomez  Injury Date: 09/19/2024   Employee ID: [id number] Date of Treatment: 08/05/2025   Company: [employer]      Appointment Time: 09:45 AM Arrived: 10:00 AM   Provider: Frankie Moralze MD Time Out: 10:48 AM     Office Treatment:   1. Encounter related to worker's compensation claim    2. Concussion without loss of consciousness, sequela    3. Biceps tendonitis on left    4. Cervicalgia    5. Traumatic rupture of supraspinatus tendon of left shoulder, sequela          Patient Instructions: Attention not to aggravate affected area      Work Modifications: Return to Regular Duty Next Scheduled Shift, Discharged from Occupational Health     Return Appointment: Return if symptoms fail to improve/worsen.    TERE